# Patient Record
Sex: FEMALE | Race: WHITE | NOT HISPANIC OR LATINO | Employment: OTHER | URBAN - METROPOLITAN AREA
[De-identification: names, ages, dates, MRNs, and addresses within clinical notes are randomized per-mention and may not be internally consistent; named-entity substitution may affect disease eponyms.]

---

## 2021-08-02 ENCOUNTER — CLINICAL SUPPORT (OUTPATIENT)
Dept: CARDIOLOGY | Facility: HOSPITAL | Age: 76
DRG: 190 | End: 2021-08-02
Attending: INTERNAL MEDICINE
Payer: MEDICARE

## 2021-08-02 ENCOUNTER — HOSPITAL ENCOUNTER (INPATIENT)
Facility: HOSPITAL | Age: 76
LOS: 2 days | Discharge: HOME | DRG: 190 | End: 2021-08-04
Attending: EMERGENCY MEDICINE | Admitting: INTERNAL MEDICINE
Payer: MEDICARE

## 2021-08-02 VITALS — HEIGHT: 64 IN | BODY MASS INDEX: 16.93 KG/M2 | WEIGHT: 99.19 LBS

## 2021-08-02 DIAGNOSIS — J44.9 CHRONIC OBSTRUCTIVE PULMONARY DISEASE, UNSPECIFIED COPD TYPE: Primary | ICD-10-CM

## 2021-08-02 DIAGNOSIS — Z20.822 SUSPECTED COVID-19 VIRUS INFECTION: ICD-10-CM

## 2021-08-02 DIAGNOSIS — R06.02 SHORTNESS OF BREATH: ICD-10-CM

## 2021-08-02 DIAGNOSIS — I50.9 CHF (CONGESTIVE HEART FAILURE): ICD-10-CM

## 2021-08-02 PROBLEM — Z85.72 HX OF LYMPHOMA, NON-HODGKINS: Status: ACTIVE | Noted: 2021-08-02

## 2021-08-02 PROBLEM — R00.0 SINUS TACHYCARDIA: Status: ACTIVE | Noted: 2021-08-02

## 2021-08-02 LAB
ALBUMIN SERPL BCP-MCNC: 3.7 G/DL (ref 3.5–5.2)
ALLENS TEST: ABNORMAL
ALP SERPL-CCNC: 47 U/L (ref 55–135)
ALT SERPL W/O P-5'-P-CCNC: 33 U/L (ref 10–44)
ANION GAP SERPL CALC-SCNC: 13 MMOL/L (ref 8–16)
AORTIC ROOT ANNULUS: 2.6 CM
AORTIC VALVE CUSP SEPERATION: 1.43 CM
AST SERPL-CCNC: 32 U/L (ref 10–40)
AV INDEX (PROSTH): 0.52
AV MEAN GRADIENT: 2 MMHG
AV PEAK GRADIENT: 4 MMHG
AV VALVE AREA: 1.48 CM2
AV VELOCITY RATIO: 63.68
BASOPHILS # BLD AUTO: 0.05 K/UL (ref 0–0.2)
BASOPHILS NFR BLD: 0.7 % (ref 0–1.9)
BILIRUB SERPL-MCNC: 0.7 MG/DL (ref 0.1–1)
BNP SERPL-MCNC: 584 PG/ML (ref 0–99)
BSA FOR ECHO PROCEDURE: 1.43 M2
BUN SERPL-MCNC: 21 MG/DL (ref 8–23)
CALCIUM SERPL-MCNC: 9.4 MG/DL (ref 8.7–10.5)
CHLORIDE SERPL-SCNC: 102 MMOL/L (ref 95–110)
CK MB SERPL-MCNC: 2.3 NG/ML (ref 0.1–6.5)
CK SERPL-CCNC: 41 U/L (ref 20–180)
CO2 SERPL-SCNC: 26 MMOL/L (ref 23–29)
CREAT SERPL-MCNC: 1.5 MG/DL (ref 0.5–1.4)
CRP SERPL-MCNC: 0.78 MG/DL
CV ECHO LV RWT: 0.36 CM
DELSYS: ABNORMAL
DIFFERENTIAL METHOD: ABNORMAL
DOP CALC AO PEAK VEL: 0.95 M/S
DOP CALC AO VTI: 18.46 CM
DOP CALC LVOT AREA: 2.8 CM2
DOP CALC LVOT DIAMETER: 1.9 CM
DOP CALC LVOT PEAK VEL: 60.5 M/S
DOP CALC LVOT STROKE VOLUME: 27.4 CM3
DOP CALCLVOT PEAK VEL VTI: 9.67 CM
E WAVE DECELERATION TIME: 154.9 MSEC
E/A RATIO: 4
E/E' RATIO: 17.14 M/S
ECHO LV POSTERIOR WALL: 0.85 CM (ref 0.6–1.1)
EJECTION FRACTION: 55 %
EOSINOPHIL # BLD AUTO: 0.2 K/UL (ref 0–0.5)
EOSINOPHIL NFR BLD: 2.1 % (ref 0–8)
ERYTHROCYTE [DISTWIDTH] IN BLOOD BY AUTOMATED COUNT: 14.2 % (ref 11.5–14.5)
ERYTHROCYTE [SEDIMENTATION RATE] IN BLOOD BY WESTERGREN METHOD: 20 MM/HR (ref 0–20)
EST. GFR  (AFRICAN AMERICAN): 39 ML/MIN/1.73 M^2
EST. GFR  (NON AFRICAN AMERICAN): 33.8 ML/MIN/1.73 M^2
FERRITIN SERPL-MCNC: 26 NG/ML (ref 20–300)
FIO2: 28
FLOW: 2
FRACTIONAL SHORTENING: 14 % (ref 28–44)
GLUCOSE SERPL-MCNC: 148 MG/DL (ref 70–110)
GLUCOSE SERPL-MCNC: 152 MG/DL (ref 70–110)
HCO3 UR-SCNC: 25.5 MMOL/L (ref 24–28)
HCT VFR BLD AUTO: 40.6 % (ref 37–48.5)
HGB BLD-MCNC: 13 G/DL (ref 12–16)
IMM GRANULOCYTES # BLD AUTO: 0.02 K/UL (ref 0–0.04)
IMM GRANULOCYTES NFR BLD AUTO: 0.3 % (ref 0–0.5)
INTERVENTRICULAR SEPTUM: 0.85 CM (ref 0.6–1.1)
IVRT: 52.49 MSEC
LACTATE SERPL-SCNC: 1.4 MMOL/L (ref 0.5–1.9)
LDH SERPL L TO P-CCNC: 159 U/L (ref 110–260)
LEFT ATRIUM SIZE: 3.97 CM
LEFT INTERNAL DIMENSION IN SYSTOLE: 4 CM (ref 2.1–4)
LEFT VENTRICLE MASS INDEX: 90 G/M2
LEFT VENTRICULAR INTERNAL DIMENSION IN DIASTOLE: 4.67 CM (ref 3.5–6)
LEFT VENTRICULAR MASS: 130.91 G
LV LATERAL E/E' RATIO: 15 M/S
LV SEPTAL E/E' RATIO: 20 M/S
LYMPHOCYTES # BLD AUTO: 1.5 K/UL (ref 1–4.8)
LYMPHOCYTES NFR BLD: 20.4 % (ref 18–48)
MCH RBC QN AUTO: 29.1 PG (ref 27–31)
MCHC RBC AUTO-ENTMCNC: 32 G/DL (ref 32–36)
MCV RBC AUTO: 91 FL (ref 82–98)
MODE: ABNORMAL
MONOCYTES # BLD AUTO: 0.3 K/UL (ref 0.3–1)
MONOCYTES NFR BLD: 3.6 % (ref 4–15)
MV PEAK A VEL: 0.3 M/S
MV PEAK E VEL: 1.2 M/S
NEUTROPHILS # BLD AUTO: 5.2 K/UL (ref 1.8–7.7)
NEUTROPHILS NFR BLD: 72.9 % (ref 38–73)
NRBC BLD-RTO: 0 /100 WBC
PCO2 BLDA: 38.2 MMHG (ref 35–45)
PH SMN: 7.43 [PH] (ref 7.35–7.45)
PISA TR MAX VEL: 3.54 M/S
PLATELET # BLD AUTO: 281 K/UL (ref 150–450)
PMV BLD AUTO: 9.8 FL (ref 9.2–12.9)
PO2 BLDA: 102 MMHG (ref 80–100)
POC BE: 1 MMOL/L
POC SATURATED O2: 98 % (ref 95–100)
POC TCO2: 27 MMOL/L (ref 23–27)
POTASSIUM SERPL-SCNC: 3.8 MMOL/L (ref 3.5–5.1)
PROCALCITONIN SERPL IA-MCNC: <0.05 NG/ML (ref 0–0.5)
PROT SERPL-MCNC: 6.8 G/DL (ref 6–8.4)
PV PEAK VELOCITY: 67.63 CM/S
RA PRESSURE: 3 MMHG
RBC # BLD AUTO: 4.47 M/UL (ref 4–5.4)
RIGHT VENTRICULAR END-DIASTOLIC DIMENSION: 169 CM
SAMPLE: ABNORMAL
SARS-COV-2 RDRP RESP QL NAA+PROBE: NEGATIVE
SITE: ABNORMAL
SODIUM SERPL-SCNC: 141 MMOL/L (ref 136–145)
SP02: 98
TDI LATERAL: 0.08 M/S
TDI SEPTAL: 0.06 M/S
TDI: 0.07 M/S
TR MAX PG: 50 MMHG
TROPONIN I SERPL DL<=0.01 NG/ML-MCNC: 0.03 NG/ML
TV REST PULMONARY ARTERY PRESSURE: 53 MMHG
WBC # BLD AUTO: 7.16 K/UL (ref 3.9–12.7)

## 2021-08-02 PROCEDURE — 96374 THER/PROPH/DIAG INJ IV PUSH: CPT

## 2021-08-02 PROCEDURE — 82550 ASSAY OF CK (CPK): CPT | Performed by: EMERGENCY MEDICINE

## 2021-08-02 PROCEDURE — 99285 EMERGENCY DEPT VISIT HI MDM: CPT | Mod: 25

## 2021-08-02 PROCEDURE — 85651 RBC SED RATE NONAUTOMATED: CPT | Performed by: EMERGENCY MEDICINE

## 2021-08-02 PROCEDURE — 94644 CONT INHLJ TX 1ST HOUR: CPT

## 2021-08-02 PROCEDURE — 84484 ASSAY OF TROPONIN QUANT: CPT | Performed by: EMERGENCY MEDICINE

## 2021-08-02 PROCEDURE — 82553 CREATINE MB FRACTION: CPT | Performed by: EMERGENCY MEDICINE

## 2021-08-02 PROCEDURE — 63600175 PHARM REV CODE 636 W HCPCS: Performed by: INTERNAL MEDICINE

## 2021-08-02 PROCEDURE — 80053 COMPREHEN METABOLIC PANEL: CPT | Performed by: EMERGENCY MEDICINE

## 2021-08-02 PROCEDURE — 94761 N-INVAS EAR/PLS OXIMETRY MLT: CPT

## 2021-08-02 PROCEDURE — 83615 LACTATE (LD) (LDH) ENZYME: CPT | Performed by: EMERGENCY MEDICINE

## 2021-08-02 PROCEDURE — 93010 ELECTROCARDIOGRAM REPORT: CPT | Mod: ,,, | Performed by: INTERNAL MEDICINE

## 2021-08-02 PROCEDURE — 12000002 HC ACUTE/MED SURGE SEMI-PRIVATE ROOM

## 2021-08-02 PROCEDURE — 25000242 PHARM REV CODE 250 ALT 637 W/ HCPCS: Performed by: INTERNAL MEDICINE

## 2021-08-02 PROCEDURE — 93005 ELECTROCARDIOGRAM TRACING: CPT | Performed by: INTERNAL MEDICINE

## 2021-08-02 PROCEDURE — 93010 EKG 12-LEAD: ICD-10-PCS | Mod: ,,, | Performed by: INTERNAL MEDICINE

## 2021-08-02 PROCEDURE — 84145 PROCALCITONIN (PCT): CPT | Performed by: EMERGENCY MEDICINE

## 2021-08-02 PROCEDURE — 99900035 HC TECH TIME PER 15 MIN (STAT)

## 2021-08-02 PROCEDURE — 99900031 HC PATIENT EDUCATION (STAT)

## 2021-08-02 PROCEDURE — 25000003 PHARM REV CODE 250: Performed by: INTERNAL MEDICINE

## 2021-08-02 PROCEDURE — 27000221 HC OXYGEN, UP TO 24 HOURS

## 2021-08-02 PROCEDURE — 94640 AIRWAY INHALATION TREATMENT: CPT

## 2021-08-02 PROCEDURE — 36415 COLL VENOUS BLD VENIPUNCTURE: CPT | Performed by: EMERGENCY MEDICINE

## 2021-08-02 PROCEDURE — 86140 C-REACTIVE PROTEIN: CPT | Performed by: EMERGENCY MEDICINE

## 2021-08-02 PROCEDURE — 93306 ECHO (CUPID ONLY): ICD-10-PCS | Mod: 26,,, | Performed by: INTERNAL MEDICINE

## 2021-08-02 PROCEDURE — 93306 TTE W/DOPPLER COMPLETE: CPT

## 2021-08-02 PROCEDURE — 83605 ASSAY OF LACTIC ACID: CPT | Performed by: EMERGENCY MEDICINE

## 2021-08-02 PROCEDURE — 82728 ASSAY OF FERRITIN: CPT | Performed by: EMERGENCY MEDICINE

## 2021-08-02 PROCEDURE — 85025 COMPLETE CBC W/AUTO DIFF WBC: CPT | Performed by: EMERGENCY MEDICINE

## 2021-08-02 PROCEDURE — 63600175 PHARM REV CODE 636 W HCPCS: Performed by: EMERGENCY MEDICINE

## 2021-08-02 PROCEDURE — 25000242 PHARM REV CODE 250 ALT 637 W/ HCPCS: Performed by: EMERGENCY MEDICINE

## 2021-08-02 PROCEDURE — 93306 TTE W/DOPPLER COMPLETE: CPT | Mod: 26,,, | Performed by: INTERNAL MEDICINE

## 2021-08-02 PROCEDURE — 83880 ASSAY OF NATRIURETIC PEPTIDE: CPT | Performed by: EMERGENCY MEDICINE

## 2021-08-02 PROCEDURE — U0002 COVID-19 LAB TEST NON-CDC: HCPCS | Performed by: EMERGENCY MEDICINE

## 2021-08-02 RX ORDER — LORAZEPAM 1 MG/1
1 TABLET ORAL ONCE
Status: COMPLETED | OUTPATIENT
Start: 2021-08-02 | End: 2021-08-02

## 2021-08-02 RX ORDER — LEVOFLOXACIN 250 MG/1
250 TABLET ORAL DAILY
Status: DISCONTINUED | OUTPATIENT
Start: 2021-08-03 | End: 2021-08-04

## 2021-08-02 RX ORDER — PANTOPRAZOLE SODIUM 40 MG/1
40 TABLET, DELAYED RELEASE ORAL
Status: DISCONTINUED | OUTPATIENT
Start: 2021-08-03 | End: 2021-08-04 | Stop reason: HOSPADM

## 2021-08-02 RX ORDER — ENOXAPARIN SODIUM 100 MG/ML
40 INJECTION SUBCUTANEOUS
Status: DISCONTINUED | OUTPATIENT
Start: 2021-08-03 | End: 2021-08-02

## 2021-08-02 RX ORDER — POTASSIUM CHLORIDE 7.45 MG/ML
40 INJECTION INTRAVENOUS
Status: DISCONTINUED | OUTPATIENT
Start: 2021-08-02 | End: 2021-08-04 | Stop reason: HOSPADM

## 2021-08-02 RX ORDER — POTASSIUM CHLORIDE 20 MEQ/1
20 TABLET, EXTENDED RELEASE ORAL
Status: DISCONTINUED | OUTPATIENT
Start: 2021-08-02 | End: 2021-08-04 | Stop reason: HOSPADM

## 2021-08-02 RX ORDER — LEVALBUTEROL 1.25 MG/.5ML
1.25 SOLUTION, CONCENTRATE RESPIRATORY (INHALATION)
Status: DISCONTINUED | OUTPATIENT
Start: 2021-08-02 | End: 2021-08-02

## 2021-08-02 RX ORDER — MAGNESIUM SULFATE HEPTAHYDRATE 40 MG/ML
4 INJECTION, SOLUTION INTRAVENOUS
Status: DISCONTINUED | OUTPATIENT
Start: 2021-08-02 | End: 2021-08-04 | Stop reason: HOSPADM

## 2021-08-02 RX ORDER — LEVALBUTEROL 1.25 MG/.5ML
1.25 SOLUTION, CONCENTRATE RESPIRATORY (INHALATION)
Status: DISCONTINUED | OUTPATIENT
Start: 2021-08-03 | End: 2021-08-04 | Stop reason: HOSPADM

## 2021-08-02 RX ORDER — MAGNESIUM SULFATE HEPTAHYDRATE 40 MG/ML
2 INJECTION, SOLUTION INTRAVENOUS
Status: DISCONTINUED | OUTPATIENT
Start: 2021-08-02 | End: 2021-08-04 | Stop reason: HOSPADM

## 2021-08-02 RX ORDER — FUROSEMIDE 10 MG/ML
20 INJECTION INTRAMUSCULAR; INTRAVENOUS ONCE
Status: COMPLETED | OUTPATIENT
Start: 2021-08-02 | End: 2021-08-02

## 2021-08-02 RX ORDER — LEVALBUTEROL 1.25 MG/.5ML
3.75 SOLUTION, CONCENTRATE RESPIRATORY (INHALATION)
Status: COMPLETED | OUTPATIENT
Start: 2021-08-02 | End: 2021-08-02

## 2021-08-02 RX ORDER — LANOLIN ALCOHOL/MO/W.PET/CERES
800 CREAM (GRAM) TOPICAL
Status: DISCONTINUED | OUTPATIENT
Start: 2021-08-02 | End: 2021-08-04 | Stop reason: HOSPADM

## 2021-08-02 RX ORDER — FAMOTIDINE 20 MG/1
20 TABLET, FILM COATED ORAL DAILY
Status: DISCONTINUED | OUTPATIENT
Start: 2021-08-02 | End: 2021-08-02

## 2021-08-02 RX ORDER — ONDANSETRON 2 MG/ML
4 INJECTION INTRAMUSCULAR; INTRAVENOUS EVERY 6 HOURS PRN
Status: DISCONTINUED | OUTPATIENT
Start: 2021-08-02 | End: 2021-08-04 | Stop reason: HOSPADM

## 2021-08-02 RX ORDER — POTASSIUM CHLORIDE 7.45 MG/ML
20 INJECTION INTRAVENOUS
Status: DISCONTINUED | OUTPATIENT
Start: 2021-08-02 | End: 2021-08-04 | Stop reason: HOSPADM

## 2021-08-02 RX ORDER — IPRATROPIUM BROMIDE 0.5 MG/2.5ML
0.5 SOLUTION RESPIRATORY (INHALATION) EVERY 8 HOURS
Status: DISCONTINUED | OUTPATIENT
Start: 2021-08-02 | End: 2021-08-02

## 2021-08-02 RX ORDER — ENOXAPARIN SODIUM 100 MG/ML
50 INJECTION SUBCUTANEOUS ONCE
Status: COMPLETED | OUTPATIENT
Start: 2021-08-02 | End: 2021-08-02

## 2021-08-02 RX ORDER — POTASSIUM CHLORIDE 20 MEQ/1
40 TABLET, EXTENDED RELEASE ORAL
Status: DISCONTINUED | OUTPATIENT
Start: 2021-08-02 | End: 2021-08-04 | Stop reason: HOSPADM

## 2021-08-02 RX ORDER — ENOXAPARIN SODIUM 100 MG/ML
30 INJECTION SUBCUTANEOUS
Status: DISCONTINUED | OUTPATIENT
Start: 2021-08-03 | End: 2021-08-04 | Stop reason: HOSPADM

## 2021-08-02 RX ORDER — ACETAMINOPHEN 325 MG/1
650 TABLET ORAL EVERY 4 HOURS PRN
Status: DISCONTINUED | OUTPATIENT
Start: 2021-08-02 | End: 2021-08-04 | Stop reason: HOSPADM

## 2021-08-02 RX ORDER — ENOXAPARIN SODIUM 100 MG/ML
30 INJECTION SUBCUTANEOUS EVERY 24 HOURS
Status: DISCONTINUED | OUTPATIENT
Start: 2021-08-02 | End: 2021-08-02

## 2021-08-02 RX ORDER — IPRATROPIUM BROMIDE 0.5 MG/2.5ML
0.5 SOLUTION RESPIRATORY (INHALATION)
Status: DISCONTINUED | OUTPATIENT
Start: 2021-08-03 | End: 2021-08-04 | Stop reason: HOSPADM

## 2021-08-02 RX ORDER — PREDNISONE 20 MG/1
40 TABLET ORAL DAILY
Status: DISCONTINUED | OUTPATIENT
Start: 2021-08-02 | End: 2021-08-04 | Stop reason: HOSPADM

## 2021-08-02 RX ORDER — METOPROLOL TARTRATE 25 MG/1
25 TABLET, FILM COATED ORAL 2 TIMES DAILY
Status: DISCONTINUED | OUTPATIENT
Start: 2021-08-02 | End: 2021-08-02

## 2021-08-02 RX ORDER — LORAZEPAM 1 MG/1
1 TABLET ORAL EVERY 8 HOURS PRN
Status: DISCONTINUED | OUTPATIENT
Start: 2021-08-02 | End: 2021-08-04 | Stop reason: HOSPADM

## 2021-08-02 RX ORDER — METOPROLOL TARTRATE 25 MG/1
25 TABLET, FILM COATED ORAL 2 TIMES DAILY
Status: DISCONTINUED | OUTPATIENT
Start: 2021-08-02 | End: 2021-08-04 | Stop reason: HOSPADM

## 2021-08-02 RX ORDER — DEXAMETHASONE SODIUM PHOSPHATE 4 MG/ML
6 INJECTION, SOLUTION INTRA-ARTICULAR; INTRALESIONAL; INTRAMUSCULAR; INTRAVENOUS; SOFT TISSUE
Status: COMPLETED | OUTPATIENT
Start: 2021-08-02 | End: 2021-08-02

## 2021-08-02 RX ORDER — MAGNESIUM SULFATE 1 G/100ML
1 INJECTION INTRAVENOUS
Status: DISCONTINUED | OUTPATIENT
Start: 2021-08-02 | End: 2021-08-04 | Stop reason: HOSPADM

## 2021-08-02 RX ORDER — ALPRAZOLAM 0.5 MG/1
0.5 TABLET ORAL 3 TIMES DAILY PRN
Status: DISCONTINUED | OUTPATIENT
Start: 2021-08-02 | End: 2021-08-02

## 2021-08-02 RX ORDER — LEVOFLOXACIN 500 MG/1
500 TABLET, FILM COATED ORAL ONCE
Status: COMPLETED | OUTPATIENT
Start: 2021-08-02 | End: 2021-08-02

## 2021-08-02 RX ADMIN — ALPRAZOLAM 0.5 MG: 0.5 TABLET ORAL at 03:08

## 2021-08-02 RX ADMIN — FUROSEMIDE 20 MG: 10 INJECTION, SOLUTION INTRAMUSCULAR; INTRAVENOUS at 03:08

## 2021-08-02 RX ADMIN — LEVOFLOXACIN 500 MG: 500 TABLET, FILM COATED ORAL at 12:08

## 2021-08-02 RX ADMIN — FAMOTIDINE 20 MG: 20 TABLET ORAL at 03:08

## 2021-08-02 RX ADMIN — ENOXAPARIN SODIUM 50 MG: 60 INJECTION SUBCUTANEOUS at 03:08

## 2021-08-02 RX ADMIN — PREDNISONE 40 MG: 20 TABLET ORAL at 03:08

## 2021-08-02 RX ADMIN — DEXAMETHASONE SODIUM PHOSPHATE 6 MG: 4 INJECTION, SOLUTION INTRA-ARTICULAR; INTRALESIONAL; INTRAMUSCULAR; INTRAVENOUS; SOFT TISSUE at 09:08

## 2021-08-02 RX ADMIN — METOPROLOL TARTRATE 25 MG: 25 TABLET, FILM COATED ORAL at 12:08

## 2021-08-02 RX ADMIN — FUROSEMIDE 20 MG: 10 INJECTION, SOLUTION INTRAMUSCULAR; INTRAVENOUS at 06:08

## 2021-08-02 RX ADMIN — LEVALBUTEROL HYDROCHLORIDE 3.75 MG: 1.25 SOLUTION, CONCENTRATE RESPIRATORY (INHALATION) at 08:08

## 2021-08-02 RX ADMIN — LEVALBUTEROL HYDROCHLORIDE 1.25 MG: 1.25 SOLUTION, CONCENTRATE RESPIRATORY (INHALATION) at 05:08

## 2021-08-02 RX ADMIN — LORAZEPAM 1 MG: 1 TABLET ORAL at 06:08

## 2021-08-02 RX ADMIN — METOPROLOL TARTRATE 25 MG: 25 TABLET, FILM COATED ORAL at 09:08

## 2021-08-02 RX ADMIN — IPRATROPIUM BROMIDE 0.5 MG: 0.5 SOLUTION RESPIRATORY (INHALATION) at 05:08

## 2021-08-03 PROBLEM — J44.9 COPD (CHRONIC OBSTRUCTIVE PULMONARY DISEASE): Status: ACTIVE | Noted: 2021-08-03

## 2021-08-03 PROBLEM — I27.20 PULMONARY HYPERTENSION: Status: ACTIVE | Noted: 2021-08-03

## 2021-08-03 PROBLEM — J96.00 ACUTE RESPIRATORY FAILURE: Status: ACTIVE | Noted: 2021-08-03

## 2021-08-03 PROBLEM — J44.1 COPD WITH ACUTE EXACERBATION: Status: ACTIVE | Noted: 2021-08-03

## 2021-08-03 PROBLEM — N17.9 AKI (ACUTE KIDNEY INJURY): Status: ACTIVE | Noted: 2021-08-03

## 2021-08-03 PROBLEM — I51.89 DIASTOLIC DYSFUNCTION: Status: ACTIVE | Noted: 2021-08-03

## 2021-08-03 LAB
ALBUMIN SERPL BCP-MCNC: 3.8 G/DL (ref 3.5–5.2)
ALP SERPL-CCNC: 48 U/L (ref 55–135)
ALT SERPL W/O P-5'-P-CCNC: 44 U/L (ref 10–44)
ANION GAP SERPL CALC-SCNC: 13 MMOL/L (ref 8–16)
AST SERPL-CCNC: 37 U/L (ref 10–40)
BASOPHILS # BLD AUTO: 0 K/UL (ref 0–0.2)
BASOPHILS NFR BLD: 0 % (ref 0–1.9)
BILIRUB SERPL-MCNC: 1 MG/DL (ref 0.1–1)
BUN SERPL-MCNC: 35 MG/DL (ref 8–23)
CALCIUM SERPL-MCNC: 9.8 MG/DL (ref 8.7–10.5)
CHLORIDE SERPL-SCNC: 98 MMOL/L (ref 95–110)
CO2 SERPL-SCNC: 29 MMOL/L (ref 23–29)
CREAT SERPL-MCNC: 1.7 MG/DL (ref 0.5–1.4)
DIFFERENTIAL METHOD: ABNORMAL
EOSINOPHIL # BLD AUTO: 0 K/UL (ref 0–0.5)
EOSINOPHIL NFR BLD: 0 % (ref 0–8)
ERYTHROCYTE [DISTWIDTH] IN BLOOD BY AUTOMATED COUNT: 14.2 % (ref 11.5–14.5)
EST. GFR  (AFRICAN AMERICAN): 33.5 ML/MIN/1.73 M^2
EST. GFR  (NON AFRICAN AMERICAN): 29.1 ML/MIN/1.73 M^2
GLUCOSE SERPL-MCNC: 133 MG/DL (ref 70–110)
HCT VFR BLD AUTO: 41.8 % (ref 37–48.5)
HGB BLD-MCNC: 13.4 G/DL (ref 12–16)
IMM GRANULOCYTES # BLD AUTO: 0.05 K/UL (ref 0–0.04)
IMM GRANULOCYTES NFR BLD AUTO: 0.5 % (ref 0–0.5)
LYMPHOCYTES # BLD AUTO: 0.7 K/UL (ref 1–4.8)
LYMPHOCYTES NFR BLD: 7.2 % (ref 18–48)
MCH RBC QN AUTO: 29.7 PG (ref 27–31)
MCHC RBC AUTO-ENTMCNC: 32.1 G/DL (ref 32–36)
MCV RBC AUTO: 93 FL (ref 82–98)
MONOCYTES # BLD AUTO: 0.3 K/UL (ref 0.3–1)
MONOCYTES NFR BLD: 2.7 % (ref 4–15)
NEUTROPHILS # BLD AUTO: 9.2 K/UL (ref 1.8–7.7)
NEUTROPHILS NFR BLD: 89.6 % (ref 38–73)
NRBC BLD-RTO: 0 /100 WBC
PLATELET # BLD AUTO: 303 K/UL (ref 150–450)
PMV BLD AUTO: 9.7 FL (ref 9.2–12.9)
POTASSIUM SERPL-SCNC: 4.1 MMOL/L (ref 3.5–5.1)
PROT SERPL-MCNC: 7 G/DL (ref 6–8.4)
RBC # BLD AUTO: 4.51 M/UL (ref 4–5.4)
SODIUM SERPL-SCNC: 140 MMOL/L (ref 136–145)
WBC # BLD AUTO: 10.22 K/UL (ref 3.9–12.7)

## 2021-08-03 PROCEDURE — 25000003 PHARM REV CODE 250: Performed by: INTERNAL MEDICINE

## 2021-08-03 PROCEDURE — 85025 COMPLETE CBC W/AUTO DIFF WBC: CPT | Performed by: INTERNAL MEDICINE

## 2021-08-03 PROCEDURE — 94761 N-INVAS EAR/PLS OXIMETRY MLT: CPT

## 2021-08-03 PROCEDURE — 99900035 HC TECH TIME PER 15 MIN (STAT)

## 2021-08-03 PROCEDURE — 99232 SBSQ HOSP IP/OBS MODERATE 35: CPT | Mod: ,,, | Performed by: INTERNAL MEDICINE

## 2021-08-03 PROCEDURE — 80053 COMPREHEN METABOLIC PANEL: CPT | Performed by: INTERNAL MEDICINE

## 2021-08-03 PROCEDURE — 36415 COLL VENOUS BLD VENIPUNCTURE: CPT | Performed by: INTERNAL MEDICINE

## 2021-08-03 PROCEDURE — 99232 PR SUBSEQUENT HOSPITAL CARE,LEVL II: ICD-10-PCS | Mod: ,,, | Performed by: INTERNAL MEDICINE

## 2021-08-03 PROCEDURE — 12000002 HC ACUTE/MED SURGE SEMI-PRIVATE ROOM

## 2021-08-03 PROCEDURE — 94618 PULMONARY STRESS TESTING: CPT

## 2021-08-03 PROCEDURE — 63600175 PHARM REV CODE 636 W HCPCS

## 2021-08-03 PROCEDURE — 63600175 PHARM REV CODE 636 W HCPCS: Performed by: INTERNAL MEDICINE

## 2021-08-03 PROCEDURE — 25000242 PHARM REV CODE 250 ALT 637 W/ HCPCS: Performed by: INTERNAL MEDICINE

## 2021-08-03 PROCEDURE — 94640 AIRWAY INHALATION TREATMENT: CPT

## 2021-08-03 PROCEDURE — 27000221 HC OXYGEN, UP TO 24 HOURS

## 2021-08-03 PROCEDURE — 99900031 HC PATIENT EDUCATION (STAT)

## 2021-08-03 RX ORDER — LEVALBUTEROL 1.25 MG/.5ML
1.25 SOLUTION, CONCENTRATE RESPIRATORY (INHALATION) EVERY 6 HOURS PRN
Status: DISCONTINUED | OUTPATIENT
Start: 2021-08-03 | End: 2021-08-04 | Stop reason: HOSPADM

## 2021-08-03 RX ORDER — LORAZEPAM 1 MG/1
1 TABLET ORAL ONCE
Status: COMPLETED | OUTPATIENT
Start: 2021-08-03 | End: 2021-08-03

## 2021-08-03 RX ADMIN — IPRATROPIUM BROMIDE 0.5 MG: 0.5 SOLUTION RESPIRATORY (INHALATION) at 07:08

## 2021-08-03 RX ADMIN — LEVALBUTEROL HYDROCHLORIDE 1.25 MG: 1.25 SOLUTION, CONCENTRATE RESPIRATORY (INHALATION) at 09:08

## 2021-08-03 RX ADMIN — PANTOPRAZOLE SODIUM 40 MG: 40 TABLET, DELAYED RELEASE ORAL at 06:08

## 2021-08-03 RX ADMIN — PREDNISONE 40 MG: 20 TABLET ORAL at 09:08

## 2021-08-03 RX ADMIN — LEVALBUTEROL HYDROCHLORIDE 1.25 MG: 1.25 SOLUTION, CONCENTRATE RESPIRATORY (INHALATION) at 02:08

## 2021-08-03 RX ADMIN — LEVOFLOXACIN 250 MG: 250 TABLET, FILM COATED ORAL at 09:08

## 2021-08-03 RX ADMIN — ACETAMINOPHEN 650 MG: 325 TABLET, FILM COATED ORAL at 11:08

## 2021-08-03 RX ADMIN — IPRATROPIUM BROMIDE 0.5 MG: 0.5 SOLUTION RESPIRATORY (INHALATION) at 04:08

## 2021-08-03 RX ADMIN — ENOXAPARIN SODIUM 30 MG: 30 INJECTION SUBCUTANEOUS at 06:08

## 2021-08-03 RX ADMIN — LEVALBUTEROL HYDROCHLORIDE 1.25 MG: 1.25 SOLUTION, CONCENTRATE RESPIRATORY (INHALATION) at 04:08

## 2021-08-03 RX ADMIN — METOPROLOL TARTRATE 25 MG: 25 TABLET, FILM COATED ORAL at 08:08

## 2021-08-03 RX ADMIN — METOPROLOL TARTRATE 25 MG: 25 TABLET, FILM COATED ORAL at 09:08

## 2021-08-03 RX ADMIN — IPRATROPIUM BROMIDE 0.5 MG: 0.5 SOLUTION RESPIRATORY (INHALATION) at 09:08

## 2021-08-03 RX ADMIN — LORAZEPAM 1 MG: 1 TABLET ORAL at 02:08

## 2021-08-03 RX ADMIN — LEVALBUTEROL HYDROCHLORIDE 1.25 MG: 1.25 SOLUTION, CONCENTRATE RESPIRATORY (INHALATION) at 07:08

## 2021-08-04 VITALS
RESPIRATION RATE: 18 BRPM | DIASTOLIC BLOOD PRESSURE: 81 MMHG | TEMPERATURE: 98 F | WEIGHT: 99.19 LBS | BODY MASS INDEX: 16.93 KG/M2 | OXYGEN SATURATION: 95 % | HEART RATE: 80 BPM | HEIGHT: 64 IN | SYSTOLIC BLOOD PRESSURE: 105 MMHG

## 2021-08-04 PROBLEM — Z85.72 HX OF LYMPHOMA, NON-HODGKINS: Status: RESOLVED | Noted: 2021-08-02 | Resolved: 2021-08-04

## 2021-08-04 PROBLEM — R00.0 SINUS TACHYCARDIA: Status: RESOLVED | Noted: 2021-08-02 | Resolved: 2021-08-04

## 2021-08-04 PROBLEM — N17.9 AKI (ACUTE KIDNEY INJURY): Status: RESOLVED | Noted: 2021-08-03 | Resolved: 2021-08-04

## 2021-08-04 PROBLEM — J96.00 ACUTE RESPIRATORY FAILURE: Status: RESOLVED | Noted: 2021-08-03 | Resolved: 2021-08-04

## 2021-08-04 PROBLEM — J44.1 COPD WITH ACUTE EXACERBATION: Status: RESOLVED | Noted: 2021-08-03 | Resolved: 2021-08-04

## 2021-08-04 LAB
ALBUMIN SERPL BCP-MCNC: 3.4 G/DL (ref 3.5–5.2)
ALP SERPL-CCNC: 44 U/L (ref 55–135)
ALT SERPL W/O P-5'-P-CCNC: 60 U/L (ref 10–44)
ANION GAP SERPL CALC-SCNC: 10 MMOL/L (ref 8–16)
AST SERPL-CCNC: 56 U/L (ref 10–40)
BASOPHILS # BLD AUTO: 0.01 K/UL (ref 0–0.2)
BASOPHILS NFR BLD: 0.1 % (ref 0–1.9)
BILIRUB SERPL-MCNC: 0.7 MG/DL (ref 0.1–1)
BUN SERPL-MCNC: 45 MG/DL (ref 8–23)
CALCIUM SERPL-MCNC: 9.5 MG/DL (ref 8.7–10.5)
CHLORIDE SERPL-SCNC: 100 MMOL/L (ref 95–110)
CO2 SERPL-SCNC: 29 MMOL/L (ref 23–29)
CREAT SERPL-MCNC: 1.9 MG/DL (ref 0.5–1.4)
DIFFERENTIAL METHOD: ABNORMAL
EOSINOPHIL # BLD AUTO: 0 K/UL (ref 0–0.5)
EOSINOPHIL NFR BLD: 0 % (ref 0–8)
ERYTHROCYTE [DISTWIDTH] IN BLOOD BY AUTOMATED COUNT: 14.4 % (ref 11.5–14.5)
EST. GFR  (AFRICAN AMERICAN): 29.3 ML/MIN/1.73 M^2
EST. GFR  (NON AFRICAN AMERICAN): 25.4 ML/MIN/1.73 M^2
GLUCOSE SERPL-MCNC: 111 MG/DL (ref 70–110)
HCT VFR BLD AUTO: 36.6 % (ref 37–48.5)
HGB BLD-MCNC: 11.7 G/DL (ref 12–16)
IMM GRANULOCYTES # BLD AUTO: 0.05 K/UL (ref 0–0.04)
IMM GRANULOCYTES NFR BLD AUTO: 0.5 % (ref 0–0.5)
LYMPHOCYTES # BLD AUTO: 1.1 K/UL (ref 1–4.8)
LYMPHOCYTES NFR BLD: 10.1 % (ref 18–48)
MCH RBC QN AUTO: 30 PG (ref 27–31)
MCHC RBC AUTO-ENTMCNC: 32 G/DL (ref 32–36)
MCV RBC AUTO: 94 FL (ref 82–98)
MONOCYTES # BLD AUTO: 0.5 K/UL (ref 0.3–1)
MONOCYTES NFR BLD: 4.2 % (ref 4–15)
NEUTROPHILS # BLD AUTO: 9 K/UL (ref 1.8–7.7)
NEUTROPHILS NFR BLD: 85.1 % (ref 38–73)
NRBC BLD-RTO: 0 /100 WBC
PLATELET # BLD AUTO: 264 K/UL (ref 150–450)
PMV BLD AUTO: 10 FL (ref 9.2–12.9)
POTASSIUM SERPL-SCNC: 4.1 MMOL/L (ref 3.5–5.1)
PROT SERPL-MCNC: 6.1 G/DL (ref 6–8.4)
RBC # BLD AUTO: 3.9 M/UL (ref 4–5.4)
SODIUM SERPL-SCNC: 139 MMOL/L (ref 136–145)
WBC # BLD AUTO: 10.59 K/UL (ref 3.9–12.7)

## 2021-08-04 PROCEDURE — 99232 PR SUBSEQUENT HOSPITAL CARE,LEVL II: ICD-10-PCS | Mod: ,,, | Performed by: INTERNAL MEDICINE

## 2021-08-04 PROCEDURE — 94640 AIRWAY INHALATION TREATMENT: CPT

## 2021-08-04 PROCEDURE — 36415 COLL VENOUS BLD VENIPUNCTURE: CPT | Performed by: INTERNAL MEDICINE

## 2021-08-04 PROCEDURE — 63600175 PHARM REV CODE 636 W HCPCS: Performed by: INTERNAL MEDICINE

## 2021-08-04 PROCEDURE — 25000003 PHARM REV CODE 250: Performed by: INTERNAL MEDICINE

## 2021-08-04 PROCEDURE — 99900035 HC TECH TIME PER 15 MIN (STAT)

## 2021-08-04 PROCEDURE — 94761 N-INVAS EAR/PLS OXIMETRY MLT: CPT

## 2021-08-04 PROCEDURE — 85025 COMPLETE CBC W/AUTO DIFF WBC: CPT | Performed by: INTERNAL MEDICINE

## 2021-08-04 PROCEDURE — 99232 SBSQ HOSP IP/OBS MODERATE 35: CPT | Mod: ,,, | Performed by: INTERNAL MEDICINE

## 2021-08-04 PROCEDURE — 27000221 HC OXYGEN, UP TO 24 HOURS

## 2021-08-04 PROCEDURE — 25000242 PHARM REV CODE 250 ALT 637 W/ HCPCS: Performed by: INTERNAL MEDICINE

## 2021-08-04 PROCEDURE — 80053 COMPREHEN METABOLIC PANEL: CPT | Performed by: INTERNAL MEDICINE

## 2021-08-04 RX ORDER — SODIUM CHLORIDE 9 MG/ML
INJECTION, SOLUTION INTRAVENOUS CONTINUOUS
Status: DISCONTINUED | OUTPATIENT
Start: 2021-08-04 | End: 2021-08-04

## 2021-08-04 RX ORDER — FLUTICASONE FUROATE AND VILANTEROL TRIFENATATE 200; 25 UG/1; UG/1
1 POWDER RESPIRATORY (INHALATION) DAILY
Qty: 1 EACH | Refills: 0 | Status: SHIPPED | OUTPATIENT
Start: 2021-08-04 | End: 2021-08-09 | Stop reason: SDUPTHER

## 2021-08-04 RX ORDER — LORAZEPAM 1 MG/1
1 TABLET ORAL EVERY 12 HOURS PRN
Qty: 15 TABLET | Refills: 0 | Status: SHIPPED | OUTPATIENT
Start: 2021-08-04 | End: 2021-08-09 | Stop reason: SDUPTHER

## 2021-08-04 RX ORDER — PREDNISONE 20 MG/1
TABLET ORAL
Qty: 9 TABLET | Refills: 0 | Status: SHIPPED | OUTPATIENT
Start: 2021-08-05 | End: 2021-08-11

## 2021-08-04 RX ORDER — ALBUTEROL SULFATE 90 UG/1
2 AEROSOL, METERED RESPIRATORY (INHALATION) EVERY 6 HOURS PRN
Qty: 6.7 G | Refills: 0 | Status: SHIPPED | OUTPATIENT
Start: 2021-08-04 | End: 2021-09-07 | Stop reason: SDUPTHER

## 2021-08-04 RX ADMIN — PREDNISONE 40 MG: 20 TABLET ORAL at 08:08

## 2021-08-04 RX ADMIN — PANTOPRAZOLE SODIUM 40 MG: 40 TABLET, DELAYED RELEASE ORAL at 05:08

## 2021-08-04 RX ADMIN — LEVALBUTEROL HYDROCHLORIDE 1.25 MG: 1.25 SOLUTION, CONCENTRATE RESPIRATORY (INHALATION) at 11:08

## 2021-08-04 RX ADMIN — LORAZEPAM 1 MG: 1 TABLET ORAL at 10:08

## 2021-08-04 RX ADMIN — IPRATROPIUM BROMIDE 0.5 MG: 0.5 SOLUTION RESPIRATORY (INHALATION) at 11:08

## 2021-08-04 RX ADMIN — SODIUM CHLORIDE: 0.9 INJECTION, SOLUTION INTRAVENOUS at 10:08

## 2021-08-04 RX ADMIN — METOPROLOL TARTRATE 25 MG: 25 TABLET, FILM COATED ORAL at 08:08

## 2021-08-04 RX ADMIN — LEVALBUTEROL HYDROCHLORIDE 1.25 MG: 1.25 SOLUTION, CONCENTRATE RESPIRATORY (INHALATION) at 04:08

## 2021-08-04 RX ADMIN — LEVOFLOXACIN 250 MG: 250 TABLET, FILM COATED ORAL at 08:08

## 2021-08-09 ENCOUNTER — TELEPHONE (OUTPATIENT)
Dept: PULMONOLOGY | Facility: HOSPITAL | Age: 76
End: 2021-08-09

## 2021-08-09 RX ORDER — LORAZEPAM 1 MG/1
1 TABLET ORAL EVERY 12 HOURS PRN
Qty: 15 TABLET | Refills: 1 | Status: SHIPPED | OUTPATIENT
Start: 2021-08-09 | End: 2021-09-08

## 2021-08-09 RX ORDER — FLUTICASONE FUROATE AND VILANTEROL TRIFENATATE 200; 25 UG/1; UG/1
1 POWDER RESPIRATORY (INHALATION) DAILY
Qty: 1 EACH | Refills: 11 | Status: SHIPPED | OUTPATIENT
Start: 2021-08-09 | End: 2021-09-07 | Stop reason: SDUPTHER

## 2021-08-18 ENCOUNTER — OFFICE VISIT (OUTPATIENT)
Dept: FAMILY MEDICINE | Facility: CLINIC | Age: 76
End: 2021-08-18
Payer: MEDICARE

## 2021-08-18 VITALS
SYSTOLIC BLOOD PRESSURE: 110 MMHG | DIASTOLIC BLOOD PRESSURE: 70 MMHG | BODY MASS INDEX: 18.95 KG/M2 | HEIGHT: 64 IN | WEIGHT: 111 LBS | HEART RATE: 70 BPM

## 2021-08-18 DIAGNOSIS — J43.9 PULMONARY EMPHYSEMA, UNSPECIFIED EMPHYSEMA TYPE: ICD-10-CM

## 2021-08-18 DIAGNOSIS — E44.1 MILD PROTEIN-CALORIE MALNUTRITION: ICD-10-CM

## 2021-08-18 DIAGNOSIS — Z13.820 SCREENING FOR OSTEOPOROSIS: ICD-10-CM

## 2021-08-18 DIAGNOSIS — R11.0 NAUSEA: ICD-10-CM

## 2021-08-18 DIAGNOSIS — Z78.0 MENOPAUSE: Primary | ICD-10-CM

## 2021-08-18 DIAGNOSIS — F32.A ANXIETY AND DEPRESSION: ICD-10-CM

## 2021-08-18 DIAGNOSIS — Z87.891 FORMER CIGARETTE SMOKER: ICD-10-CM

## 2021-08-18 DIAGNOSIS — F41.9 ANXIETY AND DEPRESSION: ICD-10-CM

## 2021-08-18 DIAGNOSIS — I51.89 DIASTOLIC DYSFUNCTION: ICD-10-CM

## 2021-08-18 PROCEDURE — 99205 OFFICE O/P NEW HI 60 MIN: CPT | Mod: S$GLB,,, | Performed by: NURSE PRACTITIONER

## 2021-08-18 PROCEDURE — 99205 PR OFFICE/OUTPT VISIT, NEW, LEVL V, 60-74 MIN: ICD-10-PCS | Mod: S$GLB,,, | Performed by: NURSE PRACTITIONER

## 2021-08-18 RX ORDER — SERTRALINE HYDROCHLORIDE 25 MG/1
25 TABLET, FILM COATED ORAL DAILY
Qty: 30 TABLET | Refills: 4 | Status: ON HOLD | OUTPATIENT
Start: 2021-08-18 | End: 2021-10-01 | Stop reason: HOSPADM

## 2021-08-18 RX ORDER — PREDNISONE 5 MG/1
TABLET ORAL
Qty: 18 TABLET | Refills: 0 | Status: SHIPPED | OUTPATIENT
Start: 2021-08-18 | End: 2021-08-25

## 2021-08-18 RX ORDER — ONDANSETRON 4 MG/1
4 TABLET, ORALLY DISINTEGRATING ORAL EVERY 8 HOURS PRN
Qty: 30 TABLET | Refills: 1 | Status: ON HOLD | OUTPATIENT
Start: 2021-08-18 | End: 2021-09-11 | Stop reason: HOSPADM

## 2021-08-18 RX ORDER — ALPRAZOLAM 0.5 MG/1
0.5 TABLET ORAL 2 TIMES DAILY PRN
Qty: 60 TABLET | Refills: 0 | Status: SHIPPED | OUTPATIENT
Start: 2021-08-18 | End: 2021-10-22

## 2021-08-24 ENCOUNTER — HOSPITAL ENCOUNTER (OUTPATIENT)
Dept: RADIOLOGY | Facility: HOSPITAL | Age: 76
Discharge: HOME | End: 2021-08-24
Attending: NURSE PRACTITIONER
Payer: MEDICARE

## 2021-08-24 DIAGNOSIS — Z78.0 MENOPAUSE: ICD-10-CM

## 2021-08-24 DIAGNOSIS — Z13.820 SCREENING FOR OSTEOPOROSIS: ICD-10-CM

## 2021-08-24 PROCEDURE — 77080 DXA BONE DENSITY AXIAL: CPT | Mod: TC,PO

## 2021-08-25 ENCOUNTER — TELEPHONE (OUTPATIENT)
Dept: FAMILY MEDICINE | Facility: CLINIC | Age: 76
End: 2021-08-25

## 2021-08-25 ENCOUNTER — OFFICE VISIT (OUTPATIENT)
Dept: FAMILY MEDICINE | Facility: CLINIC | Age: 76
End: 2021-08-25
Payer: MEDICARE

## 2021-08-25 VITALS
DIASTOLIC BLOOD PRESSURE: 62 MMHG | WEIGHT: 104 LBS | OXYGEN SATURATION: 99 % | HEART RATE: 60 BPM | BODY MASS INDEX: 17.75 KG/M2 | SYSTOLIC BLOOD PRESSURE: 130 MMHG | HEIGHT: 64 IN

## 2021-08-25 DIAGNOSIS — R53.81 PHYSICAL DEBILITY: ICD-10-CM

## 2021-08-25 DIAGNOSIS — M81.0 AGE-RELATED OSTEOPOROSIS WITHOUT CURRENT PATHOLOGICAL FRACTURE: Primary | ICD-10-CM

## 2021-08-25 DIAGNOSIS — R60.0 LOCALIZED EDEMA: ICD-10-CM

## 2021-08-25 DIAGNOSIS — J43.9 PULMONARY EMPHYSEMA, UNSPECIFIED EMPHYSEMA TYPE: ICD-10-CM

## 2021-08-25 PROCEDURE — 99214 OFFICE O/P EST MOD 30 MIN: CPT | Mod: S$GLB,,, | Performed by: NURSE PRACTITIONER

## 2021-08-25 PROCEDURE — 99214 PR OFFICE/OUTPT VISIT, EST, LEVL IV, 30-39 MIN: ICD-10-PCS | Mod: S$GLB,,, | Performed by: NURSE PRACTITIONER

## 2021-08-25 RX ORDER — FUROSEMIDE 20 MG/1
20 TABLET ORAL DAILY
Qty: 30 TABLET | Refills: 4 | Status: ON HOLD | OUTPATIENT
Start: 2021-08-25 | End: 2021-09-11 | Stop reason: HOSPADM

## 2021-08-25 RX ORDER — ALENDRONATE SODIUM 70 MG/1
70 TABLET ORAL
Qty: 4 TABLET | Refills: 4 | Status: ON HOLD | OUTPATIENT
Start: 2021-08-25 | End: 2021-10-24 | Stop reason: SDUPTHER

## 2021-09-07 ENCOUNTER — TELEPHONE (OUTPATIENT)
Dept: PULMONOLOGY | Facility: HOSPITAL | Age: 76
End: 2021-09-07

## 2021-09-07 RX ORDER — ALBUTEROL SULFATE 90 UG/1
2 AEROSOL, METERED RESPIRATORY (INHALATION) EVERY 6 HOURS PRN
Qty: 6.7 G | Refills: 11 | Status: SHIPPED | OUTPATIENT
Start: 2021-09-07

## 2021-09-07 RX ORDER — FLUTICASONE FUROATE AND VILANTEROL TRIFENATATE 200; 25 UG/1; UG/1
1 POWDER RESPIRATORY (INHALATION) DAILY
Qty: 1 EACH | Refills: 11 | Status: SHIPPED | OUTPATIENT
Start: 2021-09-07 | End: 2021-09-27

## 2021-09-08 ENCOUNTER — HOSPITAL ENCOUNTER (INPATIENT)
Facility: HOSPITAL | Age: 76
LOS: 3 days | Discharge: HOME HEALTH | DRG: 308 | End: 2021-09-11
Attending: EMERGENCY MEDICINE | Admitting: INTERNAL MEDICINE
Payer: MEDICARE

## 2021-09-08 ENCOUNTER — OFFICE VISIT (OUTPATIENT)
Dept: PULMONOLOGY | Facility: CLINIC | Age: 76
End: 2021-09-08
Payer: MEDICARE

## 2021-09-08 ENCOUNTER — EXTERNAL HOME HEALTH (OUTPATIENT)
Dept: HOME HEALTH SERVICES | Facility: HOSPITAL | Age: 76
End: 2021-09-08

## 2021-09-08 VITALS
BODY MASS INDEX: 19.74 KG/M2 | SYSTOLIC BLOOD PRESSURE: 118 MMHG | OXYGEN SATURATION: 98 % | WEIGHT: 115 LBS | DIASTOLIC BLOOD PRESSURE: 78 MMHG | HEART RATE: 137 BPM

## 2021-09-08 DIAGNOSIS — R07.9 CHEST PAIN: ICD-10-CM

## 2021-09-08 DIAGNOSIS — I47.19 MULTIFOCAL ATRIAL TACHYCARDIA: ICD-10-CM

## 2021-09-08 DIAGNOSIS — R00.0 TACHYCARDIA: ICD-10-CM

## 2021-09-08 DIAGNOSIS — I48.91 ATRIAL FIBRILLATION: Primary | ICD-10-CM

## 2021-09-08 DIAGNOSIS — I51.89 DIASTOLIC DYSFUNCTION: ICD-10-CM

## 2021-09-08 DIAGNOSIS — I27.20 PULMONARY HYPERTENSION: ICD-10-CM

## 2021-09-08 DIAGNOSIS — R60.9 SWELLING: ICD-10-CM

## 2021-09-08 DIAGNOSIS — J44.9 CHRONIC OBSTRUCTIVE PULMONARY DISEASE, UNSPECIFIED COPD TYPE: ICD-10-CM

## 2021-09-08 DIAGNOSIS — R00.0 TACHYCARDIA, UNSPECIFIED: ICD-10-CM

## 2021-09-08 DIAGNOSIS — E87.6 HYPOKALEMIA: ICD-10-CM

## 2021-09-08 PROBLEM — I50.33 ACUTE ON CHRONIC DIASTOLIC HEART FAILURE: Status: ACTIVE | Noted: 2021-09-08

## 2021-09-08 LAB
ALBUMIN SERPL BCP-MCNC: 3.5 G/DL (ref 3.5–5.2)
ALP SERPL-CCNC: 52 U/L (ref 55–135)
ALT SERPL W/O P-5'-P-CCNC: 48 U/L (ref 10–44)
ANION GAP SERPL CALC-SCNC: 17 MMOL/L (ref 8–16)
APTT PPP: 24.2 SEC (ref 25.6–35.8)
AST SERPL-CCNC: 32 U/L (ref 10–40)
BASOPHILS # BLD AUTO: 0.01 K/UL (ref 0–0.2)
BASOPHILS NFR BLD: 0.1 % (ref 0–1.9)
BILIRUB SERPL-MCNC: 1.8 MG/DL (ref 0.1–1)
BILIRUB UR QL STRIP: NEGATIVE
BNP SERPL-MCNC: 1436 PG/ML (ref 0–99)
BUN SERPL-MCNC: 34 MG/DL (ref 8–23)
CALCIUM SERPL-MCNC: 9.5 MG/DL (ref 8.7–10.5)
CHLORIDE SERPL-SCNC: 97 MMOL/L (ref 95–110)
CK MB SERPL-MCNC: 5.4 NG/ML (ref 0.1–6.5)
CK SERPL-CCNC: 114 U/L (ref 20–180)
CLARITY UR: CLEAR
CO2 SERPL-SCNC: 30 MMOL/L (ref 23–29)
COLOR UR: YELLOW
CREAT SERPL-MCNC: 1.6 MG/DL (ref 0.5–1.4)
DIFFERENTIAL METHOD: ABNORMAL
EOSINOPHIL # BLD AUTO: 0 K/UL (ref 0–0.5)
EOSINOPHIL NFR BLD: 0.5 % (ref 0–8)
ERYTHROCYTE [DISTWIDTH] IN BLOOD BY AUTOMATED COUNT: 18 % (ref 11.5–14.5)
EST. GFR  (AFRICAN AMERICAN): 36.1 ML/MIN/1.73 M^2
EST. GFR  (NON AFRICAN AMERICAN): 31.3 ML/MIN/1.73 M^2
GLUCOSE SERPL-MCNC: 135 MG/DL (ref 70–110)
GLUCOSE UR QL STRIP: NEGATIVE
HCT VFR BLD AUTO: 37.7 % (ref 37–48.5)
HGB BLD-MCNC: 11.6 G/DL (ref 12–16)
HGB UR QL STRIP: NEGATIVE
IMM GRANULOCYTES # BLD AUTO: 0.04 K/UL (ref 0–0.04)
IMM GRANULOCYTES NFR BLD AUTO: 0.5 % (ref 0–0.5)
INR PPP: 1.1
KETONES UR QL STRIP: NEGATIVE
LEUKOCYTE ESTERASE UR QL STRIP: NEGATIVE
LYMPHOCYTES # BLD AUTO: 0.6 K/UL (ref 1–4.8)
LYMPHOCYTES NFR BLD: 7.9 % (ref 18–48)
MAGNESIUM SERPL-MCNC: 2.2 MG/DL (ref 1.6–2.6)
MCH RBC QN AUTO: 28.9 PG (ref 27–31)
MCHC RBC AUTO-ENTMCNC: 30.8 G/DL (ref 32–36)
MCV RBC AUTO: 94 FL (ref 82–98)
MONOCYTES # BLD AUTO: 0.3 K/UL (ref 0.3–1)
MONOCYTES NFR BLD: 4.1 % (ref 4–15)
NEUTROPHILS # BLD AUTO: 6.9 K/UL (ref 1.8–7.7)
NEUTROPHILS NFR BLD: 86.9 % (ref 38–73)
NITRITE UR QL STRIP: NEGATIVE
NRBC BLD-RTO: 0 /100 WBC
PH UR STRIP: 6 [PH] (ref 5–8)
PLATELET # BLD AUTO: 218 K/UL (ref 150–450)
PMV BLD AUTO: 10.2 FL (ref 9.2–12.9)
POTASSIUM SERPL-SCNC: 2.8 MMOL/L (ref 3.5–5.1)
PROT SERPL-MCNC: 6.3 G/DL (ref 6–8.4)
PROT UR QL STRIP: NEGATIVE
PROTHROMBIN TIME: 13.7 SEC (ref 11.8–14.3)
RBC # BLD AUTO: 4.01 M/UL (ref 4–5.4)
SARS-COV-2 RDRP RESP QL NAA+PROBE: NEGATIVE
SODIUM SERPL-SCNC: 144 MMOL/L (ref 136–145)
SP GR UR STRIP: 1.01 (ref 1–1.03)
TROPONIN I SERPL DL<=0.01 NG/ML-MCNC: 0.04 NG/ML
TSH SERPL DL<=0.005 MIU/L-ACNC: 3.3 UIU/ML (ref 0.34–5.6)
URN SPEC COLLECT METH UR: NORMAL
UROBILINOGEN UR STRIP-ACNC: 1 EU/DL
WBC # BLD AUTO: 7.96 K/UL (ref 3.9–12.7)

## 2021-09-08 PROCEDURE — 99291 CRITICAL CARE FIRST HOUR: CPT

## 2021-09-08 PROCEDURE — 85025 COMPLETE CBC W/AUTO DIFF WBC: CPT | Performed by: EMERGENCY MEDICINE

## 2021-09-08 PROCEDURE — 82550 ASSAY OF CK (CPK): CPT | Performed by: EMERGENCY MEDICINE

## 2021-09-08 PROCEDURE — 96375 TX/PRO/DX INJ NEW DRUG ADDON: CPT

## 2021-09-08 PROCEDURE — 93005 ELECTROCARDIOGRAM TRACING: CPT | Performed by: INTERNAL MEDICINE

## 2021-09-08 PROCEDURE — 25000003 PHARM REV CODE 250: Performed by: NURSE PRACTITIONER

## 2021-09-08 PROCEDURE — 63600175 PHARM REV CODE 636 W HCPCS: Performed by: EMERGENCY MEDICINE

## 2021-09-08 PROCEDURE — 36415 COLL VENOUS BLD VENIPUNCTURE: CPT | Performed by: NURSE PRACTITIONER

## 2021-09-08 PROCEDURE — U0002 COVID-19 LAB TEST NON-CDC: HCPCS | Performed by: EMERGENCY MEDICINE

## 2021-09-08 PROCEDURE — 81003 URINALYSIS AUTO W/O SCOPE: CPT | Performed by: EMERGENCY MEDICINE

## 2021-09-08 PROCEDURE — 93010 ELECTROCARDIOGRAM REPORT: CPT | Mod: ,,, | Performed by: INTERNAL MEDICINE

## 2021-09-08 PROCEDURE — 84484 ASSAY OF TROPONIN QUANT: CPT | Mod: 91 | Performed by: NURSE PRACTITIONER

## 2021-09-08 PROCEDURE — 25000003 PHARM REV CODE 250: Performed by: EMERGENCY MEDICINE

## 2021-09-08 PROCEDURE — 63600175 PHARM REV CODE 636 W HCPCS: Performed by: INTERNAL MEDICINE

## 2021-09-08 PROCEDURE — 21000000 HC CCU ICU ROOM CHARGE

## 2021-09-08 PROCEDURE — 85610 PROTHROMBIN TIME: CPT | Performed by: EMERGENCY MEDICINE

## 2021-09-08 PROCEDURE — 80053 COMPREHEN METABOLIC PANEL: CPT | Performed by: EMERGENCY MEDICINE

## 2021-09-08 PROCEDURE — 99214 PR OFFICE/OUTPT VISIT, EST, LEVL IV, 30-39 MIN: ICD-10-PCS | Mod: S$GLB,,, | Performed by: INTERNAL MEDICINE

## 2021-09-08 PROCEDURE — 83735 ASSAY OF MAGNESIUM: CPT | Performed by: EMERGENCY MEDICINE

## 2021-09-08 PROCEDURE — 99214 OFFICE O/P EST MOD 30 MIN: CPT | Mod: S$GLB,,, | Performed by: INTERNAL MEDICINE

## 2021-09-08 PROCEDURE — 83880 ASSAY OF NATRIURETIC PEPTIDE: CPT | Performed by: EMERGENCY MEDICINE

## 2021-09-08 PROCEDURE — 82553 CREATINE MB FRACTION: CPT | Performed by: EMERGENCY MEDICINE

## 2021-09-08 PROCEDURE — 84484 ASSAY OF TROPONIN QUANT: CPT | Performed by: EMERGENCY MEDICINE

## 2021-09-08 PROCEDURE — 85730 THROMBOPLASTIN TIME PARTIAL: CPT | Performed by: EMERGENCY MEDICINE

## 2021-09-08 PROCEDURE — 96374 THER/PROPH/DIAG INJ IV PUSH: CPT

## 2021-09-08 PROCEDURE — 63600175 PHARM REV CODE 636 W HCPCS: Performed by: NURSE PRACTITIONER

## 2021-09-08 PROCEDURE — 84443 ASSAY THYROID STIM HORMONE: CPT | Performed by: EMERGENCY MEDICINE

## 2021-09-08 PROCEDURE — 93010 EKG 12-LEAD: ICD-10-PCS | Mod: ,,, | Performed by: INTERNAL MEDICINE

## 2021-09-08 RX ORDER — POTASSIUM CHLORIDE 7.45 MG/ML
40 INJECTION INTRAVENOUS
Status: DISCONTINUED | OUTPATIENT
Start: 2021-09-08 | End: 2021-09-11 | Stop reason: HOSPADM

## 2021-09-08 RX ORDER — SODIUM CHLORIDE 0.9 % (FLUSH) 0.9 %
10 SYRINGE (ML) INJECTION
Status: DISCONTINUED | OUTPATIENT
Start: 2021-09-08 | End: 2021-09-11 | Stop reason: HOSPADM

## 2021-09-08 RX ORDER — FUROSEMIDE 10 MG/ML
20 INJECTION INTRAMUSCULAR; INTRAVENOUS
Status: DISPENSED | OUTPATIENT
Start: 2021-09-08 | End: 2021-09-10

## 2021-09-08 RX ORDER — LANOLIN ALCOHOL/MO/W.PET/CERES
800 CREAM (GRAM) TOPICAL
Status: DISCONTINUED | OUTPATIENT
Start: 2021-09-08 | End: 2021-09-11 | Stop reason: HOSPADM

## 2021-09-08 RX ORDER — POTASSIUM CHLORIDE 20 MEQ/1
40 TABLET, EXTENDED RELEASE ORAL
Status: DISCONTINUED | OUTPATIENT
Start: 2021-09-08 | End: 2021-09-11 | Stop reason: HOSPADM

## 2021-09-08 RX ORDER — MAGNESIUM SULFATE HEPTAHYDRATE 40 MG/ML
4 INJECTION, SOLUTION INTRAVENOUS
Status: DISCONTINUED | OUTPATIENT
Start: 2021-09-08 | End: 2021-09-11 | Stop reason: HOSPADM

## 2021-09-08 RX ORDER — ONDANSETRON 2 MG/ML
4 INJECTION INTRAMUSCULAR; INTRAVENOUS EVERY 8 HOURS PRN
Status: DISCONTINUED | OUTPATIENT
Start: 2021-09-08 | End: 2021-09-11 | Stop reason: HOSPADM

## 2021-09-08 RX ORDER — DILTIAZEM HYDROCHLORIDE 5 MG/ML
10 INJECTION INTRAVENOUS
Status: COMPLETED | OUTPATIENT
Start: 2021-09-08 | End: 2021-09-08

## 2021-09-08 RX ORDER — POLYETHYLENE GLYCOL 3350 17 G/17G
17 POWDER, FOR SOLUTION ORAL 2 TIMES DAILY PRN
Status: DISCONTINUED | OUTPATIENT
Start: 2021-09-08 | End: 2021-09-11 | Stop reason: HOSPADM

## 2021-09-08 RX ORDER — ACETAMINOPHEN 325 MG/1
650 TABLET ORAL EVERY 4 HOURS PRN
Status: DISCONTINUED | OUTPATIENT
Start: 2021-09-08 | End: 2021-09-11 | Stop reason: HOSPADM

## 2021-09-08 RX ORDER — FUROSEMIDE 10 MG/ML
40 INJECTION INTRAMUSCULAR; INTRAVENOUS
Status: DISCONTINUED | OUTPATIENT
Start: 2021-09-08 | End: 2021-09-08

## 2021-09-08 RX ORDER — POTASSIUM CHLORIDE 20 MEQ/1
20 TABLET, EXTENDED RELEASE ORAL
Status: DISCONTINUED | OUTPATIENT
Start: 2021-09-08 | End: 2021-09-11 | Stop reason: HOSPADM

## 2021-09-08 RX ORDER — LEVALBUTEROL 1.25 MG/.5ML
1.25 SOLUTION, CONCENTRATE RESPIRATORY (INHALATION) EVERY 6 HOURS PRN
Status: DISCONTINUED | OUTPATIENT
Start: 2021-09-08 | End: 2021-09-09

## 2021-09-08 RX ORDER — FLUTICASONE FUROATE AND VILANTEROL 200; 25 UG/1; UG/1
1 POWDER RESPIRATORY (INHALATION) DAILY
Status: DISCONTINUED | OUTPATIENT
Start: 2021-09-09 | End: 2021-09-11 | Stop reason: HOSPADM

## 2021-09-08 RX ORDER — MAGNESIUM SULFATE 1 G/100ML
1 INJECTION INTRAVENOUS
Status: DISCONTINUED | OUTPATIENT
Start: 2021-09-08 | End: 2021-09-11 | Stop reason: HOSPADM

## 2021-09-08 RX ORDER — CHOLECALCIFEROL (VITAMIN D3) 25 MCG
1000 TABLET ORAL DAILY
Status: DISCONTINUED | OUTPATIENT
Start: 2021-09-09 | End: 2021-09-11 | Stop reason: HOSPADM

## 2021-09-08 RX ORDER — SERTRALINE HYDROCHLORIDE 25 MG/1
25 TABLET, FILM COATED ORAL DAILY
Status: DISCONTINUED | OUTPATIENT
Start: 2021-09-09 | End: 2021-09-11 | Stop reason: HOSPADM

## 2021-09-08 RX ORDER — POTASSIUM CHLORIDE 7.45 MG/ML
20 INJECTION INTRAVENOUS
Status: DISCONTINUED | OUTPATIENT
Start: 2021-09-08 | End: 2021-09-11 | Stop reason: HOSPADM

## 2021-09-08 RX ORDER — TALC
6 POWDER (GRAM) TOPICAL NIGHTLY PRN
Status: DISCONTINUED | OUTPATIENT
Start: 2021-09-08 | End: 2021-09-11 | Stop reason: HOSPADM

## 2021-09-08 RX ORDER — ENOXAPARIN SODIUM 100 MG/ML
30 INJECTION SUBCUTANEOUS EVERY 24 HOURS
Status: DISCONTINUED | OUTPATIENT
Start: 2021-09-08 | End: 2021-09-10

## 2021-09-08 RX ORDER — MAGNESIUM SULFATE HEPTAHYDRATE 40 MG/ML
2 INJECTION, SOLUTION INTRAVENOUS
Status: DISCONTINUED | OUTPATIENT
Start: 2021-09-08 | End: 2021-09-11 | Stop reason: HOSPADM

## 2021-09-08 RX ORDER — AMOXICILLIN 250 MG
1 CAPSULE ORAL 2 TIMES DAILY
Status: DISCONTINUED | OUTPATIENT
Start: 2021-09-08 | End: 2021-09-11 | Stop reason: HOSPADM

## 2021-09-08 RX ORDER — POTASSIUM CHLORIDE 7.45 MG/ML
10 INJECTION INTRAVENOUS ONCE
Status: COMPLETED | OUTPATIENT
Start: 2021-09-08 | End: 2021-09-08

## 2021-09-08 RX ORDER — ALPRAZOLAM 0.5 MG/1
0.5 TABLET ORAL 2 TIMES DAILY PRN
Status: DISCONTINUED | OUTPATIENT
Start: 2021-09-08 | End: 2021-09-11 | Stop reason: HOSPADM

## 2021-09-08 RX ORDER — ENOXAPARIN SODIUM 100 MG/ML
40 INJECTION SUBCUTANEOUS EVERY 24 HOURS
Status: DISCONTINUED | OUTPATIENT
Start: 2021-09-08 | End: 2021-09-08

## 2021-09-08 RX ORDER — POTASSIUM CHLORIDE 20 MEQ/1
40 TABLET, EXTENDED RELEASE ORAL ONCE
Status: COMPLETED | OUTPATIENT
Start: 2021-09-08 | End: 2021-09-08

## 2021-09-08 RX ADMIN — DILTIAZEM HYDROCHLORIDE 10 MG: 5 INJECTION INTRAVENOUS at 02:09

## 2021-09-08 RX ADMIN — DILTIAZEM HYDROCHLORIDE 5 MG/HR: 100 INJECTION, POWDER, LYOPHILIZED, FOR SOLUTION INTRAVENOUS at 02:09

## 2021-09-08 RX ADMIN — POTASSIUM CHLORIDE 40 MEQ: 20 TABLET, EXTENDED RELEASE ORAL at 02:09

## 2021-09-08 RX ADMIN — FUROSEMIDE 20 MG: 20 INJECTION, SOLUTION INTRAMUSCULAR; INTRAVENOUS at 09:09

## 2021-09-08 RX ADMIN — ALPRAZOLAM 0.5 MG: 0.5 TABLET ORAL at 11:09

## 2021-09-08 RX ADMIN — ENOXAPARIN SODIUM 30 MG: 100 INJECTION SUBCUTANEOUS at 09:09

## 2021-09-08 RX ADMIN — MELATONIN 6 MG: at 11:09

## 2021-09-08 RX ADMIN — SENNOSIDES AND DOCUSATE SODIUM 1 TABLET: 8.6; 5 TABLET ORAL at 09:09

## 2021-09-08 RX ADMIN — POTASSIUM BICARBONATE 25 MEQ: 977.5 TABLET, EFFERVESCENT ORAL at 09:09

## 2021-09-08 RX ADMIN — POTASSIUM CHLORIDE 10 MEQ: 7.46 INJECTION, SOLUTION INTRAVENOUS at 02:09

## 2021-09-09 ENCOUNTER — CLINICAL SUPPORT (OUTPATIENT)
Dept: CARDIOLOGY | Facility: HOSPITAL | Age: 76
DRG: 308 | End: 2021-09-09
Attending: HOSPITALIST
Payer: MEDICARE

## 2021-09-09 LAB
ANION GAP SERPL CALC-SCNC: 14 MMOL/L (ref 8–16)
BASOPHILS # BLD AUTO: 0.02 K/UL (ref 0–0.2)
BASOPHILS NFR BLD: 0.3 % (ref 0–1.9)
BUN SERPL-MCNC: 27 MG/DL (ref 8–23)
CALCIUM SERPL-MCNC: 8.5 MG/DL (ref 8.7–10.5)
CHLORIDE SERPL-SCNC: 98 MMOL/L (ref 95–110)
CO2 SERPL-SCNC: 32 MMOL/L (ref 23–29)
CREAT SERPL-MCNC: 1.3 MG/DL (ref 0.5–1.4)
DIFFERENTIAL METHOD: ABNORMAL
EOSINOPHIL # BLD AUTO: 0 K/UL (ref 0–0.5)
EOSINOPHIL NFR BLD: 0.2 % (ref 0–8)
ERYTHROCYTE [DISTWIDTH] IN BLOOD BY AUTOMATED COUNT: 17.9 % (ref 11.5–14.5)
EST. GFR  (AFRICAN AMERICAN): 46.4 ML/MIN/1.73 M^2
EST. GFR  (NON AFRICAN AMERICAN): 40.2 ML/MIN/1.73 M^2
GLUCOSE SERPL-MCNC: 116 MG/DL (ref 70–110)
HCT VFR BLD AUTO: 37 % (ref 37–48.5)
HGB BLD-MCNC: 11.3 G/DL (ref 12–16)
IMM GRANULOCYTES # BLD AUTO: 0.05 K/UL (ref 0–0.04)
IMM GRANULOCYTES NFR BLD AUTO: 0.8 % (ref 0–0.5)
LYMPHOCYTES # BLD AUTO: 0.9 K/UL (ref 1–4.8)
LYMPHOCYTES NFR BLD: 14.5 % (ref 18–48)
MAGNESIUM SERPL-MCNC: 2 MG/DL (ref 1.6–2.6)
MCH RBC QN AUTO: 28.8 PG (ref 27–31)
MCHC RBC AUTO-ENTMCNC: 30.5 G/DL (ref 32–36)
MCV RBC AUTO: 94 FL (ref 82–98)
MONOCYTES # BLD AUTO: 0.3 K/UL (ref 0.3–1)
MONOCYTES NFR BLD: 4 % (ref 4–15)
NEUTROPHILS # BLD AUTO: 5.2 K/UL (ref 1.8–7.7)
NEUTROPHILS NFR BLD: 80.2 % (ref 38–73)
NRBC BLD-RTO: 0 /100 WBC
PLATELET # BLD AUTO: 200 K/UL (ref 150–450)
PMV BLD AUTO: 9.9 FL (ref 9.2–12.9)
POTASSIUM SERPL-SCNC: 3.6 MMOL/L (ref 3.5–5.1)
RBC # BLD AUTO: 3.92 M/UL (ref 4–5.4)
SODIUM SERPL-SCNC: 144 MMOL/L (ref 136–145)
TROPONIN I SERPL DL<=0.01 NG/ML-MCNC: 0.04 NG/ML
WBC # BLD AUTO: 6.48 K/UL (ref 3.9–12.7)

## 2021-09-09 PROCEDURE — 25000003 PHARM REV CODE 250: Performed by: HOSPITALIST

## 2021-09-09 PROCEDURE — 21000000 HC CCU ICU ROOM CHARGE

## 2021-09-09 PROCEDURE — 25000003 PHARM REV CODE 250: Performed by: NURSE PRACTITIONER

## 2021-09-09 PROCEDURE — 63600175 PHARM REV CODE 636 W HCPCS: Performed by: NURSE PRACTITIONER

## 2021-09-09 PROCEDURE — 80048 BASIC METABOLIC PNL TOTAL CA: CPT | Performed by: NURSE PRACTITIONER

## 2021-09-09 PROCEDURE — 84484 ASSAY OF TROPONIN QUANT: CPT | Performed by: HOSPITALIST

## 2021-09-09 PROCEDURE — 93306 ECHO (CUPID ONLY): ICD-10-PCS | Mod: 26,,, | Performed by: INTERNAL MEDICINE

## 2021-09-09 PROCEDURE — 99900031 HC PATIENT EDUCATION (STAT)

## 2021-09-09 PROCEDURE — 94640 AIRWAY INHALATION TREATMENT: CPT

## 2021-09-09 PROCEDURE — 99223 PR INITIAL HOSPITAL CARE,LEVL III: ICD-10-PCS | Mod: ,,, | Performed by: INTERNAL MEDICINE

## 2021-09-09 PROCEDURE — 27000221 HC OXYGEN, UP TO 24 HOURS

## 2021-09-09 PROCEDURE — 93306 TTE W/DOPPLER COMPLETE: CPT | Mod: 26,,, | Performed by: INTERNAL MEDICINE

## 2021-09-09 PROCEDURE — 85025 COMPLETE CBC W/AUTO DIFF WBC: CPT | Performed by: NURSE PRACTITIONER

## 2021-09-09 PROCEDURE — 36415 COLL VENOUS BLD VENIPUNCTURE: CPT | Performed by: HOSPITALIST

## 2021-09-09 PROCEDURE — 94799 UNLISTED PULMONARY SVC/PX: CPT

## 2021-09-09 PROCEDURE — 25000003 PHARM REV CODE 250: Performed by: FAMILY MEDICINE

## 2021-09-09 PROCEDURE — 99900035 HC TECH TIME PER 15 MIN (STAT)

## 2021-09-09 PROCEDURE — 36415 COLL VENOUS BLD VENIPUNCTURE: CPT | Performed by: NURSE PRACTITIONER

## 2021-09-09 PROCEDURE — 25000242 PHARM REV CODE 250 ALT 637 W/ HCPCS: Performed by: HOSPITALIST

## 2021-09-09 PROCEDURE — 63600175 PHARM REV CODE 636 W HCPCS: Performed by: INTERNAL MEDICINE

## 2021-09-09 PROCEDURE — 93306 TTE W/DOPPLER COMPLETE: CPT

## 2021-09-09 PROCEDURE — 25000242 PHARM REV CODE 250 ALT 637 W/ HCPCS: Performed by: NURSE PRACTITIONER

## 2021-09-09 PROCEDURE — 83735 ASSAY OF MAGNESIUM: CPT | Performed by: NURSE PRACTITIONER

## 2021-09-09 PROCEDURE — 25000003 PHARM REV CODE 250: Performed by: INTERNAL MEDICINE

## 2021-09-09 PROCEDURE — 94761 N-INVAS EAR/PLS OXIMETRY MLT: CPT

## 2021-09-09 PROCEDURE — 99223 1ST HOSP IP/OBS HIGH 75: CPT | Mod: ,,, | Performed by: INTERNAL MEDICINE

## 2021-09-09 RX ORDER — DIGOXIN 0.25 MG/ML
250 INJECTION INTRAMUSCULAR; INTRAVENOUS ONCE
Status: COMPLETED | OUTPATIENT
Start: 2021-09-09 | End: 2021-09-09

## 2021-09-09 RX ORDER — METOPROLOL TARTRATE 25 MG/1
25 TABLET, FILM COATED ORAL 2 TIMES DAILY
Status: DISCONTINUED | OUTPATIENT
Start: 2021-09-09 | End: 2021-09-11 | Stop reason: HOSPADM

## 2021-09-09 RX ORDER — POTASSIUM CHLORIDE 20 MEQ/1
40 TABLET, EXTENDED RELEASE ORAL ONCE
Status: COMPLETED | OUTPATIENT
Start: 2021-09-09 | End: 2021-09-09

## 2021-09-09 RX ORDER — DIGOXIN 0.25 MG/ML
250 INJECTION INTRAMUSCULAR; INTRAVENOUS ONCE
Status: DISCONTINUED | OUTPATIENT
Start: 2021-09-09 | End: 2021-09-09

## 2021-09-09 RX ORDER — METOPROLOL TARTRATE 1 MG/ML
5 INJECTION, SOLUTION INTRAVENOUS ONCE
Status: COMPLETED | OUTPATIENT
Start: 2021-09-09 | End: 2021-09-09

## 2021-09-09 RX ORDER — LEVALBUTEROL 1.25 MG/.5ML
1.25 SOLUTION, CONCENTRATE RESPIRATORY (INHALATION) EVERY 6 HOURS
Status: DISCONTINUED | OUTPATIENT
Start: 2021-09-09 | End: 2021-09-11 | Stop reason: HOSPADM

## 2021-09-09 RX ADMIN — DILTIAZEM HYDROCHLORIDE 5 MG/HR: 100 INJECTION, POWDER, LYOPHILIZED, FOR SOLUTION INTRAVENOUS at 09:09

## 2021-09-09 RX ADMIN — POTASSIUM CHLORIDE 40 MEQ: 20 TABLET, EXTENDED RELEASE ORAL at 10:09

## 2021-09-09 RX ADMIN — METOPROLOL TARTRATE 25 MG: 25 TABLET, FILM COATED ORAL at 02:09

## 2021-09-09 RX ADMIN — LEVALBUTEROL HYDROCHLORIDE 1.25 MG: 1.25 SOLUTION, CONCENTRATE RESPIRATORY (INHALATION) at 07:09

## 2021-09-09 RX ADMIN — DIGOXIN 250 MCG: 0.25 INJECTION INTRAMUSCULAR; INTRAVENOUS at 02:09

## 2021-09-09 RX ADMIN — ONDANSETRON 4 MG: 2 INJECTION INTRAMUSCULAR; INTRAVENOUS at 04:09

## 2021-09-09 RX ADMIN — ALPRAZOLAM 0.5 MG: 0.5 TABLET ORAL at 07:09

## 2021-09-09 RX ADMIN — ENOXAPARIN SODIUM 30 MG: 100 INJECTION SUBCUTANEOUS at 05:09

## 2021-09-09 RX ADMIN — ACETAMINOPHEN 650 MG: 325 TABLET, FILM COATED ORAL at 07:09

## 2021-09-09 RX ADMIN — SERTRALINE HYDROCHLORIDE 25 MG: 25 TABLET ORAL at 08:09

## 2021-09-09 RX ADMIN — Medication 1000 UNITS: at 08:09

## 2021-09-09 RX ADMIN — METOPROLOL TARTRATE 5 MG: 5 INJECTION, SOLUTION INTRAVENOUS at 12:09

## 2021-09-09 RX ADMIN — LEVALBUTEROL HYDROCHLORIDE 1.25 MG: 1.25 SOLUTION, CONCENTRATE RESPIRATORY (INHALATION) at 02:09

## 2021-09-09 RX ADMIN — FUROSEMIDE 20 MG: 20 INJECTION, SOLUTION INTRAMUSCULAR; INTRAVENOUS at 08:09

## 2021-09-09 RX ADMIN — SODIUM CHLORIDE 500 ML: 0.9 INJECTION, SOLUTION INTRAVENOUS at 11:09

## 2021-09-09 RX ADMIN — FLUTICASONE FUROATE AND VILANTEROL TRIFENATATE 1 PUFF: 200; 25 POWDER RESPIRATORY (INHALATION) at 08:09

## 2021-09-09 RX ADMIN — LEVALBUTEROL HYDROCHLORIDE 1.25 MG: 1.25 SOLUTION, CONCENTRATE RESPIRATORY (INHALATION) at 09:09

## 2021-09-09 RX ADMIN — SENNOSIDES AND DOCUSATE SODIUM 1 TABLET: 8.6; 5 TABLET ORAL at 08:09

## 2021-09-10 LAB
ANION GAP SERPL CALC-SCNC: 14 MMOL/L (ref 8–16)
AORTIC ROOT ANNULUS: 2.47 CM
AORTIC VALVE CUSP SEPERATION: 1.5 CM
AV INDEX (PROSTH): 0.64
AV MEAN GRADIENT: 3 MMHG
AV PEAK GRADIENT: 5 MMHG
AV VALVE AREA: 2.09 CM2
AV VELOCITY RATIO: 68.18
BASOPHILS # BLD AUTO: 0.02 K/UL (ref 0–0.2)
BASOPHILS NFR BLD: 0.3 % (ref 0–1.9)
BSA FOR ECHO PROCEDURE: 1.55 M2
BUN SERPL-MCNC: 41 MG/DL (ref 8–23)
CALCIUM SERPL-MCNC: 8.7 MG/DL (ref 8.7–10.5)
CHLORIDE SERPL-SCNC: 96 MMOL/L (ref 95–110)
CO2 SERPL-SCNC: 28 MMOL/L (ref 23–29)
CREAT SERPL-MCNC: 2 MG/DL (ref 0.5–1.4)
CV ECHO LV RWT: 0.45 CM
DIFFERENTIAL METHOD: ABNORMAL
DOP CALC AO PEAK VEL: 1.14 M/S
DOP CALC AO VTI: 18.33 CM
DOP CALC LVOT AREA: 3.3 CM2
DOP CALC LVOT DIAMETER: 2.04 CM
DOP CALC LVOT PEAK VEL: 77.73 M/S
DOP CALC LVOT STROKE VOLUME: 38.35 CM3
DOP CALCLVOT PEAK VEL VTI: 11.74 CM
E WAVE DECELERATION TIME: 227.91 MSEC
E/E' RATIO: 9.91 M/S
ECHO LV POSTERIOR WALL: 0.9 CM (ref 0.6–1.1)
EJECTION FRACTION: 55 %
EOSINOPHIL # BLD AUTO: 0 K/UL (ref 0–0.5)
EOSINOPHIL NFR BLD: 0.1 % (ref 0–8)
ERYTHROCYTE [DISTWIDTH] IN BLOOD BY AUTOMATED COUNT: 17.9 % (ref 11.5–14.5)
EST. GFR  (AFRICAN AMERICAN): 27.5 ML/MIN/1.73 M^2
EST. GFR  (NON AFRICAN AMERICAN): 23.9 ML/MIN/1.73 M^2
FRACTIONAL SHORTENING: 30 % (ref 28–44)
GLUCOSE SERPL-MCNC: 177 MG/DL (ref 70–110)
HCT VFR BLD AUTO: 41.1 % (ref 37–48.5)
HGB BLD-MCNC: 12.3 G/DL (ref 12–16)
IMM GRANULOCYTES # BLD AUTO: 0.12 K/UL (ref 0–0.04)
IMM GRANULOCYTES NFR BLD AUTO: 1.6 % (ref 0–0.5)
INTERVENTRICULAR SEPTUM: 0.9 CM (ref 0.6–1.1)
LEFT ATRIUM SIZE: 2.83 CM
LEFT INTERNAL DIMENSION IN SYSTOLE: 2.83 CM (ref 2.1–4)
LEFT VENTRICLE MASS INDEX: 71 G/M2
LEFT VENTRICULAR INTERNAL DIMENSION IN DIASTOLE: 4.03 CM (ref 3.5–6)
LEFT VENTRICULAR MASS: 111.01 G
LV LATERAL E/E' RATIO: 9.91 M/S
LV SEPTAL E/E' RATIO: 9.91 M/S
LYMPHOCYTES # BLD AUTO: 1.2 K/UL (ref 1–4.8)
LYMPHOCYTES NFR BLD: 15.3 % (ref 18–48)
MAGNESIUM SERPL-MCNC: 2.1 MG/DL (ref 1.6–2.6)
MCH RBC QN AUTO: 29.9 PG (ref 27–31)
MCHC RBC AUTO-ENTMCNC: 29.9 G/DL (ref 32–36)
MCV RBC AUTO: 97 FL (ref 82–98)
MONOCYTES # BLD AUTO: 0.2 K/UL (ref 0.3–1)
MONOCYTES NFR BLD: 2.7 % (ref 4–15)
MV PEAK E VEL: 1.09 M/S
NEUTROPHILS # BLD AUTO: 6.2 K/UL (ref 1.8–7.7)
NEUTROPHILS NFR BLD: 80 % (ref 38–73)
NRBC BLD-RTO: 0 /100 WBC
PISA TR MAX VEL: 3.1 M/S
PLATELET # BLD AUTO: 227 K/UL (ref 150–450)
PMV BLD AUTO: 10 FL (ref 9.2–12.9)
POTASSIUM SERPL-SCNC: 4.6 MMOL/L (ref 3.5–5.1)
PV PEAK VELOCITY: 78.22 CM/S
RA PRESSURE: 8 MMHG
RBC # BLD AUTO: 4.12 M/UL (ref 4–5.4)
SODIUM SERPL-SCNC: 138 MMOL/L (ref 136–145)
TDI LATERAL: 0.11 M/S
TDI SEPTAL: 0.11 M/S
TDI: 0.11 M/S
TR MAX PG: 38 MMHG
TV REST PULMONARY ARTERY PRESSURE: 46 MMHG
WBC # BLD AUTO: 7.72 K/UL (ref 3.9–12.7)

## 2021-09-10 PROCEDURE — 25000003 PHARM REV CODE 250: Performed by: INTERNAL MEDICINE

## 2021-09-10 PROCEDURE — 80048 BASIC METABOLIC PNL TOTAL CA: CPT | Performed by: NURSE PRACTITIONER

## 2021-09-10 PROCEDURE — 99900035 HC TECH TIME PER 15 MIN (STAT)

## 2021-09-10 PROCEDURE — 25000003 PHARM REV CODE 250: Performed by: NURSE PRACTITIONER

## 2021-09-10 PROCEDURE — 83735 ASSAY OF MAGNESIUM: CPT | Performed by: NURSE PRACTITIONER

## 2021-09-10 PROCEDURE — 27000221 HC OXYGEN, UP TO 24 HOURS

## 2021-09-10 PROCEDURE — 85025 COMPLETE CBC W/AUTO DIFF WBC: CPT | Performed by: NURSE PRACTITIONER

## 2021-09-10 PROCEDURE — 99232 PR SUBSEQUENT HOSPITAL CARE,LEVL II: ICD-10-PCS | Mod: ,,, | Performed by: INTERNAL MEDICINE

## 2021-09-10 PROCEDURE — 25000242 PHARM REV CODE 250 ALT 637 W/ HCPCS: Performed by: HOSPITALIST

## 2021-09-10 PROCEDURE — 36415 COLL VENOUS BLD VENIPUNCTURE: CPT | Performed by: NURSE PRACTITIONER

## 2021-09-10 PROCEDURE — 99900031 HC PATIENT EDUCATION (STAT)

## 2021-09-10 PROCEDURE — 99232 SBSQ HOSP IP/OBS MODERATE 35: CPT | Mod: ,,, | Performed by: INTERNAL MEDICINE

## 2021-09-10 PROCEDURE — 94761 N-INVAS EAR/PLS OXIMETRY MLT: CPT

## 2021-09-10 PROCEDURE — 21000000 HC CCU ICU ROOM CHARGE

## 2021-09-10 PROCEDURE — 25000003 PHARM REV CODE 250: Performed by: HOSPITALIST

## 2021-09-10 PROCEDURE — 94640 AIRWAY INHALATION TREATMENT: CPT

## 2021-09-10 RX ORDER — SODIUM CHLORIDE 9 MG/ML
INJECTION, SOLUTION INTRAVENOUS CONTINUOUS
Status: DISCONTINUED | OUTPATIENT
Start: 2021-09-10 | End: 2021-09-11 | Stop reason: HOSPADM

## 2021-09-10 RX ADMIN — SENNOSIDES AND DOCUSATE SODIUM 1 TABLET: 8.6; 5 TABLET ORAL at 08:09

## 2021-09-10 RX ADMIN — APIXABAN 2.5 MG: 2.5 TABLET, FILM COATED ORAL at 09:09

## 2021-09-10 RX ADMIN — LEVALBUTEROL HYDROCHLORIDE 1.25 MG: 1.25 SOLUTION, CONCENTRATE RESPIRATORY (INHALATION) at 07:09

## 2021-09-10 RX ADMIN — FLUTICASONE FUROATE AND VILANTEROL TRIFENATATE 1 PUFF: 200; 25 POWDER RESPIRATORY (INHALATION) at 07:09

## 2021-09-10 RX ADMIN — ALPRAZOLAM 0.5 MG: 0.5 TABLET ORAL at 08:09

## 2021-09-10 RX ADMIN — SERTRALINE HYDROCHLORIDE 25 MG: 25 TABLET ORAL at 08:09

## 2021-09-10 RX ADMIN — LEVALBUTEROL HYDROCHLORIDE 1.25 MG: 1.25 SOLUTION, CONCENTRATE RESPIRATORY (INHALATION) at 04:09

## 2021-09-10 RX ADMIN — METOPROLOL TARTRATE 25 MG: 25 TABLET, FILM COATED ORAL at 08:09

## 2021-09-10 RX ADMIN — Medication 1000 UNITS: at 08:09

## 2021-09-10 RX ADMIN — MELATONIN 6 MG: at 08:09

## 2021-09-10 RX ADMIN — SODIUM CHLORIDE: 0.9 INJECTION, SOLUTION INTRAVENOUS at 08:09

## 2021-09-10 RX ADMIN — APIXABAN 2.5 MG: 2.5 TABLET, FILM COATED ORAL at 08:09

## 2021-09-10 RX ADMIN — SODIUM CHLORIDE: 0.9 INJECTION, SOLUTION INTRAVENOUS at 09:09

## 2021-09-10 RX ADMIN — LEVALBUTEROL HYDROCHLORIDE 1.25 MG: 1.25 SOLUTION, CONCENTRATE RESPIRATORY (INHALATION) at 08:09

## 2021-09-11 VITALS
DIASTOLIC BLOOD PRESSURE: 59 MMHG | HEART RATE: 64 BPM | TEMPERATURE: 98 F | OXYGEN SATURATION: 94 % | RESPIRATION RATE: 15 BRPM | SYSTOLIC BLOOD PRESSURE: 94 MMHG | HEIGHT: 64 IN | WEIGHT: 116.88 LBS | BODY MASS INDEX: 19.96 KG/M2

## 2021-09-11 LAB
ANION GAP SERPL CALC-SCNC: 12 MMOL/L (ref 8–16)
BASOPHILS # BLD AUTO: 0.01 K/UL (ref 0–0.2)
BASOPHILS NFR BLD: 0.1 % (ref 0–1.9)
BUN SERPL-MCNC: 47 MG/DL (ref 8–23)
CALCIUM SERPL-MCNC: 8.5 MG/DL (ref 8.7–10.5)
CHLORIDE SERPL-SCNC: 92 MMOL/L (ref 95–110)
CO2 SERPL-SCNC: 30 MMOL/L (ref 23–29)
CREAT SERPL-MCNC: 1.9 MG/DL (ref 0.5–1.4)
DIFFERENTIAL METHOD: ABNORMAL
EOSINOPHIL # BLD AUTO: 0.1 K/UL (ref 0–0.5)
EOSINOPHIL NFR BLD: 0.9 % (ref 0–8)
ERYTHROCYTE [DISTWIDTH] IN BLOOD BY AUTOMATED COUNT: 17.3 % (ref 11.5–14.5)
EST. GFR  (AFRICAN AMERICAN): 29.3 ML/MIN/1.73 M^2
EST. GFR  (NON AFRICAN AMERICAN): 25.4 ML/MIN/1.73 M^2
GLUCOSE SERPL-MCNC: 131 MG/DL (ref 70–110)
HCT VFR BLD AUTO: 38.7 % (ref 37–48.5)
HGB BLD-MCNC: 11.7 G/DL (ref 12–16)
IMM GRANULOCYTES # BLD AUTO: 0.06 K/UL (ref 0–0.04)
IMM GRANULOCYTES NFR BLD AUTO: 0.9 % (ref 0–0.5)
LYMPHOCYTES # BLD AUTO: 0.5 K/UL (ref 1–4.8)
LYMPHOCYTES NFR BLD: 7.5 % (ref 18–48)
MCH RBC QN AUTO: 29.5 PG (ref 27–31)
MCHC RBC AUTO-ENTMCNC: 30.2 G/DL (ref 32–36)
MCV RBC AUTO: 98 FL (ref 82–98)
MONOCYTES # BLD AUTO: 0.3 K/UL (ref 0.3–1)
MONOCYTES NFR BLD: 3.7 % (ref 4–15)
NEUTROPHILS # BLD AUTO: 6 K/UL (ref 1.8–7.7)
NEUTROPHILS NFR BLD: 86.9 % (ref 38–73)
NRBC BLD-RTO: 0 /100 WBC
PLATELET # BLD AUTO: 238 K/UL (ref 150–450)
PMV BLD AUTO: 10.1 FL (ref 9.2–12.9)
POTASSIUM SERPL-SCNC: 4 MMOL/L (ref 3.5–5.1)
RBC # BLD AUTO: 3.97 M/UL (ref 4–5.4)
SODIUM SERPL-SCNC: 134 MMOL/L (ref 136–145)
WBC # BLD AUTO: 6.84 K/UL (ref 3.9–12.7)

## 2021-09-11 PROCEDURE — 80048 BASIC METABOLIC PNL TOTAL CA: CPT | Performed by: NURSE PRACTITIONER

## 2021-09-11 PROCEDURE — 27000221 HC OXYGEN, UP TO 24 HOURS

## 2021-09-11 PROCEDURE — 25000003 PHARM REV CODE 250: Performed by: INTERNAL MEDICINE

## 2021-09-11 PROCEDURE — 25000003 PHARM REV CODE 250: Performed by: NURSE PRACTITIONER

## 2021-09-11 PROCEDURE — 99900035 HC TECH TIME PER 15 MIN (STAT)

## 2021-09-11 PROCEDURE — 36415 COLL VENOUS BLD VENIPUNCTURE: CPT | Performed by: NURSE PRACTITIONER

## 2021-09-11 PROCEDURE — 94640 AIRWAY INHALATION TREATMENT: CPT

## 2021-09-11 PROCEDURE — 99900031 HC PATIENT EDUCATION (STAT)

## 2021-09-11 PROCEDURE — 25000242 PHARM REV CODE 250 ALT 637 W/ HCPCS: Performed by: HOSPITALIST

## 2021-09-11 PROCEDURE — 25000003 PHARM REV CODE 250: Performed by: HOSPITALIST

## 2021-09-11 PROCEDURE — 94761 N-INVAS EAR/PLS OXIMETRY MLT: CPT

## 2021-09-11 PROCEDURE — 85025 COMPLETE CBC W/AUTO DIFF WBC: CPT | Performed by: NURSE PRACTITIONER

## 2021-09-11 RX ORDER — METOPROLOL TARTRATE 25 MG/1
25 TABLET, FILM COATED ORAL 2 TIMES DAILY
Qty: 60 TABLET | Refills: 0
Start: 2021-09-11 | End: 2021-09-21 | Stop reason: SDUPTHER

## 2021-09-11 RX ADMIN — LEVALBUTEROL HYDROCHLORIDE 1.25 MG: 1.25 SOLUTION, CONCENTRATE RESPIRATORY (INHALATION) at 02:09

## 2021-09-11 RX ADMIN — APIXABAN 2.5 MG: 2.5 TABLET, FILM COATED ORAL at 08:09

## 2021-09-11 RX ADMIN — ACETAMINOPHEN 650 MG: 325 TABLET, FILM COATED ORAL at 02:09

## 2021-09-11 RX ADMIN — LEVALBUTEROL HYDROCHLORIDE 1.25 MG: 1.25 SOLUTION, CONCENTRATE RESPIRATORY (INHALATION) at 01:09

## 2021-09-11 RX ADMIN — ACETAMINOPHEN 650 MG: 325 TABLET, FILM COATED ORAL at 09:09

## 2021-09-11 RX ADMIN — FLUTICASONE FUROATE AND VILANTEROL TRIFENATATE 1 PUFF: 200; 25 POWDER RESPIRATORY (INHALATION) at 07:09

## 2021-09-11 RX ADMIN — Medication 1000 UNITS: at 08:09

## 2021-09-11 RX ADMIN — LEVALBUTEROL HYDROCHLORIDE 1.25 MG: 1.25 SOLUTION, CONCENTRATE RESPIRATORY (INHALATION) at 07:09

## 2021-09-11 RX ADMIN — METOPROLOL TARTRATE 25 MG: 25 TABLET, FILM COATED ORAL at 08:09

## 2021-09-11 RX ADMIN — SERTRALINE HYDROCHLORIDE 25 MG: 25 TABLET ORAL at 08:09

## 2021-09-13 ENCOUNTER — TELEPHONE (OUTPATIENT)
Dept: CARDIOLOGY | Facility: CLINIC | Age: 76
End: 2021-09-13

## 2021-09-21 ENCOUNTER — OFFICE VISIT (OUTPATIENT)
Dept: CARDIOLOGY | Facility: CLINIC | Age: 76
End: 2021-09-21
Payer: MEDICARE

## 2021-09-21 VITALS
HEART RATE: 84 BPM | SYSTOLIC BLOOD PRESSURE: 122 MMHG | HEIGHT: 64 IN | BODY MASS INDEX: 19.46 KG/M2 | OXYGEN SATURATION: 99 % | DIASTOLIC BLOOD PRESSURE: 70 MMHG | WEIGHT: 114 LBS

## 2021-09-21 DIAGNOSIS — J44.9 CHRONIC OBSTRUCTIVE PULMONARY DISEASE, UNSPECIFIED COPD TYPE: Primary | ICD-10-CM

## 2021-09-21 DIAGNOSIS — I27.20 PULMONARY HYPERTENSION: ICD-10-CM

## 2021-09-21 DIAGNOSIS — I47.19 MULTIFOCAL ATRIAL TACHYCARDIA: ICD-10-CM

## 2021-09-21 DIAGNOSIS — C85.90 LYMPHOMA, UNSPECIFIED BODY REGION, UNSPECIFIED LYMPHOMA TYPE: ICD-10-CM

## 2021-09-21 DIAGNOSIS — I87.2 VENOUS INSUFFICIENCY (CHRONIC) (PERIPHERAL): ICD-10-CM

## 2021-09-21 DIAGNOSIS — I50.33 ACUTE ON CHRONIC DIASTOLIC HEART FAILURE: ICD-10-CM

## 2021-09-21 DIAGNOSIS — E87.6 HYPOKALEMIA: ICD-10-CM

## 2021-09-21 DIAGNOSIS — R00.0 TACHYCARDIA, UNSPECIFIED: ICD-10-CM

## 2021-09-21 DIAGNOSIS — N18.1 CHRONIC KIDNEY DISEASE (CKD) STAGE G1/A1, GLOMERULAR FILTRATION RATE (GFR) EQUAL TO OR GREATER THAN 90 ML/MIN/1.73 SQUARE METER AND ALBUMINURIA CREATININE RATIO LESS THAN 30 MG/G: ICD-10-CM

## 2021-09-21 DIAGNOSIS — I51.89 DIASTOLIC DYSFUNCTION: ICD-10-CM

## 2021-09-21 PROCEDURE — 99214 PR OFFICE/OUTPT VISIT, EST, LEVL IV, 30-39 MIN: ICD-10-PCS | Mod: S$GLB,,, | Performed by: INTERNAL MEDICINE

## 2021-09-21 PROCEDURE — 99214 OFFICE O/P EST MOD 30 MIN: CPT | Mod: S$GLB,,, | Performed by: INTERNAL MEDICINE

## 2021-09-21 RX ORDER — METOPROLOL TARTRATE 25 MG/1
25 TABLET, FILM COATED ORAL 2 TIMES DAILY
Qty: 180 TABLET | Refills: 3
Start: 2021-09-21 | End: 2021-09-22

## 2021-09-21 RX ORDER — METOPROLOL TARTRATE 25 MG/1
25 TABLET, FILM COATED ORAL 2 TIMES DAILY
Qty: 180 TABLET | Refills: 3
Start: 2021-09-21 | End: 2021-09-21 | Stop reason: SDUPTHER

## 2021-09-21 RX ORDER — METOPROLOL TARTRATE 25 MG/1
25 TABLET, FILM COATED ORAL 2 TIMES DAILY
Qty: 180 TABLET | Refills: 1
Start: 2021-09-21 | End: 2021-09-21 | Stop reason: SDUPTHER

## 2021-09-21 NOTE — TELEPHONE ENCOUNTER
----- Message from Anam Pearson sent at 9/21/2021  2:46 PM CDT -----  Type:  RX Refill Request    Who Called: pt  Refill or New Rx: ref  RX Name and Strength: metoprolol tartrate (LOPRESSOR) 25 MG tablet  How is the patient currently taking it? (ex. 1XDay):  Is this a 30 day or 90 day RX:  [facility]  Local or Mail Order: local   Ordering Provider:  Would the patient rather a call back or a response via [facility]?   Best Call Back Number:  Additional Information: med still not available for pt

## 2021-09-22 ENCOUNTER — TELEPHONE (OUTPATIENT)
Dept: CARDIOLOGY | Facility: CLINIC | Age: 76
End: 2021-09-22

## 2021-09-22 ENCOUNTER — OFFICE VISIT (OUTPATIENT)
Dept: FAMILY MEDICINE | Facility: CLINIC | Age: 76
End: 2021-09-22
Payer: MEDICARE

## 2021-09-22 VITALS
BODY MASS INDEX: 21 KG/M2 | SYSTOLIC BLOOD PRESSURE: 110 MMHG | DIASTOLIC BLOOD PRESSURE: 60 MMHG | WEIGHT: 123 LBS | HEART RATE: 88 BPM | HEIGHT: 64 IN

## 2021-09-22 DIAGNOSIS — I50.33 ACUTE ON CHRONIC DIASTOLIC HEART FAILURE: ICD-10-CM

## 2021-09-22 DIAGNOSIS — R60.0 LOCALIZED EDEMA: ICD-10-CM

## 2021-09-22 DIAGNOSIS — J43.9 PULMONARY EMPHYSEMA, UNSPECIFIED EMPHYSEMA TYPE: ICD-10-CM

## 2021-09-22 DIAGNOSIS — I47.19 MULTIFOCAL ATRIAL TACHYCARDIA: Primary | ICD-10-CM

## 2021-09-22 DIAGNOSIS — N18.1 CHRONIC KIDNEY DISEASE (CKD) STAGE G1/A1, GLOMERULAR FILTRATION RATE (GFR) EQUAL TO OR GREATER THAN 90 ML/MIN/1.73 SQUARE METER AND ALBUMINURIA CREATININE RATIO LESS THAN 30 MG/G: ICD-10-CM

## 2021-09-22 DIAGNOSIS — N17.9 AKI (ACUTE KIDNEY INJURY): ICD-10-CM

## 2021-09-22 DIAGNOSIS — R53.81 PHYSICAL DEBILITY: ICD-10-CM

## 2021-09-22 PROCEDURE — 99214 PR OFFICE/OUTPT VISIT, EST, LEVL IV, 30-39 MIN: ICD-10-PCS | Mod: S$GLB,,, | Performed by: NURSE PRACTITIONER

## 2021-09-22 PROCEDURE — 99214 OFFICE O/P EST MOD 30 MIN: CPT | Mod: S$GLB,,, | Performed by: NURSE PRACTITIONER

## 2021-09-22 RX ORDER — METOPROLOL TARTRATE 25 MG/1
25 TABLET, FILM COATED ORAL 2 TIMES DAILY
Qty: 180 TABLET | Refills: 1 | Status: ON HOLD | OUTPATIENT
Start: 2021-09-22 | End: 2022-01-05 | Stop reason: HOSPADM

## 2021-09-23 ENCOUNTER — DOCUMENT SCAN (OUTPATIENT)
Dept: HOME HEALTH SERVICES | Facility: HOSPITAL | Age: 76
End: 2021-09-23

## 2021-09-23 ENCOUNTER — LAB VISIT (OUTPATIENT)
Dept: LAB | Facility: HOSPITAL | Age: 76
End: 2021-09-23
Attending: NURSE PRACTITIONER
Payer: MEDICARE

## 2021-09-23 DIAGNOSIS — N18.1 CHRONIC KIDNEY DISEASE, STAGE I: Primary | ICD-10-CM

## 2021-09-23 LAB
ALBUMIN SERPL BCP-MCNC: 2.8 G/DL (ref 3.5–5.2)
ALP SERPL-CCNC: 59 U/L (ref 55–135)
ALT SERPL W/O P-5'-P-CCNC: 28 U/L (ref 10–44)
ANION GAP SERPL CALC-SCNC: 16 MMOL/L (ref 8–16)
AST SERPL-CCNC: 22 U/L (ref 10–40)
BASOPHILS # BLD AUTO: 0.02 K/UL (ref 0–0.2)
BASOPHILS NFR BLD: 0.2 % (ref 0–1.9)
BILIRUB SERPL-MCNC: 1.3 MG/DL (ref 0.1–1)
BUN SERPL-MCNC: 38 MG/DL (ref 8–23)
CALCIUM SERPL-MCNC: 8.9 MG/DL (ref 8.7–10.5)
CHLORIDE SERPL-SCNC: 101 MMOL/L (ref 95–110)
CO2 SERPL-SCNC: 21 MMOL/L (ref 23–29)
CREAT SERPL-MCNC: 1.4 MG/DL (ref 0.5–1.4)
DIFFERENTIAL METHOD: ABNORMAL
EOSINOPHIL # BLD AUTO: 0 K/UL (ref 0–0.5)
EOSINOPHIL NFR BLD: 0.3 % (ref 0–8)
ERYTHROCYTE [DISTWIDTH] IN BLOOD BY AUTOMATED COUNT: 19.5 % (ref 11.5–14.5)
EST. GFR  (AFRICAN AMERICAN): 42.4 ML/MIN/1.73 M^2
EST. GFR  (NON AFRICAN AMERICAN): 36.8 ML/MIN/1.73 M^2
GLUCOSE SERPL-MCNC: 140 MG/DL (ref 70–110)
HCT VFR BLD AUTO: 30.6 % (ref 37–48.5)
HGB BLD-MCNC: 9.5 G/DL (ref 12–16)
IMM GRANULOCYTES # BLD AUTO: 0.12 K/UL (ref 0–0.04)
IMM GRANULOCYTES NFR BLD AUTO: 1 % (ref 0–0.5)
LYMPHOCYTES # BLD AUTO: 1.1 K/UL (ref 1–4.8)
LYMPHOCYTES NFR BLD: 9.4 % (ref 18–48)
MAGNESIUM SERPL-MCNC: 2 MG/DL (ref 1.6–2.6)
MCH RBC QN AUTO: 29.2 PG (ref 27–31)
MCHC RBC AUTO-ENTMCNC: 31 G/DL (ref 32–36)
MCV RBC AUTO: 94 FL (ref 82–98)
MONOCYTES # BLD AUTO: 0.4 K/UL (ref 0.3–1)
MONOCYTES NFR BLD: 3 % (ref 4–15)
NEUTROPHILS # BLD AUTO: 10.2 K/UL (ref 1.8–7.7)
NEUTROPHILS NFR BLD: 86.1 % (ref 38–73)
NRBC BLD-RTO: 0 /100 WBC
PLATELET # BLD AUTO: 274 K/UL (ref 150–450)
PMV BLD AUTO: 10.4 FL (ref 9.2–12.9)
POTASSIUM SERPL-SCNC: 3.3 MMOL/L (ref 3.5–5.1)
PROT SERPL-MCNC: 5.5 G/DL (ref 6–8.4)
RBC # BLD AUTO: 3.25 M/UL (ref 4–5.4)
SODIUM SERPL-SCNC: 138 MMOL/L (ref 136–145)
WBC # BLD AUTO: 11.82 K/UL (ref 3.9–12.7)

## 2021-09-23 PROCEDURE — 83735 ASSAY OF MAGNESIUM: CPT | Performed by: NURSE PRACTITIONER

## 2021-09-23 PROCEDURE — 80053 COMPREHEN METABOLIC PANEL: CPT | Performed by: NURSE PRACTITIONER

## 2021-09-23 PROCEDURE — 85025 COMPLETE CBC W/AUTO DIFF WBC: CPT | Performed by: NURSE PRACTITIONER

## 2021-09-24 ENCOUNTER — DOCUMENT SCAN (OUTPATIENT)
Dept: HOME HEALTH SERVICES | Facility: HOSPITAL | Age: 76
End: 2021-09-24

## 2021-09-27 ENCOUNTER — OFFICE VISIT (OUTPATIENT)
Dept: PULMONOLOGY | Facility: CLINIC | Age: 76
End: 2021-09-27
Payer: MEDICARE

## 2021-09-27 VITALS
DIASTOLIC BLOOD PRESSURE: 62 MMHG | BODY MASS INDEX: 20.43 KG/M2 | OXYGEN SATURATION: 98 % | HEART RATE: 65 BPM | SYSTOLIC BLOOD PRESSURE: 108 MMHG | WEIGHT: 119 LBS

## 2021-09-27 DIAGNOSIS — J44.9 CHRONIC OBSTRUCTIVE PULMONARY DISEASE, UNSPECIFIED COPD TYPE: ICD-10-CM

## 2021-09-27 DIAGNOSIS — I27.20 PULMONARY HYPERTENSION: ICD-10-CM

## 2021-09-27 PROCEDURE — 99214 OFFICE O/P EST MOD 30 MIN: CPT | Mod: S$GLB,,, | Performed by: INTERNAL MEDICINE

## 2021-09-27 PROCEDURE — 99214 PR OFFICE/OUTPT VISIT, EST, LEVL IV, 30-39 MIN: ICD-10-PCS | Mod: S$GLB,,, | Performed by: INTERNAL MEDICINE

## 2021-09-28 ENCOUNTER — HOSPITAL ENCOUNTER (INPATIENT)
Facility: HOSPITAL | Age: 76
LOS: 2 days | Discharge: HOME HEALTH | DRG: 291 | End: 2021-10-01
Attending: EMERGENCY MEDICINE | Admitting: HOSPITALIST
Payer: MEDICARE

## 2021-09-28 DIAGNOSIS — R07.9 CHEST PAIN: ICD-10-CM

## 2021-09-28 DIAGNOSIS — I49.9 ARRHYTHMIA: ICD-10-CM

## 2021-09-28 DIAGNOSIS — R06.00 DYSPNEA, UNSPECIFIED TYPE: Primary | ICD-10-CM

## 2021-09-28 DIAGNOSIS — I50.9 CONGESTIVE HEART FAILURE, UNSPECIFIED HF CHRONICITY, UNSPECIFIED HEART FAILURE TYPE: ICD-10-CM

## 2021-09-28 DIAGNOSIS — E87.6 HYPOKALEMIA: ICD-10-CM

## 2021-09-28 DIAGNOSIS — R06.02 SHORTNESS OF BREATH: ICD-10-CM

## 2021-09-28 DIAGNOSIS — I50.32 CHRONIC DIASTOLIC HEART FAILURE: Chronic | ICD-10-CM

## 2021-09-28 DIAGNOSIS — N17.9 AKI (ACUTE KIDNEY INJURY): ICD-10-CM

## 2021-09-28 PROBLEM — N18.31 STAGE 3A CHRONIC KIDNEY DISEASE: Status: ACTIVE | Noted: 2021-09-28

## 2021-09-28 PROBLEM — R63.8 SALT CRAVING: Status: ACTIVE | Noted: 2021-09-28

## 2021-09-28 LAB
ALBUMIN SERPL BCP-MCNC: 2.8 G/DL (ref 3.5–5.2)
ALBUMIN SERPL BCP-MCNC: 3 G/DL (ref 3.5–5.2)
ALP SERPL-CCNC: 57 U/L (ref 55–135)
ALT SERPL W/O P-5'-P-CCNC: 30 U/L (ref 10–44)
ANION GAP SERPL CALC-SCNC: 11 MMOL/L (ref 8–16)
ANION GAP SERPL CALC-SCNC: 13 MMOL/L (ref 8–16)
AST SERPL-CCNC: 20 U/L (ref 10–40)
BACTERIA #/AREA URNS HPF: NEGATIVE /HPF
BASOPHILS # BLD AUTO: 0.01 K/UL (ref 0–0.2)
BASOPHILS NFR BLD: 0.1 % (ref 0–1.9)
BILIRUB SERPL-MCNC: 1.8 MG/DL (ref 0.1–1)
BILIRUB UR QL STRIP: NEGATIVE
BNP SERPL-MCNC: 2662 PG/ML (ref 0–99)
BUN SERPL-MCNC: 53 MG/DL (ref 8–23)
BUN SERPL-MCNC: 58 MG/DL (ref 8–23)
CALCIUM SERPL-MCNC: 8.4 MG/DL (ref 8.7–10.5)
CALCIUM SERPL-MCNC: 8.5 MG/DL (ref 8.7–10.5)
CHLORIDE SERPL-SCNC: 102 MMOL/L (ref 95–110)
CHLORIDE SERPL-SCNC: 105 MMOL/L (ref 95–110)
CLARITY UR: CLEAR
CO2 SERPL-SCNC: 27 MMOL/L (ref 23–29)
CO2 SERPL-SCNC: 29 MMOL/L (ref 23–29)
COLOR UR: COLORLESS
CREAT SERPL-MCNC: 1.8 MG/DL (ref 0.5–1.4)
CREAT SERPL-MCNC: 1.8 MG/DL (ref 0.5–1.4)
DIFFERENTIAL METHOD: ABNORMAL
EOSINOPHIL # BLD AUTO: 0 K/UL (ref 0–0.5)
EOSINOPHIL NFR BLD: 0.2 % (ref 0–8)
ERYTHROCYTE [DISTWIDTH] IN BLOOD BY AUTOMATED COUNT: 20.3 % (ref 11.5–14.5)
EST. GFR  (AFRICAN AMERICAN): 31.3 ML/MIN/1.73 M^2
EST. GFR  (AFRICAN AMERICAN): 31.3 ML/MIN/1.73 M^2
EST. GFR  (NON AFRICAN AMERICAN): 27.1 ML/MIN/1.73 M^2
EST. GFR  (NON AFRICAN AMERICAN): 27.1 ML/MIN/1.73 M^2
GLUCOSE SERPL-MCNC: 116 MG/DL (ref 70–110)
GLUCOSE SERPL-MCNC: 98 MG/DL (ref 70–110)
GLUCOSE UR QL STRIP: NEGATIVE
HCT VFR BLD AUTO: 33.6 % (ref 37–48.5)
HGB BLD-MCNC: 10.4 G/DL (ref 12–16)
HGB UR QL STRIP: ABNORMAL
HYALINE CASTS #/AREA URNS LPF: 1 /LPF
IMM GRANULOCYTES # BLD AUTO: 0.07 K/UL (ref 0–0.04)
IMM GRANULOCYTES NFR BLD AUTO: 0.6 % (ref 0–0.5)
KETONES UR QL STRIP: NEGATIVE
LEUKOCYTE ESTERASE UR QL STRIP: ABNORMAL
LYMPHOCYTES # BLD AUTO: 0.8 K/UL (ref 1–4.8)
LYMPHOCYTES NFR BLD: 6.4 % (ref 18–48)
MCH RBC QN AUTO: 30.1 PG (ref 27–31)
MCHC RBC AUTO-ENTMCNC: 31 G/DL (ref 32–36)
MCV RBC AUTO: 97 FL (ref 82–98)
MICROSCOPIC COMMENT: NORMAL
MONOCYTES # BLD AUTO: 0.3 K/UL (ref 0.3–1)
MONOCYTES NFR BLD: 2.1 % (ref 4–15)
NEUTROPHILS # BLD AUTO: 10.8 K/UL (ref 1.8–7.7)
NEUTROPHILS NFR BLD: 90.6 % (ref 38–73)
NITRITE UR QL STRIP: NEGATIVE
NRBC BLD-RTO: 0 /100 WBC
PH UR STRIP: 6 [PH] (ref 5–8)
PHOSPHATE SERPL-MCNC: 3.8 MG/DL (ref 2.7–4.5)
PLATELET # BLD AUTO: 283 K/UL (ref 150–450)
PMV BLD AUTO: 9.6 FL (ref 9.2–12.9)
POTASSIUM SERPL-SCNC: 2.8 MMOL/L (ref 3.5–5.1)
POTASSIUM SERPL-SCNC: 3.4 MMOL/L (ref 3.5–5.1)
PROT SERPL-MCNC: 5.8 G/DL (ref 6–8.4)
PROT UR QL STRIP: NEGATIVE
RBC # BLD AUTO: 3.46 M/UL (ref 4–5.4)
RBC #/AREA URNS HPF: 1 /HPF (ref 0–4)
SARS-COV-2 RDRP RESP QL NAA+PROBE: NORMAL
SARS-COV-2 RDRP RESP QL NAA+PROBE: NORMAL
SODIUM SERPL-SCNC: 143 MMOL/L (ref 136–145)
SODIUM SERPL-SCNC: 144 MMOL/L (ref 136–145)
SP GR UR STRIP: 1 (ref 1–1.03)
SQUAMOUS #/AREA URNS HPF: 1 /HPF
TROPONIN I SERPL DL<=0.01 NG/ML-MCNC: <0.03 NG/ML
URN SPEC COLLECT METH UR: ABNORMAL
UROBILINOGEN UR STRIP-ACNC: NEGATIVE EU/DL
WBC # BLD AUTO: 11.92 K/UL (ref 3.9–12.7)
WBC #/AREA URNS HPF: 3 /HPF (ref 0–5)

## 2021-09-28 PROCEDURE — 99900031 HC PATIENT EDUCATION (STAT)

## 2021-09-28 PROCEDURE — 93005 ELECTROCARDIOGRAM TRACING: CPT | Performed by: SPECIALIST

## 2021-09-28 PROCEDURE — G0378 HOSPITAL OBSERVATION PER HR: HCPCS

## 2021-09-28 PROCEDURE — 80069 RENAL FUNCTION PANEL: CPT | Performed by: HOSPITALIST

## 2021-09-28 PROCEDURE — 84484 ASSAY OF TROPONIN QUANT: CPT | Performed by: EMERGENCY MEDICINE

## 2021-09-28 PROCEDURE — 25000003 PHARM REV CODE 250: Performed by: EMERGENCY MEDICINE

## 2021-09-28 PROCEDURE — U0003 INFECTIOUS AGENT DETECTION BY NUCLEIC ACID (DNA OR RNA); SEVERE ACUTE RESPIRATORY SYNDROME CORONAVIRUS 2 (SARS-COV-2) (CORONAVIRUS DISEASE [COVID-19]), AMPLIFIED PROBE TECHNIQUE, MAKING USE OF HIGH THROUGHPUT TECHNOLOGIES AS DESCRIBED BY CMS-2020-01-R: HCPCS | Performed by: HOSPITALIST

## 2021-09-28 PROCEDURE — 36415 COLL VENOUS BLD VENIPUNCTURE: CPT | Performed by: HOSPITALIST

## 2021-09-28 PROCEDURE — U0002 COVID-19 LAB TEST NON-CDC: HCPCS | Mod: 91 | Performed by: HOSPITALIST

## 2021-09-28 PROCEDURE — 81001 URINALYSIS AUTO W/SCOPE: CPT | Performed by: HOSPITALIST

## 2021-09-28 PROCEDURE — 99285 EMERGENCY DEPT VISIT HI MDM: CPT | Mod: 25

## 2021-09-28 PROCEDURE — 99900035 HC TECH TIME PER 15 MIN (STAT)

## 2021-09-28 PROCEDURE — 93010 EKG 12-LEAD: ICD-10-PCS | Mod: ,,, | Performed by: SPECIALIST

## 2021-09-28 PROCEDURE — 85025 COMPLETE CBC W/AUTO DIFF WBC: CPT | Performed by: EMERGENCY MEDICINE

## 2021-09-28 PROCEDURE — U0005 INFEC AGEN DETEC AMPLI PROBE: HCPCS | Performed by: HOSPITALIST

## 2021-09-28 PROCEDURE — 63600175 PHARM REV CODE 636 W HCPCS: Performed by: HOSPITALIST

## 2021-09-28 PROCEDURE — U0002 COVID-19 LAB TEST NON-CDC: HCPCS | Performed by: EMERGENCY MEDICINE

## 2021-09-28 PROCEDURE — 83880 ASSAY OF NATRIURETIC PEPTIDE: CPT | Performed by: EMERGENCY MEDICINE

## 2021-09-28 PROCEDURE — 96374 THER/PROPH/DIAG INJ IV PUSH: CPT

## 2021-09-28 PROCEDURE — 96376 TX/PRO/DX INJ SAME DRUG ADON: CPT

## 2021-09-28 PROCEDURE — 63600175 PHARM REV CODE 636 W HCPCS: Performed by: EMERGENCY MEDICINE

## 2021-09-28 PROCEDURE — 80053 COMPREHEN METABOLIC PANEL: CPT | Performed by: EMERGENCY MEDICINE

## 2021-09-28 PROCEDURE — 25000003 PHARM REV CODE 250: Performed by: HOSPITALIST

## 2021-09-28 PROCEDURE — 94761 N-INVAS EAR/PLS OXIMETRY MLT: CPT

## 2021-09-28 PROCEDURE — 93010 ELECTROCARDIOGRAM REPORT: CPT | Mod: ,,, | Performed by: SPECIALIST

## 2021-09-28 RX ORDER — POTASSIUM CHLORIDE 20 MEQ/1
60 TABLET, EXTENDED RELEASE ORAL ONCE
Status: COMPLETED | OUTPATIENT
Start: 2021-09-28 | End: 2021-09-28

## 2021-09-28 RX ORDER — POTASSIUM CHLORIDE 7.45 MG/ML
40 INJECTION INTRAVENOUS
Status: DISCONTINUED | OUTPATIENT
Start: 2021-09-28 | End: 2021-10-01 | Stop reason: HOSPADM

## 2021-09-28 RX ORDER — METOPROLOL TARTRATE 25 MG/1
25 TABLET, FILM COATED ORAL 2 TIMES DAILY
Status: DISCONTINUED | OUTPATIENT
Start: 2021-09-28 | End: 2021-10-01 | Stop reason: HOSPADM

## 2021-09-28 RX ORDER — LANOLIN ALCOHOL/MO/W.PET/CERES
1000 CREAM (GRAM) TOPICAL DAILY
Status: DISCONTINUED | OUTPATIENT
Start: 2021-09-28 | End: 2021-10-01 | Stop reason: HOSPADM

## 2021-09-28 RX ORDER — ONDANSETRON 2 MG/ML
4 INJECTION INTRAMUSCULAR; INTRAVENOUS EVERY 8 HOURS PRN
Status: DISCONTINUED | OUTPATIENT
Start: 2021-09-28 | End: 2021-10-01 | Stop reason: HOSPADM

## 2021-09-28 RX ORDER — MAGNESIUM SULFATE HEPTAHYDRATE 40 MG/ML
2 INJECTION, SOLUTION INTRAVENOUS
Status: DISCONTINUED | OUTPATIENT
Start: 2021-09-28 | End: 2021-10-01 | Stop reason: HOSPADM

## 2021-09-28 RX ORDER — POTASSIUM CHLORIDE 20 MEQ/1
40 TABLET, EXTENDED RELEASE ORAL ONCE
Status: COMPLETED | OUTPATIENT
Start: 2021-09-28 | End: 2021-09-28

## 2021-09-28 RX ORDER — POTASSIUM CHLORIDE 20 MEQ/1
40 TABLET, EXTENDED RELEASE ORAL
Status: DISCONTINUED | OUTPATIENT
Start: 2021-09-28 | End: 2021-10-01 | Stop reason: HOSPADM

## 2021-09-28 RX ORDER — SODIUM CHLORIDE 0.9 % (FLUSH) 0.9 %
10 SYRINGE (ML) INJECTION EVERY 12 HOURS PRN
Status: DISCONTINUED | OUTPATIENT
Start: 2021-09-28 | End: 2021-10-01 | Stop reason: HOSPADM

## 2021-09-28 RX ORDER — POTASSIUM CHLORIDE 20 MEQ/1
20 TABLET, EXTENDED RELEASE ORAL
Status: DISCONTINUED | OUTPATIENT
Start: 2021-09-28 | End: 2021-10-01 | Stop reason: HOSPADM

## 2021-09-28 RX ORDER — SIMETHICONE 80 MG
1 TABLET,CHEWABLE ORAL 4 TIMES DAILY PRN
Status: DISCONTINUED | OUTPATIENT
Start: 2021-09-28 | End: 2021-10-01 | Stop reason: HOSPADM

## 2021-09-28 RX ORDER — LANOLIN ALCOHOL/MO/W.PET/CERES
800 CREAM (GRAM) TOPICAL
Status: DISCONTINUED | OUTPATIENT
Start: 2021-09-28 | End: 2021-10-01 | Stop reason: HOSPADM

## 2021-09-28 RX ORDER — ACETAMINOPHEN 325 MG/1
650 TABLET ORAL EVERY 4 HOURS PRN
Status: DISCONTINUED | OUTPATIENT
Start: 2021-09-28 | End: 2021-10-01 | Stop reason: HOSPADM

## 2021-09-28 RX ORDER — FUROSEMIDE 10 MG/ML
40 INJECTION INTRAMUSCULAR; INTRAVENOUS
Status: COMPLETED | OUTPATIENT
Start: 2021-09-28 | End: 2021-09-28

## 2021-09-28 RX ORDER — TALC
6 POWDER (GRAM) TOPICAL NIGHTLY PRN
Status: DISCONTINUED | OUTPATIENT
Start: 2021-09-28 | End: 2021-10-01 | Stop reason: HOSPADM

## 2021-09-28 RX ORDER — MAGNESIUM SULFATE 1 G/100ML
1 INJECTION INTRAVENOUS
Status: DISCONTINUED | OUTPATIENT
Start: 2021-09-28 | End: 2021-10-01 | Stop reason: HOSPADM

## 2021-09-28 RX ORDER — FUROSEMIDE 10 MG/ML
40 INJECTION INTRAMUSCULAR; INTRAVENOUS ONCE
Status: COMPLETED | OUTPATIENT
Start: 2021-09-28 | End: 2021-09-28

## 2021-09-28 RX ORDER — POTASSIUM CHLORIDE 7.45 MG/ML
20 INJECTION INTRAVENOUS
Status: DISCONTINUED | OUTPATIENT
Start: 2021-09-28 | End: 2021-10-01 | Stop reason: HOSPADM

## 2021-09-28 RX ORDER — MAGNESIUM SULFATE HEPTAHYDRATE 40 MG/ML
4 INJECTION, SOLUTION INTRAVENOUS
Status: DISCONTINUED | OUTPATIENT
Start: 2021-09-28 | End: 2021-10-01 | Stop reason: HOSPADM

## 2021-09-28 RX ORDER — IPRATROPIUM BROMIDE AND ALBUTEROL SULFATE 2.5; .5 MG/3ML; MG/3ML
3 SOLUTION RESPIRATORY (INHALATION) EVERY 6 HOURS PRN
Status: DISCONTINUED | OUTPATIENT
Start: 2021-09-28 | End: 2021-10-01 | Stop reason: HOSPADM

## 2021-09-28 RX ORDER — SERTRALINE HYDROCHLORIDE 25 MG/1
25 TABLET, FILM COATED ORAL DAILY
Status: DISCONTINUED | OUTPATIENT
Start: 2021-09-28 | End: 2021-10-01

## 2021-09-28 RX ORDER — NALOXONE HCL 0.4 MG/ML
0.02 VIAL (ML) INJECTION
Status: DISCONTINUED | OUTPATIENT
Start: 2021-09-28 | End: 2021-10-01 | Stop reason: HOSPADM

## 2021-09-28 RX ORDER — ALBUTEROL SULFATE 90 UG/1
2 AEROSOL, METERED RESPIRATORY (INHALATION) EVERY 6 HOURS PRN
Status: DISCONTINUED | OUTPATIENT
Start: 2021-09-28 | End: 2021-10-01 | Stop reason: HOSPADM

## 2021-09-28 RX ORDER — SODIUM,POTASSIUM PHOSPHATES 280-250MG
1 POWDER IN PACKET (EA) ORAL ONCE
Status: DISCONTINUED | OUTPATIENT
Start: 2021-09-28 | End: 2021-09-28

## 2021-09-28 RX ADMIN — FUROSEMIDE 40 MG: 10 INJECTION, SOLUTION INTRAMUSCULAR; INTRAVENOUS at 07:09

## 2021-09-28 RX ADMIN — POTASSIUM CHLORIDE 40 MEQ: 20 TABLET, EXTENDED RELEASE ORAL at 10:09

## 2021-09-28 RX ADMIN — SERTRALINE HYDROCHLORIDE 25 MG: 25 TABLET ORAL at 01:09

## 2021-09-28 RX ADMIN — CYANOCOBALAMIN TAB 1000 MCG 1000 MCG: 1000 TAB at 01:09

## 2021-09-28 RX ADMIN — FUROSEMIDE 40 MG: 10 INJECTION, SOLUTION INTRAVENOUS at 08:09

## 2021-09-28 RX ADMIN — POTASSIUM BICARBONATE 50 MEQ: 977.5 TABLET, EFFERVESCENT ORAL at 01:09

## 2021-09-28 RX ADMIN — MELATONIN 6 MG: at 10:09

## 2021-09-28 RX ADMIN — POTASSIUM CHLORIDE 60 MEQ: 1500 TABLET, EXTENDED RELEASE ORAL at 07:09

## 2021-09-28 RX ADMIN — APIXABAN 2.5 MG: 2.5 TABLET, FILM COATED ORAL at 01:09

## 2021-09-28 RX ADMIN — ACETAMINOPHEN 650 MG: 325 TABLET, FILM COATED ORAL at 07:09

## 2021-09-28 RX ADMIN — ACETAMINOPHEN 650 MG: 325 TABLET, FILM COATED ORAL at 01:09

## 2021-09-28 RX ADMIN — APIXABAN 2.5 MG: 2.5 TABLET, FILM COATED ORAL at 08:09

## 2021-09-29 PROBLEM — J96.11 CHRONIC RESPIRATORY FAILURE WITH HYPOXIA: Chronic | Status: ACTIVE | Noted: 2021-09-29

## 2021-09-29 PROBLEM — I51.89 DIASTOLIC DYSFUNCTION: Chronic | Status: ACTIVE | Noted: 2021-08-03

## 2021-09-29 PROBLEM — J44.9 COPD (CHRONIC OBSTRUCTIVE PULMONARY DISEASE): Chronic | Status: ACTIVE | Noted: 2021-08-03

## 2021-09-29 PROBLEM — I27.20 PULMONARY HYPERTENSION: Chronic | Status: ACTIVE | Noted: 2021-08-03

## 2021-09-29 LAB
ALBUMIN SERPL BCP-MCNC: 2.8 G/DL (ref 3.5–5.2)
ALP SERPL-CCNC: 53 U/L (ref 55–135)
ALT SERPL W/O P-5'-P-CCNC: 28 U/L (ref 10–44)
ANION GAP SERPL CALC-SCNC: 9 MMOL/L (ref 8–16)
AST SERPL-CCNC: 17 U/L (ref 10–40)
BASOPHILS # BLD AUTO: 0.01 K/UL (ref 0–0.2)
BASOPHILS NFR BLD: 0.1 % (ref 0–1.9)
BILIRUB SERPL-MCNC: 1.7 MG/DL (ref 0.1–1)
BUN SERPL-MCNC: 52 MG/DL (ref 8–23)
CALCIUM SERPL-MCNC: 8.7 MG/DL (ref 8.7–10.5)
CHLORIDE SERPL-SCNC: 101 MMOL/L (ref 95–110)
CO2 SERPL-SCNC: 34 MMOL/L (ref 23–29)
CREAT SERPL-MCNC: 1.7 MG/DL (ref 0.5–1.4)
DIFFERENTIAL METHOD: ABNORMAL
EOSINOPHIL # BLD AUTO: 0.1 K/UL (ref 0–0.5)
EOSINOPHIL NFR BLD: 0.7 % (ref 0–8)
ERYTHROCYTE [DISTWIDTH] IN BLOOD BY AUTOMATED COUNT: 20.7 % (ref 11.5–14.5)
EST. GFR  (AFRICAN AMERICAN): 33.5 ML/MIN/1.73 M^2
EST. GFR  (NON AFRICAN AMERICAN): 29.1 ML/MIN/1.73 M^2
GLUCOSE SERPL-MCNC: 122 MG/DL (ref 70–110)
HCT VFR BLD AUTO: 32.2 % (ref 37–48.5)
HGB BLD-MCNC: 9.8 G/DL (ref 12–16)
IMM GRANULOCYTES # BLD AUTO: 0.06 K/UL (ref 0–0.04)
IMM GRANULOCYTES NFR BLD AUTO: 0.6 % (ref 0–0.5)
LYMPHOCYTES # BLD AUTO: 1.1 K/UL (ref 1–4.8)
LYMPHOCYTES NFR BLD: 11.1 % (ref 18–48)
MAGNESIUM SERPL-MCNC: 1.9 MG/DL (ref 1.6–2.6)
MCH RBC QN AUTO: 29.6 PG (ref 27–31)
MCHC RBC AUTO-ENTMCNC: 30.4 G/DL (ref 32–36)
MCV RBC AUTO: 97 FL (ref 82–98)
MONOCYTES # BLD AUTO: 0.3 K/UL (ref 0.3–1)
MONOCYTES NFR BLD: 3.2 % (ref 4–15)
NEUTROPHILS # BLD AUTO: 8.3 K/UL (ref 1.8–7.7)
NEUTROPHILS NFR BLD: 84.3 % (ref 38–73)
NRBC BLD-RTO: 0 /100 WBC
PHOSPHATE SERPL-MCNC: 3.7 MG/DL (ref 2.7–4.5)
PLATELET # BLD AUTO: 278 K/UL (ref 150–450)
PMV BLD AUTO: 10.2 FL (ref 9.2–12.9)
POTASSIUM SERPL-SCNC: 4 MMOL/L (ref 3.5–5.1)
PROT SERPL-MCNC: 5.2 G/DL (ref 6–8.4)
RBC # BLD AUTO: 3.31 M/UL (ref 4–5.4)
SARS-COV-2 RNA RESP QL NAA+PROBE: NOT DETECTED
SARS-COV-2- CYCLE NUMBER: NORMAL
SODIUM SERPL-SCNC: 144 MMOL/L (ref 136–145)
WBC # BLD AUTO: 9.88 K/UL (ref 3.9–12.7)

## 2021-09-29 PROCEDURE — 25000003 PHARM REV CODE 250: Performed by: HOSPITALIST

## 2021-09-29 PROCEDURE — 21400001 HC TELEMETRY ROOM

## 2021-09-29 PROCEDURE — 36415 COLL VENOUS BLD VENIPUNCTURE: CPT | Performed by: HOSPITALIST

## 2021-09-29 PROCEDURE — 99900035 HC TECH TIME PER 15 MIN (STAT)

## 2021-09-29 PROCEDURE — 83735 ASSAY OF MAGNESIUM: CPT | Performed by: HOSPITALIST

## 2021-09-29 PROCEDURE — 96375 TX/PRO/DX INJ NEW DRUG ADDON: CPT

## 2021-09-29 PROCEDURE — 84100 ASSAY OF PHOSPHORUS: CPT | Performed by: HOSPITALIST

## 2021-09-29 PROCEDURE — 85025 COMPLETE CBC W/AUTO DIFF WBC: CPT | Performed by: HOSPITALIST

## 2021-09-29 PROCEDURE — 25000003 PHARM REV CODE 250: Performed by: FAMILY MEDICINE

## 2021-09-29 PROCEDURE — 63600175 PHARM REV CODE 636 W HCPCS: Performed by: HOSPITALIST

## 2021-09-29 PROCEDURE — 94761 N-INVAS EAR/PLS OXIMETRY MLT: CPT

## 2021-09-29 PROCEDURE — 63600175 PHARM REV CODE 636 W HCPCS: Performed by: FAMILY MEDICINE

## 2021-09-29 PROCEDURE — 94640 AIRWAY INHALATION TREATMENT: CPT

## 2021-09-29 PROCEDURE — 80053 COMPREHEN METABOLIC PANEL: CPT | Performed by: HOSPITALIST

## 2021-09-29 PROCEDURE — 27000221 HC OXYGEN, UP TO 24 HOURS

## 2021-09-29 PROCEDURE — 25000242 PHARM REV CODE 250 ALT 637 W/ HCPCS: Performed by: FAMILY MEDICINE

## 2021-09-29 PROCEDURE — 99900031 HC PATIENT EDUCATION (STAT)

## 2021-09-29 RX ORDER — ALPRAZOLAM 0.25 MG/1
0.25 TABLET ORAL NIGHTLY PRN
Status: DISCONTINUED | OUTPATIENT
Start: 2021-09-29 | End: 2021-10-01 | Stop reason: HOSPADM

## 2021-09-29 RX ORDER — FLUTICASONE FUROATE AND VILANTEROL 100; 25 UG/1; UG/1
1 POWDER RESPIRATORY (INHALATION) DAILY
Status: DISCONTINUED | OUTPATIENT
Start: 2021-09-29 | End: 2021-10-01 | Stop reason: HOSPADM

## 2021-09-29 RX ORDER — FUROSEMIDE 10 MG/ML
20 INJECTION INTRAMUSCULAR; INTRAVENOUS ONCE
Status: COMPLETED | OUTPATIENT
Start: 2021-09-29 | End: 2021-09-29

## 2021-09-29 RX ORDER — FUROSEMIDE 10 MG/ML
20 INJECTION INTRAMUSCULAR; INTRAVENOUS
Status: DISCONTINUED | OUTPATIENT
Start: 2021-09-29 | End: 2021-09-30

## 2021-09-29 RX ADMIN — FLUTICASONE FUROATE AND VILANTEROL TRIFENATATE 1 PUFF: 100; 25 POWDER RESPIRATORY (INHALATION) at 10:09

## 2021-09-29 RX ADMIN — APIXABAN 2.5 MG: 2.5 TABLET, FILM COATED ORAL at 08:09

## 2021-09-29 RX ADMIN — CYANOCOBALAMIN TAB 1000 MCG 1000 MCG: 1000 TAB at 08:09

## 2021-09-29 RX ADMIN — APIXABAN 2.5 MG: 2.5 TABLET, FILM COATED ORAL at 09:09

## 2021-09-29 RX ADMIN — ACETAMINOPHEN 650 MG: 325 TABLET, FILM COATED ORAL at 08:09

## 2021-09-29 RX ADMIN — FUROSEMIDE 20 MG: 10 INJECTION, SOLUTION INTRAVENOUS at 11:09

## 2021-09-29 RX ADMIN — METOPROLOL TARTRATE 25 MG: 25 TABLET, FILM COATED ORAL at 09:09

## 2021-09-29 RX ADMIN — ACETAMINOPHEN 650 MG: 325 TABLET, FILM COATED ORAL at 03:09

## 2021-09-29 RX ADMIN — FUROSEMIDE 20 MG: 10 INJECTION, SOLUTION INTRAVENOUS at 09:09

## 2021-09-29 RX ADMIN — ONDANSETRON 4 MG: 2 INJECTION INTRAMUSCULAR; INTRAVENOUS at 08:09

## 2021-09-29 RX ADMIN — SERTRALINE HYDROCHLORIDE 25 MG: 25 TABLET ORAL at 08:09

## 2021-09-29 RX ADMIN — ACETAZOLAMIDE SODIUM 250 MG: 500 INJECTION, POWDER, LYOPHILIZED, FOR SOLUTION INTRAVENOUS at 04:09

## 2021-09-29 RX ADMIN — ALPRAZOLAM 0.25 MG: 0.25 TABLET ORAL at 09:09

## 2021-09-29 RX ADMIN — ACETAMINOPHEN 650 MG: 325 TABLET, FILM COATED ORAL at 09:09

## 2021-09-29 RX ADMIN — TIOTROPIUM BROMIDE INHALATION SPRAY 2 PUFF: 3.12 SPRAY, METERED RESPIRATORY (INHALATION) at 10:09

## 2021-09-30 PROBLEM — L03.90 CELLULITIS: Status: ACTIVE | Noted: 2021-09-30

## 2021-09-30 LAB
ANION GAP SERPL CALC-SCNC: 11 MMOL/L (ref 8–16)
BASOPHILS # BLD AUTO: 0.01 K/UL (ref 0–0.2)
BASOPHILS NFR BLD: 0.1 % (ref 0–1.9)
BUN SERPL-MCNC: 43 MG/DL (ref 8–23)
CALCIUM SERPL-MCNC: 8 MG/DL (ref 8.7–10.5)
CHLORIDE SERPL-SCNC: 99 MMOL/L (ref 95–110)
CO2 SERPL-SCNC: 32 MMOL/L (ref 23–29)
CREAT SERPL-MCNC: 1.7 MG/DL (ref 0.5–1.4)
DIFFERENTIAL METHOD: ABNORMAL
EOSINOPHIL # BLD AUTO: 0.2 K/UL (ref 0–0.5)
EOSINOPHIL NFR BLD: 2.8 % (ref 0–8)
ERYTHROCYTE [DISTWIDTH] IN BLOOD BY AUTOMATED COUNT: 20.4 % (ref 11.5–14.5)
EST. GFR  (AFRICAN AMERICAN): 33.5 ML/MIN/1.73 M^2
EST. GFR  (NON AFRICAN AMERICAN): 29.1 ML/MIN/1.73 M^2
GLUCOSE SERPL-MCNC: 97 MG/DL (ref 70–110)
HCT VFR BLD AUTO: 32.4 % (ref 37–48.5)
HGB BLD-MCNC: 9.5 G/DL (ref 12–16)
IMM GRANULOCYTES # BLD AUTO: 0.05 K/UL (ref 0–0.04)
IMM GRANULOCYTES NFR BLD AUTO: 0.6 % (ref 0–0.5)
LYMPHOCYTES # BLD AUTO: 0.9 K/UL (ref 1–4.8)
LYMPHOCYTES NFR BLD: 11.3 % (ref 18–48)
MAGNESIUM SERPL-MCNC: 1.9 MG/DL (ref 1.6–2.6)
MCH RBC QN AUTO: 29.9 PG (ref 27–31)
MCHC RBC AUTO-ENTMCNC: 29.3 G/DL (ref 32–36)
MCV RBC AUTO: 101 FL (ref 82–98)
MONOCYTES # BLD AUTO: 0.2 K/UL (ref 0.3–1)
MONOCYTES NFR BLD: 2.7 % (ref 4–15)
NEUTROPHILS # BLD AUTO: 6.4 K/UL (ref 1.8–7.7)
NEUTROPHILS NFR BLD: 82.5 % (ref 38–73)
NRBC BLD-RTO: 0 /100 WBC
PHOSPHATE SERPL-MCNC: 3.4 MG/DL (ref 2.7–4.5)
PLATELET # BLD AUTO: 244 K/UL (ref 150–450)
PMV BLD AUTO: 9.7 FL (ref 9.2–12.9)
POTASSIUM SERPL-SCNC: 3.8 MMOL/L (ref 3.5–5.1)
RBC # BLD AUTO: 3.18 M/UL (ref 4–5.4)
SODIUM SERPL-SCNC: 142 MMOL/L (ref 136–145)
WBC # BLD AUTO: 7.8 K/UL (ref 3.9–12.7)

## 2021-09-30 PROCEDURE — 99900035 HC TECH TIME PER 15 MIN (STAT)

## 2021-09-30 PROCEDURE — 97161 PT EVAL LOW COMPLEX 20 MIN: CPT

## 2021-09-30 PROCEDURE — 99900031 HC PATIENT EDUCATION (STAT)

## 2021-09-30 PROCEDURE — 97165 OT EVAL LOW COMPLEX 30 MIN: CPT

## 2021-09-30 PROCEDURE — 25000003 PHARM REV CODE 250: Performed by: HOSPITALIST

## 2021-09-30 PROCEDURE — 80048 BASIC METABOLIC PNL TOTAL CA: CPT | Performed by: FAMILY MEDICINE

## 2021-09-30 PROCEDURE — 97116 GAIT TRAINING THERAPY: CPT

## 2021-09-30 PROCEDURE — 63600175 PHARM REV CODE 636 W HCPCS: Performed by: HOSPITALIST

## 2021-09-30 PROCEDURE — 36415 COLL VENOUS BLD VENIPUNCTURE: CPT | Performed by: HOSPITALIST

## 2021-09-30 PROCEDURE — 85025 COMPLETE CBC W/AUTO DIFF WBC: CPT | Performed by: HOSPITALIST

## 2021-09-30 PROCEDURE — 93010 ELECTROCARDIOGRAM REPORT: CPT | Mod: ,,, | Performed by: INTERNAL MEDICINE

## 2021-09-30 PROCEDURE — 21400001 HC TELEMETRY ROOM

## 2021-09-30 PROCEDURE — 94640 AIRWAY INHALATION TREATMENT: CPT

## 2021-09-30 PROCEDURE — 25000003 PHARM REV CODE 250: Performed by: FAMILY MEDICINE

## 2021-09-30 PROCEDURE — 93005 ELECTROCARDIOGRAM TRACING: CPT | Performed by: INTERNAL MEDICINE

## 2021-09-30 PROCEDURE — 63600175 PHARM REV CODE 636 W HCPCS: Performed by: FAMILY MEDICINE

## 2021-09-30 PROCEDURE — 93010 EKG 12-LEAD: ICD-10-PCS | Mod: ,,, | Performed by: INTERNAL MEDICINE

## 2021-09-30 PROCEDURE — 94761 N-INVAS EAR/PLS OXIMETRY MLT: CPT

## 2021-09-30 PROCEDURE — 84100 ASSAY OF PHOSPHORUS: CPT | Performed by: HOSPITALIST

## 2021-09-30 PROCEDURE — 27000221 HC OXYGEN, UP TO 24 HOURS

## 2021-09-30 PROCEDURE — 97530 THERAPEUTIC ACTIVITIES: CPT

## 2021-09-30 PROCEDURE — 83735 ASSAY OF MAGNESIUM: CPT | Performed by: HOSPITALIST

## 2021-09-30 RX ORDER — DOXYCYCLINE 100 MG/1
100 CAPSULE ORAL EVERY 12 HOURS
Status: DISCONTINUED | OUTPATIENT
Start: 2021-09-30 | End: 2021-10-01 | Stop reason: HOSPADM

## 2021-09-30 RX ORDER — LORAZEPAM 2 MG/ML
0.5 INJECTION INTRAMUSCULAR ONCE
Status: DISCONTINUED | OUTPATIENT
Start: 2021-09-30 | End: 2021-10-01 | Stop reason: HOSPADM

## 2021-09-30 RX ORDER — FUROSEMIDE 20 MG/1
20 TABLET ORAL DAILY
Status: DISCONTINUED | OUTPATIENT
Start: 2021-10-01 | End: 2021-10-01

## 2021-09-30 RX ORDER — POLYETHYLENE GLYCOL 3350 17 G/17G
17 POWDER, FOR SOLUTION ORAL 2 TIMES DAILY
Status: DISCONTINUED | OUTPATIENT
Start: 2021-09-30 | End: 2021-10-01 | Stop reason: HOSPADM

## 2021-09-30 RX ORDER — DOCUSATE SODIUM 100 MG/1
100 CAPSULE, LIQUID FILLED ORAL 2 TIMES DAILY
Status: DISCONTINUED | OUTPATIENT
Start: 2021-09-30 | End: 2021-10-01 | Stop reason: HOSPADM

## 2021-09-30 RX ADMIN — ONDANSETRON 4 MG: 2 INJECTION INTRAMUSCULAR; INTRAVENOUS at 02:09

## 2021-09-30 RX ADMIN — ACETAMINOPHEN 650 MG: 325 TABLET, FILM COATED ORAL at 01:09

## 2021-09-30 RX ADMIN — TIOTROPIUM BROMIDE INHALATION SPRAY 2 PUFF: 3.12 SPRAY, METERED RESPIRATORY (INHALATION) at 07:09

## 2021-09-30 RX ADMIN — METOPROLOL TARTRATE 25 MG: 25 TABLET, FILM COATED ORAL at 11:09

## 2021-09-30 RX ADMIN — DOXYCYCLINE HYCLATE 100 MG: 100 CAPSULE ORAL at 11:09

## 2021-09-30 RX ADMIN — APIXABAN 2.5 MG: 2.5 TABLET, FILM COATED ORAL at 08:09

## 2021-09-30 RX ADMIN — POLYETHYLENE GLYCOL 3350 17 G: 17 POWDER, FOR SOLUTION ORAL at 08:09

## 2021-09-30 RX ADMIN — ALPRAZOLAM 0.25 MG: 0.25 TABLET ORAL at 08:09

## 2021-09-30 RX ADMIN — METOPROLOL TARTRATE 25 MG: 25 TABLET, FILM COATED ORAL at 08:09

## 2021-09-30 RX ADMIN — ACETAMINOPHEN 650 MG: 325 TABLET, FILM COATED ORAL at 08:09

## 2021-09-30 RX ADMIN — POLYETHYLENE GLYCOL 3350 17 G: 17 POWDER, FOR SOLUTION ORAL at 02:09

## 2021-09-30 RX ADMIN — DOXYCYCLINE HYCLATE 100 MG: 100 CAPSULE ORAL at 08:09

## 2021-09-30 RX ADMIN — CYANOCOBALAMIN TAB 1000 MCG 1000 MCG: 1000 TAB at 08:09

## 2021-09-30 RX ADMIN — DOCUSATE SODIUM 100 MG: 100 CAPSULE, LIQUID FILLED ORAL at 08:09

## 2021-09-30 RX ADMIN — SERTRALINE HYDROCHLORIDE 25 MG: 25 TABLET ORAL at 08:09

## 2021-09-30 RX ADMIN — DOCUSATE SODIUM 100 MG: 100 CAPSULE, LIQUID FILLED ORAL at 02:09

## 2021-09-30 RX ADMIN — FLUTICASONE FUROATE AND VILANTEROL TRIFENATATE 1 PUFF: 100; 25 POWDER RESPIRATORY (INHALATION) at 07:09

## 2021-10-01 VITALS
DIASTOLIC BLOOD PRESSURE: 60 MMHG | BODY MASS INDEX: 22.38 KG/M2 | TEMPERATURE: 97 F | HEIGHT: 63 IN | SYSTOLIC BLOOD PRESSURE: 120 MMHG | WEIGHT: 126.31 LBS | RESPIRATION RATE: 18 BRPM | HEART RATE: 53 BPM | OXYGEN SATURATION: 100 %

## 2021-10-01 PROBLEM — R06.00 DYSPNEA: Status: RESOLVED | Noted: 2021-09-28 | Resolved: 2021-10-01

## 2021-10-01 PROBLEM — E87.6 HYPOKALEMIA: Status: RESOLVED | Noted: 2021-09-08 | Resolved: 2021-10-01

## 2021-10-01 PROBLEM — I50.32 CHRONIC DIASTOLIC HEART FAILURE: Chronic | Status: ACTIVE | Noted: 2021-09-08

## 2021-10-01 PROBLEM — N18.31 STAGE 3A CHRONIC KIDNEY DISEASE: Chronic | Status: ACTIVE | Noted: 2021-09-28

## 2021-10-01 LAB
ANION GAP SERPL CALC-SCNC: 11 MMOL/L (ref 8–16)
BASOPHILS # BLD AUTO: 0.01 K/UL (ref 0–0.2)
BASOPHILS NFR BLD: 0.1 % (ref 0–1.9)
BUN SERPL-MCNC: 49 MG/DL (ref 8–23)
CALCIUM SERPL-MCNC: 8.4 MG/DL (ref 8.7–10.5)
CHLORIDE SERPL-SCNC: 99 MMOL/L (ref 95–110)
CO2 SERPL-SCNC: 32 MMOL/L (ref 23–29)
CREAT SERPL-MCNC: 1.7 MG/DL (ref 0.5–1.4)
DIFFERENTIAL METHOD: ABNORMAL
EOSINOPHIL # BLD AUTO: 0.2 K/UL (ref 0–0.5)
EOSINOPHIL NFR BLD: 2.4 % (ref 0–8)
ERYTHROCYTE [DISTWIDTH] IN BLOOD BY AUTOMATED COUNT: 20.1 % (ref 11.5–14.5)
EST. GFR  (AFRICAN AMERICAN): 33.5 ML/MIN/1.73 M^2
EST. GFR  (NON AFRICAN AMERICAN): 29.1 ML/MIN/1.73 M^2
GLUCOSE SERPL-MCNC: 132 MG/DL (ref 70–110)
HCT VFR BLD AUTO: 32.1 % (ref 37–48.5)
HGB BLD-MCNC: 9.4 G/DL (ref 12–16)
IMM GRANULOCYTES # BLD AUTO: 0.06 K/UL (ref 0–0.04)
IMM GRANULOCYTES NFR BLD AUTO: 0.8 % (ref 0–0.5)
LYMPHOCYTES # BLD AUTO: 1 K/UL (ref 1–4.8)
LYMPHOCYTES NFR BLD: 14.2 % (ref 18–48)
MAGNESIUM SERPL-MCNC: 1.9 MG/DL (ref 1.6–2.6)
MCH RBC QN AUTO: 29.1 PG (ref 27–31)
MCHC RBC AUTO-ENTMCNC: 29.3 G/DL (ref 32–36)
MCV RBC AUTO: 99 FL (ref 82–98)
MONOCYTES # BLD AUTO: 0.2 K/UL (ref 0.3–1)
MONOCYTES NFR BLD: 2.8 % (ref 4–15)
NEUTROPHILS # BLD AUTO: 5.6 K/UL (ref 1.8–7.7)
NEUTROPHILS NFR BLD: 79.7 % (ref 38–73)
NRBC BLD-RTO: 0 /100 WBC
PHOSPHATE SERPL-MCNC: 3.2 MG/DL (ref 2.7–4.5)
PLATELET # BLD AUTO: 243 K/UL (ref 150–450)
PMV BLD AUTO: 10 FL (ref 9.2–12.9)
POTASSIUM SERPL-SCNC: 4.5 MMOL/L (ref 3.5–5.1)
RBC # BLD AUTO: 3.23 M/UL (ref 4–5.4)
SODIUM SERPL-SCNC: 142 MMOL/L (ref 136–145)
WBC # BLD AUTO: 7.09 K/UL (ref 3.9–12.7)

## 2021-10-01 PROCEDURE — 97116 GAIT TRAINING THERAPY: CPT

## 2021-10-01 PROCEDURE — 85025 COMPLETE CBC W/AUTO DIFF WBC: CPT | Performed by: HOSPITALIST

## 2021-10-01 PROCEDURE — 94640 AIRWAY INHALATION TREATMENT: CPT

## 2021-10-01 PROCEDURE — 25000003 PHARM REV CODE 250: Performed by: FAMILY MEDICINE

## 2021-10-01 PROCEDURE — 84100 ASSAY OF PHOSPHORUS: CPT | Performed by: HOSPITALIST

## 2021-10-01 PROCEDURE — 36415 COLL VENOUS BLD VENIPUNCTURE: CPT | Performed by: HOSPITALIST

## 2021-10-01 PROCEDURE — 97530 THERAPEUTIC ACTIVITIES: CPT

## 2021-10-01 PROCEDURE — 83735 ASSAY OF MAGNESIUM: CPT | Performed by: HOSPITALIST

## 2021-10-01 PROCEDURE — 94760 N-INVAS EAR/PLS OXIMETRY 1: CPT

## 2021-10-01 PROCEDURE — 99900035 HC TECH TIME PER 15 MIN (STAT)

## 2021-10-01 PROCEDURE — 80048 BASIC METABOLIC PNL TOTAL CA: CPT | Performed by: FAMILY MEDICINE

## 2021-10-01 PROCEDURE — 25000003 PHARM REV CODE 250: Performed by: HOSPITALIST

## 2021-10-01 RX ORDER — MIRTAZAPINE 15 MG/1
15 TABLET, FILM COATED ORAL NIGHTLY
Qty: 30 TABLET | Refills: 0 | Status: SHIPPED | OUTPATIENT
Start: 2021-10-01 | End: 2022-10-01

## 2021-10-01 RX ORDER — MIDODRINE HYDROCHLORIDE 2.5 MG/1
2.5 TABLET ORAL 3 TIMES DAILY
Qty: 90 TABLET | Refills: 0 | Status: SHIPPED | OUTPATIENT
Start: 2021-10-01 | End: 2021-12-01

## 2021-10-01 RX ORDER — DOXYCYCLINE 100 MG/1
100 CAPSULE ORAL EVERY 12 HOURS
Qty: 10 CAPSULE | Refills: 0 | Status: SHIPPED | OUTPATIENT
Start: 2021-10-01 | End: 2021-10-06

## 2021-10-01 RX ORDER — MIDODRINE HYDROCHLORIDE 2.5 MG/1
2.5 TABLET ORAL 3 TIMES DAILY
Status: DISCONTINUED | OUTPATIENT
Start: 2021-10-01 | End: 2021-10-01 | Stop reason: HOSPADM

## 2021-10-01 RX ORDER — MIRTAZAPINE 15 MG/1
15 TABLET, FILM COATED ORAL NIGHTLY
Status: DISCONTINUED | OUTPATIENT
Start: 2021-10-01 | End: 2021-10-01 | Stop reason: HOSPADM

## 2021-10-01 RX ADMIN — MIDODRINE HYDROCHLORIDE 2.5 MG: 2.5 TABLET ORAL at 09:10

## 2021-10-01 RX ADMIN — METOPROLOL TARTRATE 25 MG: 25 TABLET, FILM COATED ORAL at 09:10

## 2021-10-01 RX ADMIN — DOXYCYCLINE HYCLATE 100 MG: 100 CAPSULE ORAL at 09:10

## 2021-10-01 RX ADMIN — TIOTROPIUM BROMIDE INHALATION SPRAY 2 PUFF: 3.12 SPRAY, METERED RESPIRATORY (INHALATION) at 08:10

## 2021-10-01 RX ADMIN — CYANOCOBALAMIN TAB 1000 MCG 1000 MCG: 1000 TAB at 09:10

## 2021-10-01 RX ADMIN — MIDODRINE HYDROCHLORIDE 2.5 MG: 2.5 TABLET ORAL at 11:10

## 2021-10-01 RX ADMIN — FLUTICASONE FUROATE AND VILANTEROL TRIFENATATE 1 PUFF: 100; 25 POWDER RESPIRATORY (INHALATION) at 08:10

## 2021-10-01 RX ADMIN — ACETAMINOPHEN 650 MG: 325 TABLET, FILM COATED ORAL at 04:10

## 2021-10-01 RX ADMIN — APIXABAN 2.5 MG: 2.5 TABLET, FILM COATED ORAL at 09:10

## 2021-10-01 RX ADMIN — ACETAMINOPHEN 650 MG: 325 TABLET, FILM COATED ORAL at 01:10

## 2021-10-01 RX ADMIN — DOCUSATE SODIUM 100 MG: 100 CAPSULE, LIQUID FILLED ORAL at 09:10

## 2021-10-01 RX ADMIN — SERTRALINE HYDROCHLORIDE 25 MG: 25 TABLET ORAL at 09:10

## 2021-10-04 ENCOUNTER — TELEPHONE (OUTPATIENT)
Dept: FAMILY MEDICINE | Facility: CLINIC | Age: 76
End: 2021-10-04

## 2021-10-04 ENCOUNTER — PATIENT OUTREACH (OUTPATIENT)
Dept: FAMILY MEDICINE | Facility: CLINIC | Age: 76
End: 2021-10-04

## 2021-10-05 ENCOUNTER — OFFICE VISIT (OUTPATIENT)
Dept: FAMILY MEDICINE | Facility: CLINIC | Age: 76
End: 2021-10-05
Payer: MEDICARE

## 2021-10-05 VITALS
DIASTOLIC BLOOD PRESSURE: 76 MMHG | BODY MASS INDEX: 20.73 KG/M2 | WEIGHT: 117 LBS | HEIGHT: 63 IN | HEART RATE: 76 BPM | SYSTOLIC BLOOD PRESSURE: 120 MMHG

## 2021-10-05 DIAGNOSIS — K21.9 GASTROESOPHAGEAL REFLUX DISEASE WITHOUT ESOPHAGITIS: ICD-10-CM

## 2021-10-05 DIAGNOSIS — Z74.09 IMPAIRED MOBILITY AND ADLS: ICD-10-CM

## 2021-10-05 DIAGNOSIS — I51.89 DIASTOLIC DYSFUNCTION: ICD-10-CM

## 2021-10-05 DIAGNOSIS — Z78.9 IMPAIRED MOBILITY AND ADLS: ICD-10-CM

## 2021-10-05 DIAGNOSIS — J43.9 PULMONARY EMPHYSEMA, UNSPECIFIED EMPHYSEMA TYPE: ICD-10-CM

## 2021-10-05 DIAGNOSIS — R60.0 LOCALIZED EDEMA: ICD-10-CM

## 2021-10-05 DIAGNOSIS — R53.81 DECLINING FUNCTIONAL STATUS: ICD-10-CM

## 2021-10-05 DIAGNOSIS — J96.11 CHRONIC RESPIRATORY FAILURE WITH HYPOXIA: ICD-10-CM

## 2021-10-05 DIAGNOSIS — R41.89 COGNITIVE IMPAIRMENT: ICD-10-CM

## 2021-10-05 DIAGNOSIS — Z09 HOSPITAL DISCHARGE FOLLOW-UP: Primary | ICD-10-CM

## 2021-10-05 PROCEDURE — 99495 TRANSJ CARE MGMT MOD F2F 14D: CPT | Mod: S$GLB,,, | Performed by: NURSE PRACTITIONER

## 2021-10-05 PROCEDURE — 99495 TCM SERVICES (MODERATE COMPLEXITY): ICD-10-PCS | Mod: S$GLB,,, | Performed by: NURSE PRACTITIONER

## 2021-10-05 RX ORDER — FUROSEMIDE 20 MG/1
20 TABLET ORAL DAILY
Qty: 30 TABLET | Refills: 1 | Status: ON HOLD | OUTPATIENT
Start: 2021-10-05 | End: 2021-10-13 | Stop reason: HOSPADM

## 2021-10-05 RX ORDER — OMEPRAZOLE 20 MG/1
20 CAPSULE, DELAYED RELEASE ORAL DAILY
Qty: 30 CAPSULE | Refills: 11 | Status: ON HOLD | OUTPATIENT
Start: 2021-10-05 | End: 2021-10-24 | Stop reason: HOSPADM

## 2021-10-05 RX ORDER — POTASSIUM CHLORIDE 750 MG/1
10 CAPSULE, EXTENDED RELEASE ORAL ONCE
Qty: 1 CAPSULE | Refills: 0 | Status: SHIPPED | OUTPATIENT
Start: 2021-10-05 | End: 2021-10-05

## 2021-10-06 ENCOUNTER — TELEPHONE (OUTPATIENT)
Dept: CARDIOLOGY | Facility: CLINIC | Age: 76
End: 2021-10-06

## 2021-10-06 DIAGNOSIS — M79.605 PAIN IN BOTH LOWER EXTREMITIES: ICD-10-CM

## 2021-10-06 DIAGNOSIS — M79.604 PAIN IN BOTH LOWER EXTREMITIES: ICD-10-CM

## 2021-10-06 DIAGNOSIS — R60.9 SWELLING: Primary | ICD-10-CM

## 2021-10-08 ENCOUNTER — DOCUMENT SCAN (OUTPATIENT)
Dept: HOME HEALTH SERVICES | Facility: HOSPITAL | Age: 76
End: 2021-10-08

## 2021-10-08 ENCOUNTER — LAB VISIT (OUTPATIENT)
Dept: LAB | Facility: HOSPITAL | Age: 76
End: 2021-10-08
Attending: NURSE PRACTITIONER
Payer: MEDICARE

## 2021-10-08 DIAGNOSIS — I50.33 ACUTE ON CHRONIC DIASTOLIC HEART FAILURE: Primary | ICD-10-CM

## 2021-10-08 PROCEDURE — 87186 SC STD MICRODIL/AGAR DIL: CPT | Performed by: NURSE PRACTITIONER

## 2021-10-08 PROCEDURE — 87088 URINE BACTERIA CULTURE: CPT | Performed by: NURSE PRACTITIONER

## 2021-10-08 PROCEDURE — 87077 CULTURE AEROBIC IDENTIFY: CPT | Performed by: NURSE PRACTITIONER

## 2021-10-08 PROCEDURE — 87086 URINE CULTURE/COLONY COUNT: CPT | Performed by: NURSE PRACTITIONER

## 2021-10-11 ENCOUNTER — TELEPHONE (OUTPATIENT)
Dept: FAMILY MEDICINE | Facility: CLINIC | Age: 76
End: 2021-10-11

## 2021-10-11 DIAGNOSIS — N30.00 ACUTE CYSTITIS WITHOUT HEMATURIA: Primary | ICD-10-CM

## 2021-10-11 LAB — BACTERIA UR CULT: ABNORMAL

## 2021-10-11 RX ORDER — CIPROFLOXACIN 500 MG/1
500 TABLET ORAL EVERY 12 HOURS
Qty: 14 TABLET | Refills: 0 | Status: SHIPPED | OUTPATIENT
Start: 2021-10-11 | End: 2021-10-18

## 2021-10-12 ENCOUNTER — HOSPITAL ENCOUNTER (INPATIENT)
Facility: HOSPITAL | Age: 76
LOS: 3 days | Discharge: NURSING, REHAB OR LONG-TERM CARE | DRG: 640 | End: 2021-10-15
Attending: EMERGENCY MEDICINE | Admitting: INTERNAL MEDICINE
Payer: MEDICARE

## 2021-10-12 DIAGNOSIS — I50.9 CONGESTIVE HEART FAILURE, UNSPECIFIED HF CHRONICITY, UNSPECIFIED HEART FAILURE TYPE: Primary | ICD-10-CM

## 2021-10-12 DIAGNOSIS — E87.6 ACUTE HYPOKALEMIA: ICD-10-CM

## 2021-10-12 DIAGNOSIS — R06.00 DYSPNEA: ICD-10-CM

## 2021-10-12 DIAGNOSIS — N30.00 ACUTE CYSTITIS WITHOUT HEMATURIA: ICD-10-CM

## 2021-10-12 PROBLEM — I50.33 ACUTE ON CHRONIC DIASTOLIC (CONGESTIVE) HEART FAILURE: Status: ACTIVE | Noted: 2021-10-12

## 2021-10-12 PROBLEM — F41.9 ANXIETY: Status: ACTIVE | Noted: 2021-10-12

## 2021-10-12 PROBLEM — I48.91 ATRIAL FIBRILLATION WITH RVR: Status: ACTIVE | Noted: 2021-10-12

## 2021-10-12 PROBLEM — N39.0 UTI (URINARY TRACT INFECTION): Status: ACTIVE | Noted: 2021-10-12

## 2021-10-12 PROBLEM — I48.20 CHRONIC ATRIAL FIBRILLATION: Status: ACTIVE | Noted: 2021-10-12

## 2021-10-12 PROBLEM — Z78.9 FREQUENT HOSPITAL ADMISSIONS: Status: ACTIVE | Noted: 2021-10-12

## 2021-10-12 LAB
ALBUMIN SERPL BCP-MCNC: 2.9 G/DL (ref 3.5–5.2)
ALP SERPL-CCNC: 60 U/L (ref 55–135)
ALT SERPL W/O P-5'-P-CCNC: 24 U/L (ref 10–44)
ANION GAP SERPL CALC-SCNC: 10 MMOL/L (ref 8–16)
ANISOCYTOSIS BLD QL SMEAR: ABNORMAL
AST SERPL-CCNC: 25 U/L (ref 10–40)
BACTERIA #/AREA URNS HPF: NEGATIVE /HPF
BASOPHILS # BLD AUTO: 0.03 K/UL (ref 0–0.2)
BASOPHILS NFR BLD: 0.4 % (ref 0–1.9)
BILIRUB SERPL-MCNC: 0.8 MG/DL (ref 0.1–1)
BILIRUB UR QL STRIP: NEGATIVE
BNP SERPL-MCNC: 2580 PG/ML (ref 0–99)
BUN SERPL-MCNC: 32 MG/DL (ref 8–23)
CALCIUM SERPL-MCNC: 8.3 MG/DL (ref 8.7–10.5)
CHLORIDE SERPL-SCNC: 101 MMOL/L (ref 95–110)
CLARITY UR: CLEAR
CO2 SERPL-SCNC: 34 MMOL/L (ref 23–29)
COLOR UR: YELLOW
CREAT SERPL-MCNC: 1.5 MG/DL (ref 0.5–1.4)
DIFFERENTIAL METHOD: ABNORMAL
EOSINOPHIL # BLD AUTO: 0.1 K/UL (ref 0–0.5)
EOSINOPHIL NFR BLD: 1.9 % (ref 0–8)
ERYTHROCYTE [DISTWIDTH] IN BLOOD BY AUTOMATED COUNT: 22.5 % (ref 11.5–14.5)
EST. GFR  (AFRICAN AMERICAN): 39 ML/MIN/1.73 M^2
EST. GFR  (NON AFRICAN AMERICAN): 33.8 ML/MIN/1.73 M^2
GLUCOSE SERPL-MCNC: 95 MG/DL (ref 70–110)
GLUCOSE UR QL STRIP: NEGATIVE
HCT VFR BLD AUTO: 30.8 % (ref 37–48.5)
HGB BLD-MCNC: 9.4 G/DL (ref 12–16)
HGB UR QL STRIP: ABNORMAL
HYALINE CASTS #/AREA URNS LPF: 12 /LPF
IMM GRANULOCYTES # BLD AUTO: 0.06 K/UL (ref 0–0.04)
IMM GRANULOCYTES NFR BLD AUTO: 0.8 % (ref 0–0.5)
INR PPP: 1.3
KETONES UR QL STRIP: NEGATIVE
LEUKOCYTE ESTERASE UR QL STRIP: ABNORMAL
LYMPHOCYTES # BLD AUTO: 1 K/UL (ref 1–4.8)
LYMPHOCYTES NFR BLD: 14 % (ref 18–48)
MAGNESIUM SERPL-MCNC: 1.9 MG/DL (ref 1.6–2.6)
MCH RBC QN AUTO: 30.4 PG (ref 27–31)
MCHC RBC AUTO-ENTMCNC: 30.5 G/DL (ref 32–36)
MCV RBC AUTO: 100 FL (ref 82–98)
MICROSCOPIC COMMENT: ABNORMAL
MONOCYTES # BLD AUTO: 0.3 K/UL (ref 0.3–1)
MONOCYTES NFR BLD: 4.6 % (ref 4–15)
NEUTROPHILS # BLD AUTO: 5.8 K/UL (ref 1.8–7.7)
NEUTROPHILS NFR BLD: 78.3 % (ref 38–73)
NITRITE UR QL STRIP: NEGATIVE
NRBC BLD-RTO: 0 /100 WBC
OVALOCYTES BLD QL SMEAR: ABNORMAL
PH UR STRIP: 8 [PH] (ref 5–8)
PLATELET # BLD AUTO: 251 K/UL (ref 150–450)
PMV BLD AUTO: 10 FL (ref 9.2–12.9)
POIKILOCYTOSIS BLD QL SMEAR: SLIGHT
POTASSIUM SERPL-SCNC: 2.9 MMOL/L (ref 3.5–5.1)
PROT SERPL-MCNC: 5.7 G/DL (ref 6–8.4)
PROT UR QL STRIP: NEGATIVE
PROTHROMBIN TIME: 15.1 SEC (ref 11.4–13.7)
RBC # BLD AUTO: 3.09 M/UL (ref 4–5.4)
RBC #/AREA URNS HPF: 5 /HPF (ref 0–4)
SARS-COV-2 RDRP RESP QL NAA+PROBE: NEGATIVE
SODIUM SERPL-SCNC: 145 MMOL/L (ref 136–145)
SP GR UR STRIP: 1.01 (ref 1–1.03)
SQUAMOUS #/AREA URNS HPF: 2 /HPF
TROPONIN I SERPL DL<=0.01 NG/ML-MCNC: 0.04 NG/ML
URN SPEC COLLECT METH UR: ABNORMAL
UROBILINOGEN UR STRIP-ACNC: NEGATIVE EU/DL
WBC # BLD AUTO: 7.38 K/UL (ref 3.9–12.7)
WBC #/AREA URNS HPF: 11 /HPF (ref 0–5)

## 2021-10-12 PROCEDURE — 96375 TX/PRO/DX INJ NEW DRUG ADDON: CPT

## 2021-10-12 PROCEDURE — U0002 COVID-19 LAB TEST NON-CDC: HCPCS | Performed by: EMERGENCY MEDICINE

## 2021-10-12 PROCEDURE — 85610 PROTHROMBIN TIME: CPT | Performed by: EMERGENCY MEDICINE

## 2021-10-12 PROCEDURE — 84484 ASSAY OF TROPONIN QUANT: CPT | Performed by: EMERGENCY MEDICINE

## 2021-10-12 PROCEDURE — 63600175 PHARM REV CODE 636 W HCPCS: Performed by: EMERGENCY MEDICINE

## 2021-10-12 PROCEDURE — 25000003 PHARM REV CODE 250: Performed by: INTERNAL MEDICINE

## 2021-10-12 PROCEDURE — 85025 COMPLETE CBC W/AUTO DIFF WBC: CPT | Performed by: EMERGENCY MEDICINE

## 2021-10-12 PROCEDURE — 83880 ASSAY OF NATRIURETIC PEPTIDE: CPT | Performed by: EMERGENCY MEDICINE

## 2021-10-12 PROCEDURE — 83735 ASSAY OF MAGNESIUM: CPT | Performed by: EMERGENCY MEDICINE

## 2021-10-12 PROCEDURE — 99285 EMERGENCY DEPT VISIT HI MDM: CPT | Mod: 25

## 2021-10-12 PROCEDURE — 81001 URINALYSIS AUTO W/SCOPE: CPT | Performed by: EMERGENCY MEDICINE

## 2021-10-12 PROCEDURE — 80053 COMPREHEN METABOLIC PANEL: CPT | Performed by: EMERGENCY MEDICINE

## 2021-10-12 PROCEDURE — 93005 ELECTROCARDIOGRAM TRACING: CPT | Performed by: INTERNAL MEDICINE

## 2021-10-12 PROCEDURE — 25000003 PHARM REV CODE 250: Performed by: EMERGENCY MEDICINE

## 2021-10-12 PROCEDURE — 93010 EKG 12-LEAD: ICD-10-PCS | Mod: ,,, | Performed by: INTERNAL MEDICINE

## 2021-10-12 PROCEDURE — 96374 THER/PROPH/DIAG INJ IV PUSH: CPT

## 2021-10-12 PROCEDURE — 93010 ELECTROCARDIOGRAM REPORT: CPT | Mod: ,,, | Performed by: INTERNAL MEDICINE

## 2021-10-12 PROCEDURE — 87086 URINE CULTURE/COLONY COUNT: CPT | Performed by: EMERGENCY MEDICINE

## 2021-10-12 PROCEDURE — 21000000 HC CCU ICU ROOM CHARGE

## 2021-10-12 RX ORDER — ACETAMINOPHEN 325 MG/1
650 TABLET ORAL EVERY 4 HOURS PRN
Status: DISCONTINUED | OUTPATIENT
Start: 2021-10-12 | End: 2021-10-15 | Stop reason: HOSPADM

## 2021-10-12 RX ORDER — NALOXONE HCL 0.4 MG/ML
0.02 VIAL (ML) INJECTION
Status: DISCONTINUED | OUTPATIENT
Start: 2021-10-12 | End: 2021-10-15 | Stop reason: HOSPADM

## 2021-10-12 RX ORDER — POTASSIUM CHLORIDE 7.45 MG/ML
20 INJECTION INTRAVENOUS
Status: DISCONTINUED | OUTPATIENT
Start: 2021-10-12 | End: 2021-10-15 | Stop reason: HOSPADM

## 2021-10-12 RX ORDER — CIPROFLOXACIN 500 MG/1
500 TABLET ORAL EVERY 12 HOURS
Status: DISCONTINUED | OUTPATIENT
Start: 2021-10-12 | End: 2021-10-12 | Stop reason: CLARIF

## 2021-10-12 RX ORDER — LEVALBUTEROL 1.25 MG/.5ML
1.25 SOLUTION, CONCENTRATE RESPIRATORY (INHALATION)
Status: DISCONTINUED | OUTPATIENT
Start: 2021-10-12 | End: 2021-10-13

## 2021-10-12 RX ORDER — ALPRAZOLAM 0.5 MG/1
0.5 TABLET ORAL ONCE
Status: COMPLETED | OUTPATIENT
Start: 2021-10-12 | End: 2021-10-12

## 2021-10-12 RX ORDER — TRAZODONE HYDROCHLORIDE 50 MG/1
50 TABLET ORAL NIGHTLY
Status: DISCONTINUED | OUTPATIENT
Start: 2021-10-12 | End: 2021-10-15 | Stop reason: HOSPADM

## 2021-10-12 RX ORDER — FUROSEMIDE 10 MG/ML
40 INJECTION INTRAMUSCULAR; INTRAVENOUS
Status: DISCONTINUED | OUTPATIENT
Start: 2021-10-13 | End: 2021-10-13

## 2021-10-12 RX ORDER — FUROSEMIDE 10 MG/ML
40 INJECTION INTRAMUSCULAR; INTRAVENOUS
Status: COMPLETED | OUTPATIENT
Start: 2021-10-12 | End: 2021-10-12

## 2021-10-12 RX ORDER — POTASSIUM CHLORIDE 20 MEQ/1
40 TABLET, EXTENDED RELEASE ORAL
Status: DISCONTINUED | OUTPATIENT
Start: 2021-10-12 | End: 2021-10-15 | Stop reason: HOSPADM

## 2021-10-12 RX ORDER — POTASSIUM CHLORIDE 20 MEQ/1
20 TABLET, EXTENDED RELEASE ORAL
Status: DISCONTINUED | OUTPATIENT
Start: 2021-10-12 | End: 2021-10-15 | Stop reason: HOSPADM

## 2021-10-12 RX ORDER — FLUTICASONE FUROATE AND VILANTEROL 200; 25 UG/1; UG/1
1 POWDER RESPIRATORY (INHALATION) DAILY
Status: DISCONTINUED | OUTPATIENT
Start: 2021-10-13 | End: 2021-10-15 | Stop reason: HOSPADM

## 2021-10-12 RX ORDER — LEVOFLOXACIN 500 MG/1
500 TABLET, FILM COATED ORAL ONCE
Status: COMPLETED | OUTPATIENT
Start: 2021-10-12 | End: 2021-10-12

## 2021-10-12 RX ORDER — MIDODRINE HYDROCHLORIDE 2.5 MG/1
2.5 TABLET ORAL
Status: DISCONTINUED | OUTPATIENT
Start: 2021-10-12 | End: 2021-10-12

## 2021-10-12 RX ORDER — METOPROLOL TARTRATE 25 MG/1
25 TABLET, FILM COATED ORAL 2 TIMES DAILY
Status: DISCONTINUED | OUTPATIENT
Start: 2021-10-12 | End: 2021-10-12

## 2021-10-12 RX ORDER — PANTOPRAZOLE SODIUM 40 MG/1
40 TABLET, DELAYED RELEASE ORAL
Refills: 11 | Status: DISCONTINUED | OUTPATIENT
Start: 2021-10-13 | End: 2021-10-15 | Stop reason: HOSPADM

## 2021-10-12 RX ORDER — MAGNESIUM SULFATE HEPTAHYDRATE 40 MG/ML
4 INJECTION, SOLUTION INTRAVENOUS
Status: DISCONTINUED | OUTPATIENT
Start: 2021-10-12 | End: 2021-10-15 | Stop reason: HOSPADM

## 2021-10-12 RX ORDER — MAGNESIUM SULFATE 1 G/100ML
1 INJECTION INTRAVENOUS
Status: DISCONTINUED | OUTPATIENT
Start: 2021-10-12 | End: 2021-10-15 | Stop reason: HOSPADM

## 2021-10-12 RX ORDER — METOPROLOL TARTRATE 25 MG/1
25 TABLET, FILM COATED ORAL 2 TIMES DAILY
Status: DISCONTINUED | OUTPATIENT
Start: 2021-10-12 | End: 2021-10-15 | Stop reason: HOSPADM

## 2021-10-12 RX ORDER — MAGNESIUM SULFATE HEPTAHYDRATE 40 MG/ML
2 INJECTION, SOLUTION INTRAVENOUS
Status: DISCONTINUED | OUTPATIENT
Start: 2021-10-12 | End: 2021-10-15 | Stop reason: HOSPADM

## 2021-10-12 RX ORDER — DILTIAZEM HYDROCHLORIDE 30 MG/1
30 TABLET, FILM COATED ORAL EVERY 6 HOURS
Status: DISCONTINUED | OUTPATIENT
Start: 2021-10-12 | End: 2021-10-15 | Stop reason: HOSPADM

## 2021-10-12 RX ORDER — LEVOFLOXACIN 250 MG/1
250 TABLET ORAL DAILY
Status: DISCONTINUED | OUTPATIENT
Start: 2021-10-13 | End: 2021-10-15 | Stop reason: HOSPADM

## 2021-10-12 RX ORDER — POTASSIUM CHLORIDE 7.45 MG/ML
40 INJECTION INTRAVENOUS
Status: DISCONTINUED | OUTPATIENT
Start: 2021-10-12 | End: 2021-10-15 | Stop reason: HOSPADM

## 2021-10-12 RX ORDER — LANOLIN ALCOHOL/MO/W.PET/CERES
800 CREAM (GRAM) TOPICAL
Status: DISCONTINUED | OUTPATIENT
Start: 2021-10-12 | End: 2021-10-15 | Stop reason: HOSPADM

## 2021-10-12 RX ORDER — ALPRAZOLAM 0.5 MG/1
0.5 TABLET ORAL 2 TIMES DAILY PRN
Status: DISCONTINUED | OUTPATIENT
Start: 2021-10-12 | End: 2021-10-15 | Stop reason: HOSPADM

## 2021-10-12 RX ADMIN — APIXABAN 2.5 MG: 2.5 TABLET, FILM COATED ORAL at 09:10

## 2021-10-12 RX ADMIN — TRAZODONE HYDROCHLORIDE 50 MG: 50 TABLET ORAL at 09:10

## 2021-10-12 RX ADMIN — FUROSEMIDE 40 MG: 10 INJECTION, SOLUTION INTRAMUSCULAR; INTRAVENOUS at 12:10

## 2021-10-12 RX ADMIN — ACETAMINOPHEN 650 MG: 325 TABLET, FILM COATED ORAL at 10:10

## 2021-10-12 RX ADMIN — POTASSIUM BICARBONATE 50 MEQ: 977.5 TABLET, EFFERVESCENT ORAL at 12:10

## 2021-10-12 RX ADMIN — DILTIAZEM HYDROCHLORIDE 30 MG: 30 TABLET, FILM COATED ORAL at 11:10

## 2021-10-12 RX ADMIN — LEVOFLOXACIN 500 MG: 500 TABLET, FILM COATED ORAL at 05:10

## 2021-10-12 RX ADMIN — METOPROLOL TARTRATE 25 MG: 25 TABLET, FILM COATED ORAL at 05:10

## 2021-10-12 RX ADMIN — CEFTRIAXONE 1 G: 1 INJECTION, SOLUTION INTRAVENOUS at 01:10

## 2021-10-12 RX ADMIN — DILTIAZEM HYDROCHLORIDE 30 MG: 30 TABLET, FILM COATED ORAL at 05:10

## 2021-10-12 RX ADMIN — ALPRAZOLAM 0.5 MG: 0.5 TABLET ORAL at 05:10

## 2021-10-13 ENCOUNTER — DOCUMENT SCAN (OUTPATIENT)
Dept: HOME HEALTH SERVICES | Facility: HOSPITAL | Age: 76
End: 2021-10-13

## 2021-10-13 ENCOUNTER — TELEPHONE (OUTPATIENT)
Dept: FAMILY MEDICINE | Facility: CLINIC | Age: 76
End: 2021-10-13

## 2021-10-13 PROBLEM — E87.6 ACUTE HYPOKALEMIA: Status: RESOLVED | Noted: 2021-10-12 | Resolved: 2021-10-13

## 2021-10-13 PROBLEM — N39.0 UTI (URINARY TRACT INFECTION): Status: RESOLVED | Noted: 2021-10-12 | Resolved: 2021-10-13

## 2021-10-13 PROBLEM — R00.0 TACHYCARDIA, UNSPECIFIED: Status: RESOLVED | Noted: 2021-08-02 | Resolved: 2021-10-13

## 2021-10-13 PROBLEM — I50.33 ACUTE ON CHRONIC DIASTOLIC (CONGESTIVE) HEART FAILURE: Status: RESOLVED | Noted: 2021-10-12 | Resolved: 2021-10-13

## 2021-10-13 PROBLEM — I48.91 ATRIAL FIBRILLATION WITH RVR: Status: RESOLVED | Noted: 2021-10-12 | Resolved: 2021-10-13

## 2021-10-13 LAB
ALBUMIN SERPL BCP-MCNC: 3 G/DL (ref 3.5–5.2)
ALP SERPL-CCNC: 65 U/L (ref 55–135)
ALT SERPL W/O P-5'-P-CCNC: 24 U/L (ref 10–44)
ANION GAP SERPL CALC-SCNC: 12 MMOL/L (ref 8–16)
AST SERPL-CCNC: 21 U/L (ref 10–40)
BILIRUB SERPL-MCNC: 1.4 MG/DL (ref 0.1–1)
BUN SERPL-MCNC: 25 MG/DL (ref 8–23)
CALCIUM SERPL-MCNC: 8.2 MG/DL (ref 8.7–10.5)
CHLORIDE SERPL-SCNC: 95 MMOL/L (ref 95–110)
CO2 SERPL-SCNC: 37 MMOL/L (ref 23–29)
CORTIS SERPL-MCNC: 18.4 UG/DL
CREAT SERPL-MCNC: 1.5 MG/DL (ref 0.5–1.4)
ERYTHROCYTE [DISTWIDTH] IN BLOOD BY AUTOMATED COUNT: 22.2 % (ref 11.5–14.5)
EST. GFR  (AFRICAN AMERICAN): 39 ML/MIN/1.73 M^2
EST. GFR  (NON AFRICAN AMERICAN): 33.8 ML/MIN/1.73 M^2
GLUCOSE SERPL-MCNC: 94 MG/DL (ref 70–110)
HCT VFR BLD AUTO: 34.9 % (ref 37–48.5)
HGB BLD-MCNC: 10.5 G/DL (ref 12–16)
MAGNESIUM SERPL-MCNC: 1.9 MG/DL (ref 1.6–2.6)
MCH RBC QN AUTO: 29.6 PG (ref 27–31)
MCHC RBC AUTO-ENTMCNC: 30.1 G/DL (ref 32–36)
MCV RBC AUTO: 98 FL (ref 82–98)
PLATELET # BLD AUTO: 249 K/UL (ref 150–450)
PMV BLD AUTO: 9.4 FL (ref 9.2–12.9)
POTASSIUM SERPL-SCNC: 3 MMOL/L (ref 3.5–5.1)
PROT SERPL-MCNC: 6 G/DL (ref 6–8.4)
RBC # BLD AUTO: 3.55 M/UL (ref 4–5.4)
SODIUM SERPL-SCNC: 144 MMOL/L (ref 136–145)
WBC # BLD AUTO: 6.49 K/UL (ref 3.9–12.7)

## 2021-10-13 PROCEDURE — 99222 PR INITIAL HOSPITAL CARE,LEVL II: ICD-10-PCS | Mod: ,,, | Performed by: INTERNAL MEDICINE

## 2021-10-13 PROCEDURE — 85027 COMPLETE CBC AUTOMATED: CPT | Performed by: INTERNAL MEDICINE

## 2021-10-13 PROCEDURE — 99900031 HC PATIENT EDUCATION (STAT)

## 2021-10-13 PROCEDURE — 94761 N-INVAS EAR/PLS OXIMETRY MLT: CPT

## 2021-10-13 PROCEDURE — 27000221 HC OXYGEN, UP TO 24 HOURS

## 2021-10-13 PROCEDURE — 83735 ASSAY OF MAGNESIUM: CPT | Performed by: INTERNAL MEDICINE

## 2021-10-13 PROCEDURE — 25000003 PHARM REV CODE 250: Performed by: INTERNAL MEDICINE

## 2021-10-13 PROCEDURE — 25000242 PHARM REV CODE 250 ALT 637 W/ HCPCS: Performed by: INTERNAL MEDICINE

## 2021-10-13 PROCEDURE — 80053 COMPREHEN METABOLIC PANEL: CPT | Performed by: INTERNAL MEDICINE

## 2021-10-13 PROCEDURE — 63600175 PHARM REV CODE 636 W HCPCS: Performed by: INTERNAL MEDICINE

## 2021-10-13 PROCEDURE — 99900035 HC TECH TIME PER 15 MIN (STAT)

## 2021-10-13 PROCEDURE — 94640 AIRWAY INHALATION TREATMENT: CPT

## 2021-10-13 PROCEDURE — 21000000 HC CCU ICU ROOM CHARGE

## 2021-10-13 PROCEDURE — 82533 TOTAL CORTISOL: CPT | Performed by: INTERNAL MEDICINE

## 2021-10-13 PROCEDURE — 99222 1ST HOSP IP/OBS MODERATE 55: CPT | Mod: ,,, | Performed by: INTERNAL MEDICINE

## 2021-10-13 RX ORDER — FUROSEMIDE 40 MG/1
40 TABLET ORAL DAILY
Qty: 30 TABLET | Refills: 0 | Status: ON HOLD | OUTPATIENT
Start: 2021-10-13 | End: 2021-11-03 | Stop reason: SDUPTHER

## 2021-10-13 RX ORDER — FUROSEMIDE 20 MG/1
20 TABLET ORAL DAILY
Status: DISCONTINUED | OUTPATIENT
Start: 2021-10-14 | End: 2021-10-13

## 2021-10-13 RX ORDER — FUROSEMIDE 10 MG/ML
40 INJECTION INTRAMUSCULAR; INTRAVENOUS ONCE
Status: COMPLETED | OUTPATIENT
Start: 2021-10-13 | End: 2021-10-13

## 2021-10-13 RX ORDER — DILTIAZEM HYDROCHLORIDE 180 MG/1
180 CAPSULE, COATED, EXTENDED RELEASE ORAL DAILY
Qty: 30 CAPSULE | Refills: 0 | Status: SHIPPED | OUTPATIENT
Start: 2021-10-13 | End: 2021-11-12

## 2021-10-13 RX ORDER — FUROSEMIDE 10 MG/ML
40 INJECTION INTRAMUSCULAR; INTRAVENOUS
Status: DISCONTINUED | OUTPATIENT
Start: 2021-10-14 | End: 2021-10-13

## 2021-10-13 RX ORDER — POTASSIUM CHLORIDE 750 MG/1
10 TABLET, EXTENDED RELEASE ORAL DAILY
Qty: 14 TABLET | Refills: 0 | Status: ON HOLD | OUTPATIENT
Start: 2021-10-13 | End: 2022-01-05 | Stop reason: HOSPADM

## 2021-10-13 RX ORDER — FUROSEMIDE 10 MG/ML
40 INJECTION INTRAMUSCULAR; INTRAVENOUS
Status: DISCONTINUED | OUTPATIENT
Start: 2021-10-13 | End: 2021-10-13

## 2021-10-13 RX ORDER — FUROSEMIDE 40 MG/1
40 TABLET ORAL 2 TIMES DAILY
Status: DISCONTINUED | OUTPATIENT
Start: 2021-10-13 | End: 2021-10-15 | Stop reason: HOSPADM

## 2021-10-13 RX ADMIN — APIXABAN 2.5 MG: 2.5 TABLET, FILM COATED ORAL at 08:10

## 2021-10-13 RX ADMIN — FLUTICASONE FUROATE AND VILANTEROL TRIFENATATE 1 PUFF: 200; 25 POWDER RESPIRATORY (INHALATION) at 07:10

## 2021-10-13 RX ADMIN — APIXABAN 2.5 MG: 2.5 TABLET, FILM COATED ORAL at 09:10

## 2021-10-13 RX ADMIN — POTASSIUM CHLORIDE 40 MEQ: 1500 TABLET, EXTENDED RELEASE ORAL at 08:10

## 2021-10-13 RX ADMIN — FUROSEMIDE 40 MG: 20 INJECTION, SOLUTION INTRAMUSCULAR; INTRAVENOUS at 12:10

## 2021-10-13 RX ADMIN — FUROSEMIDE 40 MG: 20 INJECTION, SOLUTION INTRAMUSCULAR; INTRAVENOUS at 01:10

## 2021-10-13 RX ADMIN — DILTIAZEM HYDROCHLORIDE 30 MG: 30 TABLET, FILM COATED ORAL at 06:10

## 2021-10-13 RX ADMIN — LEVALBUTEROL HYDROCHLORIDE 1.25 MG: 1.25 SOLUTION, CONCENTRATE RESPIRATORY (INHALATION) at 01:10

## 2021-10-13 RX ADMIN — ACETAMINOPHEN 650 MG: 325 TABLET, FILM COATED ORAL at 03:10

## 2021-10-13 RX ADMIN — LEVALBUTEROL HYDROCHLORIDE 1.25 MG: 1.25 SOLUTION, CONCENTRATE RESPIRATORY (INHALATION) at 07:10

## 2021-10-13 RX ADMIN — DILTIAZEM HYDROCHLORIDE 30 MG: 30 TABLET, FILM COATED ORAL at 12:10

## 2021-10-13 RX ADMIN — METOPROLOL TARTRATE 25 MG: 25 TABLET, FILM COATED ORAL at 08:10

## 2021-10-13 RX ADMIN — ACETAMINOPHEN 650 MG: 325 TABLET, FILM COATED ORAL at 02:10

## 2021-10-13 RX ADMIN — LEVOFLOXACIN 250 MG: 250 TABLET, FILM COATED ORAL at 06:10

## 2021-10-13 RX ADMIN — TRAZODONE HYDROCHLORIDE 50 MG: 50 TABLET ORAL at 09:10

## 2021-10-13 RX ADMIN — METOPROLOL TARTRATE 25 MG: 25 TABLET, FILM COATED ORAL at 09:10

## 2021-10-13 RX ADMIN — DILTIAZEM HYDROCHLORIDE 5 MG/HR: 100 INJECTION, POWDER, LYOPHILIZED, FOR SOLUTION INTRAVENOUS at 01:10

## 2021-10-13 RX ADMIN — DILTIAZEM HYDROCHLORIDE 30 MG: 30 TABLET, FILM COATED ORAL at 11:10

## 2021-10-13 RX ADMIN — PANTOPRAZOLE SODIUM 40 MG: 40 TABLET, DELAYED RELEASE ORAL at 06:10

## 2021-10-13 RX ADMIN — FUROSEMIDE 40 MG: 10 INJECTION, SOLUTION INTRAMUSCULAR; INTRAVENOUS at 01:10

## 2021-10-13 RX ADMIN — FUROSEMIDE 40 MG: 40 TABLET ORAL at 11:10

## 2021-10-14 PROBLEM — I50.9 CONGESTIVE HEART FAILURE: Status: ACTIVE | Noted: 2021-10-14

## 2021-10-14 LAB
ALBUMIN SERPL BCP-MCNC: 2.6 G/DL (ref 3.5–5.2)
ALP SERPL-CCNC: 55 U/L (ref 55–135)
ALT SERPL W/O P-5'-P-CCNC: 21 U/L (ref 10–44)
ANION GAP SERPL CALC-SCNC: 11 MMOL/L (ref 8–16)
AST SERPL-CCNC: 17 U/L (ref 10–40)
BILIRUB SERPL-MCNC: 0.8 MG/DL (ref 0.1–1)
BUN SERPL-MCNC: 28 MG/DL (ref 8–23)
CALCIUM SERPL-MCNC: 7.9 MG/DL (ref 8.7–10.5)
CHLORIDE SERPL-SCNC: 99 MMOL/L (ref 95–110)
CO2 SERPL-SCNC: 33 MMOL/L (ref 23–29)
CREAT SERPL-MCNC: 1.6 MG/DL (ref 0.5–1.4)
ERYTHROCYTE [DISTWIDTH] IN BLOOD BY AUTOMATED COUNT: 22.1 % (ref 11.5–14.5)
EST. GFR  (AFRICAN AMERICAN): 36.1 ML/MIN/1.73 M^2
EST. GFR  (NON AFRICAN AMERICAN): 31.3 ML/MIN/1.73 M^2
GLUCOSE SERPL-MCNC: 96 MG/DL (ref 70–110)
HCT VFR BLD AUTO: 29.3 % (ref 37–48.5)
HGB BLD-MCNC: 9 G/DL (ref 12–16)
MAGNESIUM SERPL-MCNC: 1.9 MG/DL (ref 1.6–2.6)
MCH RBC QN AUTO: 30.4 PG (ref 27–31)
MCHC RBC AUTO-ENTMCNC: 30.7 G/DL (ref 32–36)
MCV RBC AUTO: 99 FL (ref 82–98)
PLATELET # BLD AUTO: 248 K/UL (ref 150–450)
PMV BLD AUTO: 9.9 FL (ref 9.2–12.9)
POTASSIUM SERPL-SCNC: 3.5 MMOL/L (ref 3.5–5.1)
PROT SERPL-MCNC: 5.3 G/DL (ref 6–8.4)
RBC # BLD AUTO: 2.96 M/UL (ref 4–5.4)
SODIUM SERPL-SCNC: 143 MMOL/L (ref 136–145)
WBC # BLD AUTO: 6.26 K/UL (ref 3.9–12.7)

## 2021-10-14 PROCEDURE — 94799 UNLISTED PULMONARY SVC/PX: CPT

## 2021-10-14 PROCEDURE — 94761 N-INVAS EAR/PLS OXIMETRY MLT: CPT

## 2021-10-14 PROCEDURE — 99900035 HC TECH TIME PER 15 MIN (STAT)

## 2021-10-14 PROCEDURE — 85027 COMPLETE CBC AUTOMATED: CPT | Performed by: INTERNAL MEDICINE

## 2021-10-14 PROCEDURE — 94640 AIRWAY INHALATION TREATMENT: CPT

## 2021-10-14 PROCEDURE — 80053 COMPREHEN METABOLIC PANEL: CPT | Performed by: INTERNAL MEDICINE

## 2021-10-14 PROCEDURE — 99900031 HC PATIENT EDUCATION (STAT)

## 2021-10-14 PROCEDURE — 30200315 PPD INTRADERMAL TEST REV CODE 302: Performed by: INTERNAL MEDICINE

## 2021-10-14 PROCEDURE — 25000003 PHARM REV CODE 250: Performed by: INTERNAL MEDICINE

## 2021-10-14 PROCEDURE — 21400001 HC TELEMETRY ROOM

## 2021-10-14 PROCEDURE — 83735 ASSAY OF MAGNESIUM: CPT | Performed by: INTERNAL MEDICINE

## 2021-10-14 PROCEDURE — 27000221 HC OXYGEN, UP TO 24 HOURS

## 2021-10-14 PROCEDURE — 86580 TB INTRADERMAL TEST: CPT | Performed by: INTERNAL MEDICINE

## 2021-10-14 PROCEDURE — 36415 COLL VENOUS BLD VENIPUNCTURE: CPT | Performed by: INTERNAL MEDICINE

## 2021-10-14 RX ORDER — METOPROLOL TARTRATE 1 MG/ML
5 INJECTION, SOLUTION INTRAVENOUS
Status: DISCONTINUED | OUTPATIENT
Start: 2021-10-14 | End: 2021-10-15 | Stop reason: HOSPADM

## 2021-10-14 RX ADMIN — METOPROLOL TARTRATE 25 MG: 25 TABLET, FILM COATED ORAL at 09:10

## 2021-10-14 RX ADMIN — LEVOFLOXACIN 250 MG: 250 TABLET, FILM COATED ORAL at 05:10

## 2021-10-14 RX ADMIN — TRAZODONE HYDROCHLORIDE 50 MG: 50 TABLET ORAL at 09:10

## 2021-10-14 RX ADMIN — APIXABAN 2.5 MG: 2.5 TABLET, FILM COATED ORAL at 09:10

## 2021-10-14 RX ADMIN — TUBERCULIN PURIFIED PROTEIN DERIVATIVE 5 UNITS: 5 INJECTION, SOLUTION INTRADERMAL at 04:10

## 2021-10-14 RX ADMIN — POTASSIUM CHLORIDE 20 MEQ: 20 TABLET, EXTENDED RELEASE ORAL at 01:10

## 2021-10-14 RX ADMIN — PANTOPRAZOLE SODIUM 40 MG: 40 TABLET, DELAYED RELEASE ORAL at 05:10

## 2021-10-14 RX ADMIN — DILTIAZEM HYDROCHLORIDE 30 MG: 30 TABLET, FILM COATED ORAL at 12:10

## 2021-10-14 RX ADMIN — ACETAMINOPHEN 650 MG: 325 TABLET, FILM COATED ORAL at 09:10

## 2021-10-14 RX ADMIN — ACETAMINOPHEN 650 MG: 325 TABLET, FILM COATED ORAL at 07:10

## 2021-10-14 RX ADMIN — FUROSEMIDE 40 MG: 40 TABLET ORAL at 05:10

## 2021-10-14 RX ADMIN — FLUTICASONE FUROATE AND VILANTEROL TRIFENATATE 1 PUFF: 200; 25 POWDER RESPIRATORY (INHALATION) at 07:10

## 2021-10-14 RX ADMIN — ACETAMINOPHEN 650 MG: 325 TABLET, FILM COATED ORAL at 01:10

## 2021-10-14 RX ADMIN — DILTIAZEM HYDROCHLORIDE 30 MG: 30 TABLET, FILM COATED ORAL at 05:10

## 2021-10-14 RX ADMIN — ACETAMINOPHEN 650 MG: 325 TABLET, FILM COATED ORAL at 02:10

## 2021-10-14 RX ADMIN — FUROSEMIDE 40 MG: 40 TABLET ORAL at 09:10

## 2021-10-15 VITALS
HEIGHT: 63 IN | SYSTOLIC BLOOD PRESSURE: 99 MMHG | HEART RATE: 73 BPM | RESPIRATION RATE: 17 BRPM | BODY MASS INDEX: 18.98 KG/M2 | WEIGHT: 107.13 LBS | OXYGEN SATURATION: 96 % | DIASTOLIC BLOOD PRESSURE: 54 MMHG | TEMPERATURE: 98 F

## 2021-10-15 LAB — BACTERIA UR CULT: NO GROWTH

## 2021-10-15 PROCEDURE — 94799 UNLISTED PULMONARY SVC/PX: CPT

## 2021-10-15 PROCEDURE — 99900031 HC PATIENT EDUCATION (STAT)

## 2021-10-15 PROCEDURE — 99900035 HC TECH TIME PER 15 MIN (STAT)

## 2021-10-15 PROCEDURE — 94640 AIRWAY INHALATION TREATMENT: CPT

## 2021-10-15 PROCEDURE — 25000003 PHARM REV CODE 250: Performed by: INTERNAL MEDICINE

## 2021-10-15 PROCEDURE — 94761 N-INVAS EAR/PLS OXIMETRY MLT: CPT

## 2021-10-15 RX ADMIN — METOPROLOL TARTRATE 25 MG: 25 TABLET, FILM COATED ORAL at 09:10

## 2021-10-15 RX ADMIN — ALPRAZOLAM 0.5 MG: 0.5 TABLET ORAL at 01:10

## 2021-10-15 RX ADMIN — APIXABAN 2.5 MG: 2.5 TABLET, FILM COATED ORAL at 09:10

## 2021-10-15 RX ADMIN — DILTIAZEM HYDROCHLORIDE 30 MG: 30 TABLET, FILM COATED ORAL at 12:10

## 2021-10-15 RX ADMIN — FLUTICASONE FUROATE AND VILANTEROL TRIFENATATE 1 PUFF: 200; 25 POWDER RESPIRATORY (INHALATION) at 07:10

## 2021-10-15 RX ADMIN — ACETAMINOPHEN 650 MG: 325 TABLET, FILM COATED ORAL at 01:10

## 2021-10-15 RX ADMIN — PANTOPRAZOLE SODIUM 40 MG: 40 TABLET, DELAYED RELEASE ORAL at 06:10

## 2021-10-15 RX ADMIN — FUROSEMIDE 40 MG: 40 TABLET ORAL at 09:10

## 2021-10-15 RX ADMIN — LEVOFLOXACIN 250 MG: 250 TABLET, FILM COATED ORAL at 06:10

## 2021-10-15 RX ADMIN — ACETAMINOPHEN 650 MG: 325 TABLET, FILM COATED ORAL at 10:10

## 2021-10-22 ENCOUNTER — HOSPITAL ENCOUNTER (INPATIENT)
Facility: HOSPITAL | Age: 76
LOS: 3 days | Discharge: NURSING, REHAB OR LONG-TERM CARE | DRG: 381 | End: 2021-10-25
Attending: EMERGENCY MEDICINE | Admitting: INTERNAL MEDICINE
Payer: MEDICARE

## 2021-10-22 DIAGNOSIS — D64.9 SYMPTOMATIC ANEMIA: Primary | ICD-10-CM

## 2021-10-22 DIAGNOSIS — R53.1 WEAKNESS: ICD-10-CM

## 2021-10-22 DIAGNOSIS — R06.02 SHORTNESS OF BREATH: ICD-10-CM

## 2021-10-22 DIAGNOSIS — M81.0 AGE-RELATED OSTEOPOROSIS WITHOUT CURRENT PATHOLOGICAL FRACTURE: ICD-10-CM

## 2021-10-22 DIAGNOSIS — K92.1 GASTROINTESTINAL HEMORRHAGE WITH MELENA: ICD-10-CM

## 2021-10-22 DIAGNOSIS — R07.9 CHEST PAIN: ICD-10-CM

## 2021-10-22 PROBLEM — I38 VALVULAR HEART DISEASE: Chronic | Status: ACTIVE | Noted: 2021-10-22

## 2021-10-22 PROBLEM — R41.3 MEMORY DEFICITS: Chronic | Status: ACTIVE | Noted: 2021-10-22

## 2021-10-22 PROBLEM — K92.2 UGIB (UPPER GASTROINTESTINAL BLEED): Status: ACTIVE | Noted: 2021-10-22

## 2021-10-22 PROBLEM — I50.42 CHRONIC COMBINED SYSTOLIC AND DIASTOLIC HEART FAILURE: Chronic | Status: ACTIVE | Noted: 2021-10-22

## 2021-10-22 PROBLEM — I95.9 HYPOTENSION: Chronic | Status: ACTIVE | Noted: 2021-10-22

## 2021-10-22 PROBLEM — D62 ACUTE BLOOD LOSS ANEMIA: Status: ACTIVE | Noted: 2021-10-22

## 2021-10-22 LAB
ABO + RH BLD: NORMAL
ALBUMIN SERPL BCP-MCNC: 2.6 G/DL (ref 3.5–5.2)
ALP SERPL-CCNC: 54 U/L (ref 55–135)
ALT SERPL W/O P-5'-P-CCNC: 17 U/L (ref 10–44)
ANION GAP SERPL CALC-SCNC: 7 MMOL/L (ref 8–16)
ANISOCYTOSIS BLD QL SMEAR: ABNORMAL
APTT PPP: 26.2 SEC (ref 23.3–35.1)
AST SERPL-CCNC: 17 U/L (ref 10–40)
BASOPHILS # BLD AUTO: 0.05 K/UL (ref 0–0.2)
BASOPHILS NFR BLD: 0.5 % (ref 0–1.9)
BILIRUB SERPL-MCNC: 0.8 MG/DL (ref 0.1–1)
BLD GP AB SCN CELLS X3 SERPL QL: NORMAL
BLD PROD TYP BPU: NORMAL
BLOOD UNIT EXPIRATION DATE: NORMAL
BLOOD UNIT TYPE CODE: 6200
BLOOD UNIT TYPE: NORMAL
BNP SERPL-MCNC: 2342 PG/ML (ref 0–99)
BUN SERPL-MCNC: 41 MG/DL (ref 8–23)
CALCIUM SERPL-MCNC: 7.2 MG/DL (ref 8.7–10.5)
CHLORIDE SERPL-SCNC: 101 MMOL/L (ref 95–110)
CO2 SERPL-SCNC: 27 MMOL/L (ref 23–29)
CODING SYSTEM: NORMAL
CREAT SERPL-MCNC: 1.7 MG/DL (ref 0.5–1.4)
DIFFERENTIAL METHOD: ABNORMAL
DISPENSE STATUS: NORMAL
EOSINOPHIL # BLD AUTO: 0.4 K/UL (ref 0–0.5)
EOSINOPHIL NFR BLD: 3.3 % (ref 0–8)
ERYTHROCYTE [DISTWIDTH] IN BLOOD BY AUTOMATED COUNT: 22.3 % (ref 11.5–14.5)
EST. GFR  (AFRICAN AMERICAN): 33.5 ML/MIN/1.73 M^2
EST. GFR  (NON AFRICAN AMERICAN): 29.1 ML/MIN/1.73 M^2
GLUCOSE SERPL-MCNC: 123 MG/DL (ref 70–110)
HCT VFR BLD AUTO: 18.4 % (ref 37–48.5)
HGB BLD-MCNC: 5.4 G/DL (ref 12–16)
HYPOCHROMIA BLD QL SMEAR: ABNORMAL
IMM GRANULOCYTES # BLD AUTO: 0.35 K/UL (ref 0–0.04)
IMM GRANULOCYTES NFR BLD AUTO: 3.3 % (ref 0–0.5)
INR PPP: 1.3
LYMPHOCYTES # BLD AUTO: 1.9 K/UL (ref 1–4.8)
LYMPHOCYTES NFR BLD: 17.7 % (ref 18–48)
MCH RBC QN AUTO: 29.8 PG (ref 27–31)
MCHC RBC AUTO-ENTMCNC: 29.3 G/DL (ref 32–36)
MCV RBC AUTO: 102 FL (ref 82–98)
MONOCYTES # BLD AUTO: 0.6 K/UL (ref 0.3–1)
MONOCYTES NFR BLD: 5.8 % (ref 4–15)
NEUTROPHILS # BLD AUTO: 7.3 K/UL (ref 1.8–7.7)
NEUTROPHILS NFR BLD: 69.4 % (ref 38–73)
NRBC BLD-RTO: 0 /100 WBC
NUM UNITS TRANS PACKED RBC: NORMAL
OB PNL STL: POSITIVE
PLATELET # BLD AUTO: 334 K/UL (ref 150–450)
PMV BLD AUTO: 9.4 FL (ref 9.2–12.9)
POIKILOCYTOSIS BLD QL SMEAR: SLIGHT
POTASSIUM SERPL-SCNC: 3.5 MMOL/L (ref 3.5–5.1)
PROT SERPL-MCNC: 5.3 G/DL (ref 6–8.4)
PROTHROMBIN TIME: 15.2 SEC (ref 11.4–13.7)
RBC # BLD AUTO: 1.81 M/UL (ref 4–5.4)
SARS-COV-2 RDRP RESP QL NAA+PROBE: NEGATIVE
SODIUM SERPL-SCNC: 135 MMOL/L (ref 136–145)
TROPONIN I SERPL DL<=0.01 NG/ML-MCNC: <0.03 NG/ML
WBC # BLD AUTO: 10.54 K/UL (ref 3.9–12.7)

## 2021-10-22 PROCEDURE — 86900 BLOOD TYPING SEROLOGIC ABO: CPT | Performed by: EMERGENCY MEDICINE

## 2021-10-22 PROCEDURE — 93010 EKG 12-LEAD: ICD-10-PCS | Mod: 76,,, | Performed by: SPECIALIST

## 2021-10-22 PROCEDURE — 82272 OCCULT BLD FECES 1-3 TESTS: CPT | Performed by: EMERGENCY MEDICINE

## 2021-10-22 PROCEDURE — 99900035 HC TECH TIME PER 15 MIN (STAT)

## 2021-10-22 PROCEDURE — 36430 TRANSFUSION BLD/BLD COMPNT: CPT

## 2021-10-22 PROCEDURE — C9113 INJ PANTOPRAZOLE SODIUM, VIA: HCPCS | Performed by: EMERGENCY MEDICINE

## 2021-10-22 PROCEDURE — 93005 ELECTROCARDIOGRAM TRACING: CPT | Performed by: SPECIALIST

## 2021-10-22 PROCEDURE — 99285 EMERGENCY DEPT VISIT HI MDM: CPT | Mod: 25

## 2021-10-22 PROCEDURE — 85610 PROTHROMBIN TIME: CPT | Performed by: EMERGENCY MEDICINE

## 2021-10-22 PROCEDURE — 93010 ELECTROCARDIOGRAM REPORT: CPT | Mod: ,,, | Performed by: SPECIALIST

## 2021-10-22 PROCEDURE — 36415 COLL VENOUS BLD VENIPUNCTURE: CPT | Performed by: EMERGENCY MEDICINE

## 2021-10-22 PROCEDURE — 86920 COMPATIBILITY TEST SPIN: CPT | Performed by: EMERGENCY MEDICINE

## 2021-10-22 PROCEDURE — 25000003 PHARM REV CODE 250: Performed by: EMERGENCY MEDICINE

## 2021-10-22 PROCEDURE — P9016 RBC LEUKOCYTES REDUCED: HCPCS | Performed by: INTERNAL MEDICINE

## 2021-10-22 PROCEDURE — 86920 COMPATIBILITY TEST SPIN: CPT | Performed by: INTERNAL MEDICINE

## 2021-10-22 PROCEDURE — 96374 THER/PROPH/DIAG INJ IV PUSH: CPT

## 2021-10-22 PROCEDURE — 25000003 PHARM REV CODE 250: Performed by: INTERNAL MEDICINE

## 2021-10-22 PROCEDURE — 20000000 HC ICU ROOM

## 2021-10-22 PROCEDURE — 85730 THROMBOPLASTIN TIME PARTIAL: CPT | Performed by: EMERGENCY MEDICINE

## 2021-10-22 PROCEDURE — 80053 COMPREHEN METABOLIC PANEL: CPT | Performed by: EMERGENCY MEDICINE

## 2021-10-22 PROCEDURE — 83880 ASSAY OF NATRIURETIC PEPTIDE: CPT | Performed by: EMERGENCY MEDICINE

## 2021-10-22 PROCEDURE — 84484 ASSAY OF TROPONIN QUANT: CPT | Performed by: EMERGENCY MEDICINE

## 2021-10-22 PROCEDURE — 93010 ELECTROCARDIOGRAM REPORT: CPT | Mod: 76,,, | Performed by: SPECIALIST

## 2021-10-22 PROCEDURE — 63600175 PHARM REV CODE 636 W HCPCS: Performed by: EMERGENCY MEDICINE

## 2021-10-22 PROCEDURE — U0002 COVID-19 LAB TEST NON-CDC: HCPCS | Performed by: INTERNAL MEDICINE

## 2021-10-22 PROCEDURE — 85025 COMPLETE CBC W/AUTO DIFF WBC: CPT | Performed by: EMERGENCY MEDICINE

## 2021-10-22 PROCEDURE — P9016 RBC LEUKOCYTES REDUCED: HCPCS | Performed by: EMERGENCY MEDICINE

## 2021-10-22 RX ORDER — VIT C/E/ZN/COPPR/LUTEIN/ZEAXAN 250MG-90MG
1000 CAPSULE ORAL DAILY
Status: ON HOLD | COMMUNITY
End: 2021-10-24 | Stop reason: HOSPADM

## 2021-10-22 RX ORDER — NALOXONE HCL 0.4 MG/ML
0.02 VIAL (ML) INJECTION
Status: DISCONTINUED | OUTPATIENT
Start: 2021-10-22 | End: 2021-10-25 | Stop reason: HOSPADM

## 2021-10-22 RX ORDER — GLUCAGON 1 MG
1 KIT INJECTION
Status: DISCONTINUED | OUTPATIENT
Start: 2021-10-22 | End: 2021-10-25 | Stop reason: HOSPADM

## 2021-10-22 RX ORDER — DILTIAZEM HYDROCHLORIDE 180 MG/1
180 CAPSULE, COATED, EXTENDED RELEASE ORAL DAILY
Status: DISCONTINUED | OUTPATIENT
Start: 2021-10-23 | End: 2021-10-25 | Stop reason: HOSPADM

## 2021-10-22 RX ORDER — IPRATROPIUM BROMIDE AND ALBUTEROL SULFATE 2.5; .5 MG/3ML; MG/3ML
3 SOLUTION RESPIRATORY (INHALATION) EVERY 6 HOURS PRN
Status: DISCONTINUED | OUTPATIENT
Start: 2021-10-22 | End: 2021-10-25 | Stop reason: HOSPADM

## 2021-10-22 RX ORDER — FUROSEMIDE 10 MG/ML
40 INJECTION INTRAMUSCULAR; INTRAVENOUS ONCE
Status: DISCONTINUED | OUTPATIENT
Start: 2021-10-22 | End: 2021-10-25 | Stop reason: HOSPADM

## 2021-10-22 RX ORDER — ACETAMINOPHEN 325 MG/1
650 TABLET ORAL EVERY 6 HOURS PRN
Status: DISCONTINUED | OUTPATIENT
Start: 2021-10-22 | End: 2021-10-25 | Stop reason: HOSPADM

## 2021-10-22 RX ORDER — ACETAMINOPHEN 325 MG/1
650 TABLET ORAL EVERY 6 HOURS PRN
Status: ON HOLD | COMMUNITY
End: 2022-01-05 | Stop reason: HOSPADM

## 2021-10-22 RX ORDER — POLYETHYLENE GLYCOL 3350 17 G/17G
17 POWDER, FOR SOLUTION ORAL DAILY
COMMUNITY

## 2021-10-22 RX ORDER — HYDROCODONE BITARTRATE AND ACETAMINOPHEN 500; 5 MG/1; MG/1
TABLET ORAL
Status: DISCONTINUED | OUTPATIENT
Start: 2021-10-22 | End: 2021-10-25 | Stop reason: HOSPADM

## 2021-10-22 RX ORDER — MIRTAZAPINE 15 MG/1
15 TABLET, FILM COATED ORAL NIGHTLY
Status: DISCONTINUED | OUTPATIENT
Start: 2021-10-22 | End: 2021-10-25 | Stop reason: HOSPADM

## 2021-10-22 RX ORDER — SODIUM CHLORIDE 9 MG/ML
INJECTION, SOLUTION INTRAVENOUS
Status: COMPLETED | OUTPATIENT
Start: 2021-10-22 | End: 2021-10-22

## 2021-10-22 RX ORDER — MULTIVITAMIN
1 TABLET ORAL DAILY
COMMUNITY
End: 2021-11-02 | Stop reason: CLARIF

## 2021-10-22 RX ORDER — IBUPROFEN 200 MG
24 TABLET ORAL
Status: DISCONTINUED | OUTPATIENT
Start: 2021-10-22 | End: 2021-10-25 | Stop reason: HOSPADM

## 2021-10-22 RX ORDER — MIDODRINE HYDROCHLORIDE 2.5 MG/1
2.5 TABLET ORAL 3 TIMES DAILY
Status: DISCONTINUED | OUTPATIENT
Start: 2021-10-22 | End: 2021-10-25 | Stop reason: HOSPADM

## 2021-10-22 RX ORDER — METOPROLOL TARTRATE 25 MG/1
12.5 TABLET ORAL 2 TIMES DAILY
Status: DISCONTINUED | OUTPATIENT
Start: 2021-10-22 | End: 2021-10-25 | Stop reason: HOSPADM

## 2021-10-22 RX ORDER — ALPRAZOLAM 0.5 MG/1
0.5 TABLET ORAL 2 TIMES DAILY PRN
Status: DISCONTINUED | OUTPATIENT
Start: 2021-10-22 | End: 2021-10-25 | Stop reason: HOSPADM

## 2021-10-22 RX ORDER — TALC
6 POWDER (GRAM) TOPICAL NIGHTLY PRN
Status: DISCONTINUED | OUTPATIENT
Start: 2021-10-22 | End: 2021-10-25 | Stop reason: HOSPADM

## 2021-10-22 RX ORDER — CIPROFLOXACIN 500 MG/1
500 TABLET ORAL 2 TIMES DAILY
COMMUNITY
Start: 2021-10-15 | End: 2021-10-24 | Stop reason: HOSPADM

## 2021-10-22 RX ORDER — GLUCOSAM/CHONDRO/HERB 149/HYAL 750-100 MG
1 TABLET ORAL DAILY
Status: ON HOLD | COMMUNITY
End: 2022-01-05 | Stop reason: HOSPADM

## 2021-10-22 RX ORDER — IBUPROFEN 200 MG
16 TABLET ORAL
Status: DISCONTINUED | OUTPATIENT
Start: 2021-10-22 | End: 2021-10-25 | Stop reason: HOSPADM

## 2021-10-22 RX ORDER — PANTOPRAZOLE SODIUM 40 MG/10ML
80 INJECTION, POWDER, LYOPHILIZED, FOR SOLUTION INTRAVENOUS
Status: COMPLETED | OUTPATIENT
Start: 2021-10-22 | End: 2021-10-22

## 2021-10-22 RX ORDER — FLUTICASONE FUROATE AND VILANTEROL 100; 25 UG/1; UG/1
1 POWDER RESPIRATORY (INHALATION) DAILY
Status: DISCONTINUED | OUTPATIENT
Start: 2021-10-23 | End: 2021-10-25 | Stop reason: HOSPADM

## 2021-10-22 RX ORDER — SODIUM CHLORIDE 0.9 % (FLUSH) 0.9 %
10 SYRINGE (ML) INJECTION EVERY 6 HOURS PRN
Status: DISCONTINUED | OUTPATIENT
Start: 2021-10-22 | End: 2021-10-25 | Stop reason: HOSPADM

## 2021-10-22 RX ORDER — ONDANSETRON 2 MG/ML
4 INJECTION INTRAMUSCULAR; INTRAVENOUS EVERY 8 HOURS PRN
Status: DISCONTINUED | OUTPATIENT
Start: 2021-10-22 | End: 2021-10-25 | Stop reason: HOSPADM

## 2021-10-22 RX ADMIN — SODIUM CHLORIDE: 0.9 INJECTION, SOLUTION INTRAVENOUS at 03:10

## 2021-10-22 RX ADMIN — PANTOPRAZOLE SODIUM 80 MG: 40 INJECTION, POWDER, LYOPHILIZED, FOR SOLUTION INTRAVENOUS at 03:10

## 2021-10-22 RX ADMIN — MIDODRINE HYDROCHLORIDE 2.5 MG: 2.5 TABLET ORAL at 06:10

## 2021-10-22 RX ADMIN — SODIUM CHLORIDE 40 MG: 9 INJECTION, SOLUTION INTRAVENOUS at 07:10

## 2021-10-22 RX ADMIN — SODIUM CHLORIDE 40 MG: 9 INJECTION, SOLUTION INTRAVENOUS at 11:10

## 2021-10-22 RX ADMIN — MIDODRINE HYDROCHLORIDE 2.5 MG: 2.5 TABLET ORAL at 09:10

## 2021-10-23 LAB
ALBUMIN SERPL BCP-MCNC: 2.4 G/DL (ref 3.5–5.2)
ALP SERPL-CCNC: 50 U/L (ref 55–135)
ALT SERPL W/O P-5'-P-CCNC: 14 U/L (ref 10–44)
ANION GAP SERPL CALC-SCNC: 9 MMOL/L (ref 8–16)
AST SERPL-CCNC: 17 U/L (ref 10–40)
BASOPHILS # BLD AUTO: 0.08 K/UL (ref 0–0.2)
BASOPHILS NFR BLD: 0.7 % (ref 0–1.9)
BILIRUB SERPL-MCNC: 1.3 MG/DL (ref 0.1–1)
BUN SERPL-MCNC: 37 MG/DL (ref 8–23)
CALCIUM SERPL-MCNC: 7.3 MG/DL (ref 8.7–10.5)
CHLORIDE SERPL-SCNC: 106 MMOL/L (ref 95–110)
CO2 SERPL-SCNC: 24 MMOL/L (ref 23–29)
CREAT SERPL-MCNC: 1.4 MG/DL (ref 0.5–1.4)
DIFFERENTIAL METHOD: ABNORMAL
EOSINOPHIL # BLD AUTO: 0.2 K/UL (ref 0–0.5)
EOSINOPHIL NFR BLD: 1.5 % (ref 0–8)
ERYTHROCYTE [DISTWIDTH] IN BLOOD BY AUTOMATED COUNT: 23.5 % (ref 11.5–14.5)
EST. GFR  (AFRICAN AMERICAN): 42.4 ML/MIN/1.73 M^2
EST. GFR  (NON AFRICAN AMERICAN): 36.8 ML/MIN/1.73 M^2
GLUCOSE SERPL-MCNC: 100 MG/DL (ref 70–110)
HCT VFR BLD AUTO: 26.7 % (ref 37–48.5)
HCT VFR BLD AUTO: 29.9 % (ref 37–48.5)
HCT VFR BLD AUTO: 30.9 % (ref 37–48.5)
HGB BLD-MCNC: 8.5 G/DL (ref 12–16)
HGB BLD-MCNC: 9.3 G/DL (ref 12–16)
HGB BLD-MCNC: 9.6 G/DL (ref 12–16)
IMM GRANULOCYTES # BLD AUTO: 0.43 K/UL (ref 0–0.04)
IMM GRANULOCYTES NFR BLD AUTO: 4 % (ref 0–0.5)
LYMPHOCYTES # BLD AUTO: 1.9 K/UL (ref 1–4.8)
LYMPHOCYTES NFR BLD: 17.5 % (ref 18–48)
MAGNESIUM SERPL-MCNC: 2 MG/DL (ref 1.6–2.6)
MCH RBC QN AUTO: 28.2 PG (ref 27–31)
MCHC RBC AUTO-ENTMCNC: 31.1 G/DL (ref 32–36)
MCV RBC AUTO: 91 FL (ref 82–98)
MONOCYTES # BLD AUTO: 0.5 K/UL (ref 0.3–1)
MONOCYTES NFR BLD: 4.7 % (ref 4–15)
NEUTROPHILS # BLD AUTO: 7.7 K/UL (ref 1.8–7.7)
NEUTROPHILS NFR BLD: 71.6 % (ref 38–73)
NRBC BLD-RTO: 1 /100 WBC
PLATELET # BLD AUTO: 291 K/UL (ref 150–450)
PMV BLD AUTO: 9.2 FL (ref 9.2–12.9)
POTASSIUM SERPL-SCNC: 3.8 MMOL/L (ref 3.5–5.1)
PROT SERPL-MCNC: 4.8 G/DL (ref 6–8.4)
RBC # BLD AUTO: 3.41 M/UL (ref 4–5.4)
SODIUM SERPL-SCNC: 139 MMOL/L (ref 136–145)
WBC # BLD AUTO: 10.78 K/UL (ref 3.9–12.7)

## 2021-10-23 PROCEDURE — 94761 N-INVAS EAR/PLS OXIMETRY MLT: CPT

## 2021-10-23 PROCEDURE — 12000002 HC ACUTE/MED SURGE SEMI-PRIVATE ROOM

## 2021-10-23 PROCEDURE — 25000003 PHARM REV CODE 250: Performed by: INTERNAL MEDICINE

## 2021-10-23 PROCEDURE — 83735 ASSAY OF MAGNESIUM: CPT | Performed by: INTERNAL MEDICINE

## 2021-10-23 PROCEDURE — 80053 COMPREHEN METABOLIC PANEL: CPT | Performed by: INTERNAL MEDICINE

## 2021-10-23 PROCEDURE — 85018 HEMOGLOBIN: CPT | Performed by: INTERNAL MEDICINE

## 2021-10-23 PROCEDURE — 88305 TISSUE EXAM BY PATHOLOGIST: CPT | Mod: TC,59

## 2021-10-23 PROCEDURE — 27000221 HC OXYGEN, UP TO 24 HOURS

## 2021-10-23 PROCEDURE — 99900035 HC TECH TIME PER 15 MIN (STAT)

## 2021-10-23 PROCEDURE — 85014 HEMATOCRIT: CPT | Mod: 91 | Performed by: INTERNAL MEDICINE

## 2021-10-23 PROCEDURE — 25000242 PHARM REV CODE 250 ALT 637 W/ HCPCS: Performed by: INTERNAL MEDICINE

## 2021-10-23 PROCEDURE — 36415 COLL VENOUS BLD VENIPUNCTURE: CPT | Performed by: INTERNAL MEDICINE

## 2021-10-23 PROCEDURE — 85025 COMPLETE CBC W/AUTO DIFF WBC: CPT | Performed by: INTERNAL MEDICINE

## 2021-10-23 PROCEDURE — 94640 AIRWAY INHALATION TREATMENT: CPT

## 2021-10-23 PROCEDURE — 25000003 PHARM REV CODE 250: Performed by: EMERGENCY MEDICINE

## 2021-10-23 PROCEDURE — C9113 INJ PANTOPRAZOLE SODIUM, VIA: HCPCS | Performed by: EMERGENCY MEDICINE

## 2021-10-23 PROCEDURE — 43239 EGD BIOPSY SINGLE/MULTIPLE: CPT | Performed by: INTERNAL MEDICINE

## 2021-10-23 PROCEDURE — 25000003 PHARM REV CODE 250

## 2021-10-23 PROCEDURE — 27200043 HC FORCEPS, BIOPSY: Performed by: INTERNAL MEDICINE

## 2021-10-23 PROCEDURE — 63600175 PHARM REV CODE 636 W HCPCS: Performed by: EMERGENCY MEDICINE

## 2021-10-23 PROCEDURE — 63600175 PHARM REV CODE 636 W HCPCS: Performed by: INTERNAL MEDICINE

## 2021-10-23 PROCEDURE — 99900031 HC PATIENT EDUCATION (STAT)

## 2021-10-23 RX ORDER — MIDAZOLAM HYDROCHLORIDE 1 MG/ML
INJECTION INTRAMUSCULAR; INTRAVENOUS
Status: COMPLETED | OUTPATIENT
Start: 2021-10-23 | End: 2021-10-23

## 2021-10-23 RX ORDER — MEPERIDINE HYDROCHLORIDE 100 MG/ML
INJECTION INTRAMUSCULAR; INTRAVENOUS; SUBCUTANEOUS
Status: COMPLETED | OUTPATIENT
Start: 2021-10-23 | End: 2021-10-23

## 2021-10-23 RX ORDER — CHLORHEXIDINE GLUCONATE ORAL RINSE 1.2 MG/ML
15 SOLUTION DENTAL 2 TIMES DAILY
Status: DISCONTINUED | OUTPATIENT
Start: 2021-10-23 | End: 2021-10-25 | Stop reason: HOSPADM

## 2021-10-23 RX ORDER — PANTOPRAZOLE SODIUM 40 MG/1
40 TABLET, DELAYED RELEASE ORAL DAILY
Status: DISCONTINUED | OUTPATIENT
Start: 2021-10-23 | End: 2021-10-23

## 2021-10-23 RX ORDER — PANTOPRAZOLE SODIUM 40 MG/1
40 TABLET, DELAYED RELEASE ORAL DAILY
Status: DISCONTINUED | OUTPATIENT
Start: 2021-10-23 | End: 2021-10-25 | Stop reason: HOSPADM

## 2021-10-23 RX ORDER — MUPIROCIN 20 MG/G
OINTMENT TOPICAL 2 TIMES DAILY
Status: DISCONTINUED | OUTPATIENT
Start: 2021-10-23 | End: 2021-10-25 | Stop reason: HOSPADM

## 2021-10-23 RX ADMIN — MELATONIN 6 MG: at 09:10

## 2021-10-23 RX ADMIN — Medication 12.5 MG: at 01:10

## 2021-10-23 RX ADMIN — MIDODRINE HYDROCHLORIDE 2.5 MG: 2.5 TABLET ORAL at 03:10

## 2021-10-23 RX ADMIN — METOPROLOL TARTRATE 12.5 MG: 25 TABLET, FILM COATED ORAL at 09:10

## 2021-10-23 RX ADMIN — MUPIROCIN: 20 OINTMENT TOPICAL at 08:10

## 2021-10-23 RX ADMIN — METOPROLOL TARTRATE 12.5 MG: 25 TABLET, FILM COATED ORAL at 08:10

## 2021-10-23 RX ADMIN — SODIUM CHLORIDE 40 MG: 9 INJECTION, SOLUTION INTRAVENOUS at 03:10

## 2021-10-23 RX ADMIN — IPRATROPIUM BROMIDE AND ALBUTEROL SULFATE 3 ML: .5; 3 SOLUTION RESPIRATORY (INHALATION) at 09:10

## 2021-10-23 RX ADMIN — CHLORHEXIDINE GLUCONATE 15 ML: 1.2 RINSE ORAL at 08:10

## 2021-10-23 RX ADMIN — MIDODRINE HYDROCHLORIDE 2.5 MG: 2.5 TABLET ORAL at 09:10

## 2021-10-23 RX ADMIN — MIDAZOLAM HYDROCHLORIDE 1 MG: 1 INJECTION, SOLUTION INTRAMUSCULAR; INTRAVENOUS at 01:10

## 2021-10-23 RX ADMIN — PANTOPRAZOLE SODIUM 40 MG: 40 TABLET, DELAYED RELEASE ORAL at 03:10

## 2021-10-23 RX ADMIN — MIRTAZAPINE 15 MG: 15 TABLET, FILM COATED ORAL at 08:10

## 2021-10-23 RX ADMIN — THERA TABS 1 TABLET: TAB at 09:10

## 2021-10-23 RX ADMIN — SODIUM CHLORIDE 40 MG: 9 INJECTION, SOLUTION INTRAVENOUS at 09:10

## 2021-10-23 RX ADMIN — Medication 27.5 MG: at 01:10

## 2021-10-23 RX ADMIN — DILTIAZEM HYDROCHLORIDE 180 MG: 180 CAPSULE, COATED, EXTENDED RELEASE ORAL at 09:10

## 2021-10-23 RX ADMIN — FLUTICASONE FUROATE AND VILANTEROL TRIFENATATE 1 PUFF: 100; 25 POWDER RESPIRATORY (INHALATION) at 08:10

## 2021-10-23 RX ADMIN — MIDODRINE HYDROCHLORIDE 2.5 MG: 2.5 TABLET ORAL at 08:10

## 2021-10-24 LAB
ERYTHROCYTE [DISTWIDTH] IN BLOOD BY AUTOMATED COUNT: 24.1 % (ref 11.5–14.5)
HCT VFR BLD AUTO: 24.9 % (ref 37–48.5)
HCT VFR BLD AUTO: 25.2 % (ref 37–48.5)
HCT VFR BLD AUTO: 26.8 % (ref 37–48.5)
HCT VFR BLD AUTO: 27.1 % (ref 37–48.5)
HGB BLD-MCNC: 7.8 G/DL (ref 12–16)
HGB BLD-MCNC: 8 G/DL (ref 12–16)
HGB BLD-MCNC: 8.4 G/DL (ref 12–16)
HGB BLD-MCNC: 8.5 G/DL (ref 12–16)
MCH RBC QN AUTO: 29 PG (ref 27–31)
MCHC RBC AUTO-ENTMCNC: 31.3 G/DL (ref 32–36)
MCV RBC AUTO: 93 FL (ref 82–98)
PLATELET # BLD AUTO: 217 K/UL (ref 150–450)
PMV BLD AUTO: 9 FL (ref 9.2–12.9)
RBC # BLD AUTO: 2.69 M/UL (ref 4–5.4)
WBC # BLD AUTO: 10.1 K/UL (ref 3.9–12.7)

## 2021-10-24 PROCEDURE — 12000002 HC ACUTE/MED SURGE SEMI-PRIVATE ROOM

## 2021-10-24 PROCEDURE — 36415 COLL VENOUS BLD VENIPUNCTURE: CPT | Performed by: INTERNAL MEDICINE

## 2021-10-24 PROCEDURE — 94640 AIRWAY INHALATION TREATMENT: CPT

## 2021-10-24 PROCEDURE — 99900031 HC PATIENT EDUCATION (STAT)

## 2021-10-24 PROCEDURE — 25000242 PHARM REV CODE 250 ALT 637 W/ HCPCS: Performed by: INTERNAL MEDICINE

## 2021-10-24 PROCEDURE — 85014 HEMATOCRIT: CPT | Performed by: INTERNAL MEDICINE

## 2021-10-24 PROCEDURE — 99900035 HC TECH TIME PER 15 MIN (STAT)

## 2021-10-24 PROCEDURE — 94761 N-INVAS EAR/PLS OXIMETRY MLT: CPT

## 2021-10-24 PROCEDURE — 25000003 PHARM REV CODE 250: Performed by: INTERNAL MEDICINE

## 2021-10-24 PROCEDURE — 27000221 HC OXYGEN, UP TO 24 HOURS

## 2021-10-24 PROCEDURE — 25000003 PHARM REV CODE 250

## 2021-10-24 PROCEDURE — 85018 HEMOGLOBIN: CPT | Performed by: INTERNAL MEDICINE

## 2021-10-24 PROCEDURE — 85027 COMPLETE CBC AUTOMATED: CPT | Performed by: INTERNAL MEDICINE

## 2021-10-24 RX ORDER — PANTOPRAZOLE SODIUM 40 MG/1
40 TABLET, DELAYED RELEASE ORAL DAILY
Qty: 90 TABLET | Refills: 0 | Status: SHIPPED | OUTPATIENT
Start: 2021-10-24 | End: 2022-01-22

## 2021-10-24 RX ORDER — ALENDRONATE SODIUM 70 MG/1
35 TABLET ORAL
Qty: 4 TABLET | Refills: 4 | Status: ON HOLD
Start: 2021-10-24 | End: 2022-01-05 | Stop reason: HOSPADM

## 2021-10-24 RX ORDER — ALPRAZOLAM 0.5 MG/1
0.5 TABLET ORAL ONCE
Status: DISCONTINUED | OUTPATIENT
Start: 2021-10-24 | End: 2021-10-25 | Stop reason: HOSPADM

## 2021-10-24 RX ORDER — FERROUS SULFATE 325(65) MG
325 TABLET ORAL 2 TIMES DAILY
Qty: 60 TABLET | Refills: 0 | Status: ON HOLD | OUTPATIENT
Start: 2021-10-24 | End: 2022-01-05 | Stop reason: HOSPADM

## 2021-10-24 RX ADMIN — ACETAMINOPHEN 650 MG: 325 TABLET, FILM COATED ORAL at 02:10

## 2021-10-24 RX ADMIN — MUPIROCIN: 20 OINTMENT TOPICAL at 09:10

## 2021-10-24 RX ADMIN — MIDODRINE HYDROCHLORIDE 2.5 MG: 2.5 TABLET ORAL at 09:10

## 2021-10-24 RX ADMIN — FLUTICASONE FUROATE AND VILANTEROL TRIFENATATE 1 PUFF: 100; 25 POWDER RESPIRATORY (INHALATION) at 07:10

## 2021-10-24 RX ADMIN — CHLORHEXIDINE GLUCONATE 15 ML: 1.2 RINSE ORAL at 09:10

## 2021-10-24 RX ADMIN — IPRATROPIUM BROMIDE AND ALBUTEROL SULFATE 3 ML: .5; 3 SOLUTION RESPIRATORY (INHALATION) at 07:10

## 2021-10-24 RX ADMIN — DILTIAZEM HYDROCHLORIDE 180 MG: 180 CAPSULE, COATED, EXTENDED RELEASE ORAL at 09:10

## 2021-10-24 RX ADMIN — ACETAMINOPHEN 650 MG: 325 TABLET, FILM COATED ORAL at 11:10

## 2021-10-24 RX ADMIN — METOPROLOL TARTRATE 12.5 MG: 25 TABLET, FILM COATED ORAL at 09:10

## 2021-10-24 RX ADMIN — PANTOPRAZOLE SODIUM 40 MG: 40 TABLET, DELAYED RELEASE ORAL at 05:10

## 2021-10-24 RX ADMIN — ACETAMINOPHEN 650 MG: 325 TABLET, FILM COATED ORAL at 09:10

## 2021-10-24 RX ADMIN — MIRTAZAPINE 15 MG: 15 TABLET, FILM COATED ORAL at 09:10

## 2021-10-24 RX ADMIN — THERA TABS 1 TABLET: TAB at 09:10

## 2021-10-25 VITALS
TEMPERATURE: 97 F | DIASTOLIC BLOOD PRESSURE: 65 MMHG | BODY MASS INDEX: 19.96 KG/M2 | HEIGHT: 63 IN | HEART RATE: 86 BPM | WEIGHT: 112.63 LBS | RESPIRATION RATE: 17 BRPM | OXYGEN SATURATION: 98 % | SYSTOLIC BLOOD PRESSURE: 144 MMHG

## 2021-10-25 LAB
ERYTHROCYTE [DISTWIDTH] IN BLOOD BY AUTOMATED COUNT: 25 % (ref 11.5–14.5)
HCT VFR BLD AUTO: 25 % (ref 37–48.5)
HCT VFR BLD AUTO: 25 % (ref 37–48.5)
HCT VFR BLD AUTO: 26.4 % (ref 37–48.5)
HCT VFR BLD AUTO: 27.7 % (ref 37–48.5)
HGB BLD-MCNC: 7.7 G/DL (ref 12–16)
HGB BLD-MCNC: 7.8 G/DL (ref 12–16)
HGB BLD-MCNC: 8 G/DL (ref 12–16)
HGB BLD-MCNC: 8.4 G/DL (ref 12–16)
MCH RBC QN AUTO: 29.1 PG (ref 27–31)
MCHC RBC AUTO-ENTMCNC: 30.3 G/DL (ref 32–36)
MCV RBC AUTO: 96 FL (ref 82–98)
PLATELET # BLD AUTO: 279 K/UL (ref 150–450)
PMV BLD AUTO: 9.7 FL (ref 9.2–12.9)
RBC # BLD AUTO: 2.75 M/UL (ref 4–5.4)
WBC # BLD AUTO: 9.7 K/UL (ref 3.9–12.7)

## 2021-10-25 PROCEDURE — 85014 HEMATOCRIT: CPT | Mod: 91 | Performed by: INTERNAL MEDICINE

## 2021-10-25 PROCEDURE — 25000003 PHARM REV CODE 250: Performed by: INTERNAL MEDICINE

## 2021-10-25 PROCEDURE — 36415 COLL VENOUS BLD VENIPUNCTURE: CPT | Performed by: INTERNAL MEDICINE

## 2021-10-25 PROCEDURE — 85014 HEMATOCRIT: CPT | Performed by: INTERNAL MEDICINE

## 2021-10-25 PROCEDURE — 27000221 HC OXYGEN, UP TO 24 HOURS

## 2021-10-25 PROCEDURE — 99900031 HC PATIENT EDUCATION (STAT)

## 2021-10-25 PROCEDURE — 99900035 HC TECH TIME PER 15 MIN (STAT)

## 2021-10-25 PROCEDURE — 85018 HEMOGLOBIN: CPT | Performed by: INTERNAL MEDICINE

## 2021-10-25 PROCEDURE — 94761 N-INVAS EAR/PLS OXIMETRY MLT: CPT

## 2021-10-25 PROCEDURE — 25000003 PHARM REV CODE 250

## 2021-10-25 PROCEDURE — 25000242 PHARM REV CODE 250 ALT 637 W/ HCPCS: Performed by: INTERNAL MEDICINE

## 2021-10-25 PROCEDURE — 85018 HEMOGLOBIN: CPT | Mod: 91 | Performed by: INTERNAL MEDICINE

## 2021-10-25 PROCEDURE — 94640 AIRWAY INHALATION TREATMENT: CPT

## 2021-10-25 PROCEDURE — 85027 COMPLETE CBC AUTOMATED: CPT | Performed by: INTERNAL MEDICINE

## 2021-10-25 RX ADMIN — MUPIROCIN: 20 OINTMENT TOPICAL at 09:10

## 2021-10-25 RX ADMIN — IPRATROPIUM BROMIDE AND ALBUTEROL SULFATE 3 ML: .5; 3 SOLUTION RESPIRATORY (INHALATION) at 09:10

## 2021-10-25 RX ADMIN — PANTOPRAZOLE SODIUM 40 MG: 40 TABLET, DELAYED RELEASE ORAL at 05:10

## 2021-10-25 RX ADMIN — METOPROLOL TARTRATE 12.5 MG: 25 TABLET, FILM COATED ORAL at 09:10

## 2021-10-25 RX ADMIN — MIDODRINE HYDROCHLORIDE 2.5 MG: 2.5 TABLET ORAL at 09:10

## 2021-10-25 RX ADMIN — THERA TABS 1 TABLET: TAB at 09:10

## 2021-10-25 RX ADMIN — FLUTICASONE FUROATE AND VILANTEROL TRIFENATATE 1 PUFF: 100; 25 POWDER RESPIRATORY (INHALATION) at 07:10

## 2021-10-25 RX ADMIN — CHLORHEXIDINE GLUCONATE 15 ML: 1.2 RINSE ORAL at 09:10

## 2021-10-25 RX ADMIN — DILTIAZEM HYDROCHLORIDE 180 MG: 180 CAPSULE, COATED, EXTENDED RELEASE ORAL at 09:10

## 2021-11-02 ENCOUNTER — HOSPITAL ENCOUNTER (INPATIENT)
Facility: HOSPITAL | Age: 76
LOS: 1 days | Discharge: NURSING, REHAB OR LONG-TERM CARE | DRG: 641 | End: 2021-11-03
Attending: EMERGENCY MEDICINE | Admitting: INTERNAL MEDICINE
Payer: MEDICARE

## 2021-11-02 DIAGNOSIS — E87.6 HYPOKALEMIA: Primary | ICD-10-CM

## 2021-11-02 DIAGNOSIS — R07.9 CHEST PAIN: ICD-10-CM

## 2021-11-02 DIAGNOSIS — K92.2 GASTROINTESTINAL BLEED: ICD-10-CM

## 2021-11-02 DIAGNOSIS — K92.2 GASTROINTESTINAL HEMORRHAGE, UNSPECIFIED GASTROINTESTINAL HEMORRHAGE TYPE: ICD-10-CM

## 2021-11-02 PROBLEM — I48.0 PAROXYSMAL ATRIAL FIBRILLATION: Status: ACTIVE | Noted: 2021-10-12

## 2021-11-02 LAB
ABO + RH BLD: NORMAL
ALBUMIN SERPL BCP-MCNC: 3 G/DL (ref 3.5–5.2)
ALP SERPL-CCNC: 81 U/L (ref 55–135)
ALT SERPL W/O P-5'-P-CCNC: 18 U/L (ref 10–44)
ANION GAP SERPL CALC-SCNC: 13 MMOL/L (ref 8–16)
APTT PPP: 24 SEC (ref 23.3–35.1)
AST SERPL-CCNC: 18 U/L (ref 10–40)
BACTERIA #/AREA URNS HPF: ABNORMAL /HPF
BASOPHILS # BLD AUTO: 0.05 K/UL (ref 0–0.2)
BASOPHILS NFR BLD: 0.6 % (ref 0–1.9)
BILIRUB SERPL-MCNC: 0.8 MG/DL (ref 0.1–1)
BILIRUB UR QL STRIP: NEGATIVE
BLD GP AB SCN CELLS X3 SERPL QL: NORMAL
BUN SERPL-MCNC: 21 MG/DL (ref 8–23)
C DIFF GDH STL QL: NEGATIVE
C DIFF TOX A+B STL QL IA: NEGATIVE
CALCIUM SERPL-MCNC: 7.9 MG/DL (ref 8.7–10.5)
CHLORIDE SERPL-SCNC: 102 MMOL/L (ref 95–110)
CK MB SERPL-MCNC: 1.8 NG/ML (ref 0.1–6.5)
CK SERPL-CCNC: 23 U/L (ref 20–180)
CLARITY UR: CLEAR
CO2 SERPL-SCNC: 26 MMOL/L (ref 23–29)
COLOR UR: COLORLESS
CREAT SERPL-MCNC: 1.1 MG/DL (ref 0.5–1.4)
DIFFERENTIAL METHOD: ABNORMAL
EOSINOPHIL # BLD AUTO: 0.2 K/UL (ref 0–0.5)
EOSINOPHIL NFR BLD: 2.7 % (ref 0–8)
ERYTHROCYTE [DISTWIDTH] IN BLOOD BY AUTOMATED COUNT: 23.7 % (ref 11.5–14.5)
EST. GFR  (AFRICAN AMERICAN): 56.8 ML/MIN/1.73 M^2
EST. GFR  (NON AFRICAN AMERICAN): 49.2 ML/MIN/1.73 M^2
GLUCOSE SERPL-MCNC: 89 MG/DL (ref 70–110)
GLUCOSE SERPL-MCNC: 94 MG/DL (ref 70–110)
GLUCOSE UR QL STRIP: NEGATIVE
HCT VFR BLD AUTO: 32.9 % (ref 37–48.5)
HGB BLD-MCNC: 9.5 G/DL (ref 12–16)
HGB UR QL STRIP: ABNORMAL
HYALINE CASTS #/AREA URNS LPF: 1 /LPF
IMM GRANULOCYTES # BLD AUTO: 0.05 K/UL (ref 0–0.04)
IMM GRANULOCYTES NFR BLD AUTO: 0.6 % (ref 0–0.5)
INR PPP: 1.2
KETONES UR QL STRIP: NEGATIVE
LEUKOCYTE ESTERASE UR QL STRIP: NEGATIVE
LYMPHOCYTES # BLD AUTO: 1.9 K/UL (ref 1–4.8)
LYMPHOCYTES NFR BLD: 21.8 % (ref 18–48)
MAGNESIUM SERPL-MCNC: 2 MG/DL (ref 1.6–2.6)
MCH RBC QN AUTO: 29 PG (ref 27–31)
MCHC RBC AUTO-ENTMCNC: 28.9 G/DL (ref 32–36)
MCV RBC AUTO: 100 FL (ref 82–98)
MICROSCOPIC COMMENT: ABNORMAL
MONOCYTES # BLD AUTO: 0.4 K/UL (ref 0.3–1)
MONOCYTES NFR BLD: 4.2 % (ref 4–15)
NEUTROPHILS # BLD AUTO: 6.3 K/UL (ref 1.8–7.7)
NEUTROPHILS NFR BLD: 70.1 % (ref 38–73)
NITRITE UR QL STRIP: NEGATIVE
NRBC BLD-RTO: 0 /100 WBC
OB PNL STL: POSITIVE
PH UR STRIP: 8 [PH] (ref 5–8)
PLATELET # BLD AUTO: 423 K/UL (ref 150–450)
PMV BLD AUTO: 9.1 FL (ref 9.2–12.9)
POTASSIUM SERPL-SCNC: 2.6 MMOL/L (ref 3.5–5.1)
POTASSIUM SERPL-SCNC: 3.8 MMOL/L (ref 3.5–5.1)
PROT SERPL-MCNC: 6.4 G/DL (ref 6–8.4)
PROT UR QL STRIP: NEGATIVE
PROTHROMBIN TIME: 14 SEC (ref 11.4–13.7)
RBC # BLD AUTO: 3.28 M/UL (ref 4–5.4)
RBC #/AREA URNS HPF: 7 /HPF (ref 0–4)
RETICS/RBC NFR AUTO: 7.2 % (ref 0.5–2.5)
SARS-COV-2 RDRP RESP QL NAA+PROBE: NEGATIVE
SODIUM SERPL-SCNC: 141 MMOL/L (ref 136–145)
SP GR UR STRIP: 1.01 (ref 1–1.03)
SQUAMOUS #/AREA URNS HPF: 1 /HPF
TROPONIN I SERPL DL<=0.01 NG/ML-MCNC: 0.03 NG/ML
URN SPEC COLLECT METH UR: ABNORMAL
UROBILINOGEN UR STRIP-ACNC: NEGATIVE EU/DL
WBC # BLD AUTO: 9.07 K/UL (ref 3.9–12.7)
WBC #/AREA URNS HPF: 1 /HPF (ref 0–5)

## 2021-11-02 PROCEDURE — 82550 ASSAY OF CK (CPK): CPT | Performed by: EMERGENCY MEDICINE

## 2021-11-02 PROCEDURE — 99285 EMERGENCY DEPT VISIT HI MDM: CPT | Mod: 25

## 2021-11-02 PROCEDURE — 93005 ELECTROCARDIOGRAM TRACING: CPT | Performed by: INTERNAL MEDICINE

## 2021-11-02 PROCEDURE — 93010 ELECTROCARDIOGRAM REPORT: CPT | Mod: ,,, | Performed by: INTERNAL MEDICINE

## 2021-11-02 PROCEDURE — C9113 INJ PANTOPRAZOLE SODIUM, VIA: HCPCS | Performed by: EMERGENCY MEDICINE

## 2021-11-02 PROCEDURE — 83735 ASSAY OF MAGNESIUM: CPT | Performed by: EMERGENCY MEDICINE

## 2021-11-02 PROCEDURE — 87324 CLOSTRIDIUM AG IA: CPT | Performed by: INTERNAL MEDICINE

## 2021-11-02 PROCEDURE — 85730 THROMBOPLASTIN TIME PARTIAL: CPT | Performed by: EMERGENCY MEDICINE

## 2021-11-02 PROCEDURE — 93010 EKG 12-LEAD: ICD-10-PCS | Mod: ,,, | Performed by: INTERNAL MEDICINE

## 2021-11-02 PROCEDURE — 85610 PROTHROMBIN TIME: CPT | Performed by: EMERGENCY MEDICINE

## 2021-11-02 PROCEDURE — 36415 COLL VENOUS BLD VENIPUNCTURE: CPT | Performed by: EMERGENCY MEDICINE

## 2021-11-02 PROCEDURE — 80053 COMPREHEN METABOLIC PANEL: CPT | Performed by: EMERGENCY MEDICINE

## 2021-11-02 PROCEDURE — 25000003 PHARM REV CODE 250: Performed by: INTERNAL MEDICINE

## 2021-11-02 PROCEDURE — 63600175 PHARM REV CODE 636 W HCPCS: Performed by: EMERGENCY MEDICINE

## 2021-11-02 PROCEDURE — U0002 COVID-19 LAB TEST NON-CDC: HCPCS | Performed by: EMERGENCY MEDICINE

## 2021-11-02 PROCEDURE — 85045 AUTOMATED RETICULOCYTE COUNT: CPT | Performed by: EMERGENCY MEDICINE

## 2021-11-02 PROCEDURE — 81001 URINALYSIS AUTO W/SCOPE: CPT | Performed by: EMERGENCY MEDICINE

## 2021-11-02 PROCEDURE — 12000002 HC ACUTE/MED SURGE SEMI-PRIVATE ROOM

## 2021-11-02 PROCEDURE — 86900 BLOOD TYPING SEROLOGIC ABO: CPT | Performed by: EMERGENCY MEDICINE

## 2021-11-02 PROCEDURE — 82553 CREATINE MB FRACTION: CPT | Performed by: EMERGENCY MEDICINE

## 2021-11-02 PROCEDURE — 84132 ASSAY OF SERUM POTASSIUM: CPT | Mod: 59 | Performed by: INTERNAL MEDICINE

## 2021-11-02 PROCEDURE — 85025 COMPLETE CBC W/AUTO DIFF WBC: CPT | Performed by: EMERGENCY MEDICINE

## 2021-11-02 PROCEDURE — 36415 COLL VENOUS BLD VENIPUNCTURE: CPT | Performed by: INTERNAL MEDICINE

## 2021-11-02 PROCEDURE — 87449 NOS EACH ORGANISM AG IA: CPT | Performed by: INTERNAL MEDICINE

## 2021-11-02 PROCEDURE — 82272 OCCULT BLD FECES 1-3 TESTS: CPT | Performed by: EMERGENCY MEDICINE

## 2021-11-02 PROCEDURE — 84484 ASSAY OF TROPONIN QUANT: CPT | Performed by: EMERGENCY MEDICINE

## 2021-11-02 RX ORDER — PANTOPRAZOLE SODIUM 40 MG/1
40 TABLET, DELAYED RELEASE ORAL DAILY
Status: DISCONTINUED | OUTPATIENT
Start: 2021-11-02 | End: 2021-11-03 | Stop reason: HOSPADM

## 2021-11-02 RX ORDER — ACETAMINOPHEN 325 MG/1
650 TABLET ORAL EVERY 6 HOURS PRN
Status: DISCONTINUED | OUTPATIENT
Start: 2021-11-02 | End: 2021-11-03 | Stop reason: HOSPADM

## 2021-11-02 RX ORDER — LANOLIN ALCOHOL/MO/W.PET/CERES
800 CREAM (GRAM) TOPICAL
Status: DISCONTINUED | OUTPATIENT
Start: 2021-11-02 | End: 2021-11-03 | Stop reason: HOSPADM

## 2021-11-02 RX ORDER — LANOLIN ALCOHOL/MO/W.PET/CERES
1 CREAM (GRAM) TOPICAL DAILY
Status: DISCONTINUED | OUTPATIENT
Start: 2021-11-02 | End: 2021-11-03 | Stop reason: HOSPADM

## 2021-11-02 RX ORDER — NALOXONE HCL 0.4 MG/ML
0.02 VIAL (ML) INJECTION
Status: DISCONTINUED | OUTPATIENT
Start: 2021-11-02 | End: 2021-11-03 | Stop reason: HOSPADM

## 2021-11-02 RX ORDER — CHOLECALCIFEROL (VITAMIN D3) 25 MCG
1000 TABLET ORAL DAILY
COMMUNITY

## 2021-11-02 RX ORDER — TALC
6 POWDER (GRAM) TOPICAL NIGHTLY PRN
Status: DISCONTINUED | OUTPATIENT
Start: 2021-11-02 | End: 2021-11-03 | Stop reason: HOSPADM

## 2021-11-02 RX ORDER — POTASSIUM CHLORIDE 7.45 MG/ML
10 INJECTION INTRAVENOUS ONCE
Status: COMPLETED | OUTPATIENT
Start: 2021-11-02 | End: 2021-11-02

## 2021-11-02 RX ORDER — ALBUTEROL SULFATE 90 UG/1
2 AEROSOL, METERED RESPIRATORY (INHALATION) EVERY 6 HOURS PRN
Status: DISCONTINUED | OUTPATIENT
Start: 2021-11-02 | End: 2021-11-03 | Stop reason: HOSPADM

## 2021-11-02 RX ORDER — POLYETHYLENE GLYCOL 3350 17 G/17G
17 POWDER, FOR SOLUTION ORAL DAILY PRN
Status: DISCONTINUED | OUTPATIENT
Start: 2021-11-02 | End: 2021-11-03 | Stop reason: HOSPADM

## 2021-11-02 RX ORDER — SODIUM,POTASSIUM PHOSPHATES 280-250MG
2 POWDER IN PACKET (EA) ORAL
Status: DISCONTINUED | OUTPATIENT
Start: 2021-11-02 | End: 2021-11-03 | Stop reason: HOSPADM

## 2021-11-02 RX ORDER — METOPROLOL TARTRATE 25 MG/1
12.5 TABLET ORAL 2 TIMES DAILY
Status: DISCONTINUED | OUTPATIENT
Start: 2021-11-02 | End: 2021-11-03 | Stop reason: HOSPADM

## 2021-11-02 RX ORDER — MIRTAZAPINE 15 MG/1
15 TABLET, FILM COATED ORAL NIGHTLY
Status: DISCONTINUED | OUTPATIENT
Start: 2021-11-02 | End: 2021-11-03 | Stop reason: HOSPADM

## 2021-11-02 RX ORDER — ONDANSETRON 4 MG/1
4 TABLET, FILM COATED ORAL EVERY 6 HOURS PRN
COMMUNITY

## 2021-11-02 RX ORDER — MIDODRINE HYDROCHLORIDE 2.5 MG/1
2.5 TABLET ORAL 3 TIMES DAILY
Status: DISCONTINUED | OUTPATIENT
Start: 2021-11-02 | End: 2021-11-03 | Stop reason: HOSPADM

## 2021-11-02 RX ORDER — PANTOPRAZOLE SODIUM 40 MG/10ML
80 INJECTION, POWDER, LYOPHILIZED, FOR SOLUTION INTRAVENOUS
Status: COMPLETED | OUTPATIENT
Start: 2021-11-02 | End: 2021-11-02

## 2021-11-02 RX ORDER — LANOLIN ALCOHOL/MO/W.PET/CERES
1000 CREAM (GRAM) TOPICAL DAILY
Status: DISCONTINUED | OUTPATIENT
Start: 2021-11-02 | End: 2021-11-03 | Stop reason: HOSPADM

## 2021-11-02 RX ORDER — DILTIAZEM HYDROCHLORIDE 180 MG/1
180 CAPSULE, COATED, EXTENDED RELEASE ORAL DAILY
Status: DISCONTINUED | OUTPATIENT
Start: 2021-11-02 | End: 2021-11-03 | Stop reason: HOSPADM

## 2021-11-02 RX ADMIN — PANTOPRAZOLE SODIUM 80 MG: 40 INJECTION, POWDER, LYOPHILIZED, FOR SOLUTION INTRAVENOUS at 09:11

## 2021-11-02 RX ADMIN — FERROUS SULFATE TAB 325 MG (65 MG ELEMENTAL FE) 1 EACH: 325 (65 FE) TAB at 01:11

## 2021-11-02 RX ADMIN — MIRTAZAPINE 15 MG: 15 TABLET, FILM COATED ORAL at 08:11

## 2021-11-02 RX ADMIN — POTASSIUM CHLORIDE 10 MEQ: 7.46 INJECTION, SOLUTION INTRAVENOUS at 09:11

## 2021-11-02 RX ADMIN — PANTOPRAZOLE SODIUM 40 MG: 40 TABLET, DELAYED RELEASE ORAL at 01:11

## 2021-11-02 RX ADMIN — CYANOCOBALAMIN TAB 1000 MCG 1000 MCG: 1000 TAB at 01:11

## 2021-11-02 RX ADMIN — DILTIAZEM HYDROCHLORIDE 180 MG: 180 CAPSULE, COATED, EXTENDED RELEASE ORAL at 01:11

## 2021-11-02 RX ADMIN — MIDODRINE HYDROCHLORIDE 2.5 MG: 2.5 TABLET ORAL at 03:11

## 2021-11-02 RX ADMIN — APIXABAN 2.5 MG: 2.5 TABLET, FILM COATED ORAL at 08:11

## 2021-11-02 RX ADMIN — ACETAMINOPHEN 650 MG: 325 TABLET, FILM COATED ORAL at 11:11

## 2021-11-02 RX ADMIN — ACETAMINOPHEN 650 MG: 325 TABLET, FILM COATED ORAL at 08:11

## 2021-11-02 RX ADMIN — MIDODRINE HYDROCHLORIDE 2.5 MG: 2.5 TABLET ORAL at 08:11

## 2021-11-02 RX ADMIN — METOPROLOL TARTRATE 12.5 MG: 25 TABLET, FILM COATED ORAL at 01:11

## 2021-11-02 RX ADMIN — POTASSIUM BICARBONATE 50 MEQ: 977.5 TABLET, EFFERVESCENT ORAL at 09:11

## 2021-11-03 VITALS
HEART RATE: 88 BPM | RESPIRATION RATE: 20 BRPM | DIASTOLIC BLOOD PRESSURE: 70 MMHG | TEMPERATURE: 97 F | BODY MASS INDEX: 19.84 KG/M2 | OXYGEN SATURATION: 95 % | HEIGHT: 63 IN | WEIGHT: 112 LBS | SYSTOLIC BLOOD PRESSURE: 110 MMHG

## 2021-11-03 PROBLEM — E87.6 HYPOKALEMIA: Status: RESOLVED | Noted: 2021-09-08 | Resolved: 2021-11-03

## 2021-11-03 LAB
ANION GAP SERPL CALC-SCNC: 11 MMOL/L (ref 8–16)
BUN SERPL-MCNC: 16 MG/DL (ref 8–23)
CALCIUM SERPL-MCNC: 7.7 MG/DL (ref 8.7–10.5)
CHLORIDE SERPL-SCNC: 100 MMOL/L (ref 95–110)
CO2 SERPL-SCNC: 28 MMOL/L (ref 23–29)
CREAT SERPL-MCNC: 1 MG/DL (ref 0.5–1.4)
ERYTHROCYTE [DISTWIDTH] IN BLOOD BY AUTOMATED COUNT: 22.5 % (ref 11.5–14.5)
EST. GFR  (AFRICAN AMERICAN): >60 ML/MIN/1.73 M^2
EST. GFR  (NON AFRICAN AMERICAN): 55.2 ML/MIN/1.73 M^2
GLUCOSE SERPL-MCNC: 95 MG/DL (ref 70–110)
HCT VFR BLD AUTO: 29.9 % (ref 37–48.5)
HGB BLD-MCNC: 8.9 G/DL (ref 12–16)
MCH RBC QN AUTO: 29.1 PG (ref 27–31)
MCHC RBC AUTO-ENTMCNC: 29.8 G/DL (ref 32–36)
MCV RBC AUTO: 98 FL (ref 82–98)
PLATELET # BLD AUTO: 372 K/UL (ref 150–450)
PMV BLD AUTO: 9.1 FL (ref 9.2–12.9)
POTASSIUM SERPL-SCNC: 3.1 MMOL/L (ref 3.5–5.1)
RBC # BLD AUTO: 3.06 M/UL (ref 4–5.4)
SODIUM SERPL-SCNC: 139 MMOL/L (ref 136–145)
WBC # BLD AUTO: 8.77 K/UL (ref 3.9–12.7)

## 2021-11-03 PROCEDURE — 99900035 HC TECH TIME PER 15 MIN (STAT)

## 2021-11-03 PROCEDURE — 85027 COMPLETE CBC AUTOMATED: CPT | Performed by: INTERNAL MEDICINE

## 2021-11-03 PROCEDURE — 94799 UNLISTED PULMONARY SVC/PX: CPT

## 2021-11-03 PROCEDURE — 80048 BASIC METABOLIC PNL TOTAL CA: CPT | Performed by: INTERNAL MEDICINE

## 2021-11-03 PROCEDURE — 36415 COLL VENOUS BLD VENIPUNCTURE: CPT | Performed by: INTERNAL MEDICINE

## 2021-11-03 PROCEDURE — 25000003 PHARM REV CODE 250: Performed by: INTERNAL MEDICINE

## 2021-11-03 PROCEDURE — 27000221 HC OXYGEN, UP TO 24 HOURS

## 2021-11-03 PROCEDURE — 99900031 HC PATIENT EDUCATION (STAT)

## 2021-11-03 RX ORDER — FUROSEMIDE 20 MG/1
20 TABLET ORAL DAILY
Qty: 60 TABLET | Refills: 0 | Status: ON HOLD
Start: 2021-11-03 | End: 2022-01-05

## 2021-11-03 RX ADMIN — DILTIAZEM HYDROCHLORIDE 180 MG: 180 CAPSULE, COATED, EXTENDED RELEASE ORAL at 09:11

## 2021-11-03 RX ADMIN — FERROUS SULFATE TAB 325 MG (65 MG ELEMENTAL FE) 1 EACH: 325 (65 FE) TAB at 09:11

## 2021-11-03 RX ADMIN — APIXABAN 2.5 MG: 2.5 TABLET, FILM COATED ORAL at 09:11

## 2021-11-03 RX ADMIN — METOPROLOL TARTRATE 12.5 MG: 25 TABLET, FILM COATED ORAL at 09:11

## 2021-11-03 RX ADMIN — MIDODRINE HYDROCHLORIDE 2.5 MG: 2.5 TABLET ORAL at 02:11

## 2021-11-03 RX ADMIN — MIDODRINE HYDROCHLORIDE 2.5 MG: 2.5 TABLET ORAL at 09:11

## 2021-11-03 RX ADMIN — POTASSIUM BICARBONATE 50 MEQ: 977.5 TABLET, EFFERVESCENT ORAL at 02:11

## 2021-11-03 RX ADMIN — PANTOPRAZOLE SODIUM 40 MG: 40 TABLET, DELAYED RELEASE ORAL at 05:11

## 2021-11-03 RX ADMIN — CYANOCOBALAMIN TAB 1000 MCG 1000 MCG: 1000 TAB at 09:11

## 2021-11-17 ENCOUNTER — TELEPHONE (OUTPATIENT)
Dept: FAMILY MEDICINE | Facility: CLINIC | Age: 76
End: 2021-11-17
Payer: MEDICARE

## 2021-11-30 ENCOUNTER — OFFICE VISIT (OUTPATIENT)
Dept: PULMONOLOGY | Facility: CLINIC | Age: 76
End: 2021-11-30
Payer: MEDICARE

## 2021-11-30 VITALS — SYSTOLIC BLOOD PRESSURE: 112 MMHG | BODY MASS INDEX: 19.66 KG/M2 | DIASTOLIC BLOOD PRESSURE: 62 MMHG | WEIGHT: 111 LBS

## 2021-11-30 DIAGNOSIS — F41.9 ANXIETY: ICD-10-CM

## 2021-11-30 DIAGNOSIS — J44.9 CHRONIC OBSTRUCTIVE PULMONARY DISEASE, UNSPECIFIED COPD TYPE: Chronic | ICD-10-CM

## 2021-11-30 DIAGNOSIS — I27.20 PULMONARY HYPERTENSION: Chronic | ICD-10-CM

## 2021-11-30 DIAGNOSIS — J96.11 CHRONIC RESPIRATORY FAILURE WITH HYPOXIA: Chronic | ICD-10-CM

## 2021-11-30 PROCEDURE — 99214 OFFICE O/P EST MOD 30 MIN: CPT | Mod: S$GLB,,, | Performed by: INTERNAL MEDICINE

## 2021-11-30 PROCEDURE — 99214 PR OFFICE/OUTPT VISIT, EST, LEVL IV, 30-39 MIN: ICD-10-PCS | Mod: S$GLB,,, | Performed by: INTERNAL MEDICINE

## 2021-12-01 ENCOUNTER — OFFICE VISIT (OUTPATIENT)
Dept: CARDIOLOGY | Facility: CLINIC | Age: 76
End: 2021-12-01
Payer: MEDICARE

## 2021-12-01 VITALS
RESPIRATION RATE: 16 BRPM | HEART RATE: 71 BPM | HEIGHT: 63 IN | DIASTOLIC BLOOD PRESSURE: 70 MMHG | SYSTOLIC BLOOD PRESSURE: 123 MMHG | BODY MASS INDEX: 19.67 KG/M2 | WEIGHT: 111 LBS | OXYGEN SATURATION: 90 %

## 2021-12-01 DIAGNOSIS — C85.90 LYMPHOMA, UNSPECIFIED BODY REGION, UNSPECIFIED LYMPHOMA TYPE: ICD-10-CM

## 2021-12-01 DIAGNOSIS — I27.20 PULMONARY HYPERTENSION: Chronic | ICD-10-CM

## 2021-12-01 DIAGNOSIS — I51.89 DIASTOLIC DYSFUNCTION: Chronic | ICD-10-CM

## 2021-12-01 DIAGNOSIS — I50.32 CHRONIC DIASTOLIC HEART FAILURE: Chronic | ICD-10-CM

## 2021-12-01 DIAGNOSIS — J44.9 CHRONIC OBSTRUCTIVE PULMONARY DISEASE, UNSPECIFIED COPD TYPE: Primary | Chronic | ICD-10-CM

## 2021-12-01 DIAGNOSIS — I38 VALVULAR HEART DISEASE: Chronic | ICD-10-CM

## 2021-12-01 DIAGNOSIS — I48.0 PAROXYSMAL ATRIAL FIBRILLATION: ICD-10-CM

## 2021-12-01 DIAGNOSIS — J96.11 CHRONIC RESPIRATORY FAILURE WITH HYPOXIA: Chronic | ICD-10-CM

## 2021-12-01 DIAGNOSIS — I47.19 MULTIFOCAL ATRIAL TACHYCARDIA: ICD-10-CM

## 2021-12-01 PROCEDURE — 99214 OFFICE O/P EST MOD 30 MIN: CPT | Mod: S$GLB,,, | Performed by: INTERNAL MEDICINE

## 2021-12-01 PROCEDURE — 99214 PR OFFICE/OUTPT VISIT, EST, LEVL IV, 30-39 MIN: ICD-10-PCS | Mod: S$GLB,,, | Performed by: INTERNAL MEDICINE

## 2021-12-01 RX ORDER — MIDODRINE HYDROCHLORIDE 5 MG/1
5 TABLET ORAL 3 TIMES DAILY
COMMUNITY
Start: 2021-11-29

## 2021-12-08 ENCOUNTER — TELEPHONE (OUTPATIENT)
Dept: CARDIOLOGY | Facility: CLINIC | Age: 76
End: 2021-12-08
Payer: MEDICARE

## 2021-12-10 ENCOUNTER — HOSPITAL ENCOUNTER (OUTPATIENT)
Dept: CARDIOLOGY | Facility: CLINIC | Age: 76
Discharge: HOME | End: 2021-12-10
Attending: INTERNAL MEDICINE
Payer: MEDICARE

## 2021-12-10 DIAGNOSIS — I47.19 MULTIFOCAL ATRIAL TACHYCARDIA: ICD-10-CM

## 2021-12-10 DIAGNOSIS — J44.9 CHRONIC OBSTRUCTIVE PULMONARY DISEASE, UNSPECIFIED COPD TYPE: ICD-10-CM

## 2021-12-10 DIAGNOSIS — I27.20 PULMONARY HYPERTENSION: Chronic | ICD-10-CM

## 2021-12-10 DIAGNOSIS — I38 VALVULAR HEART DISEASE: Chronic | ICD-10-CM

## 2021-12-10 DIAGNOSIS — I50.32 CHRONIC DIASTOLIC HEART FAILURE: ICD-10-CM

## 2021-12-10 DIAGNOSIS — I38 VALVULAR HEART DISEASE: ICD-10-CM

## 2021-12-10 DIAGNOSIS — I27.20 PULMONARY HYPERTENSION: ICD-10-CM

## 2021-12-10 PROCEDURE — 93224 HOLTER MONITOR - 24 HOUR (CUPID ONLY): ICD-10-PCS | Mod: S$GLB,,, | Performed by: INTERNAL MEDICINE

## 2021-12-10 PROCEDURE — 93224 XTRNL ECG REC UP TO 48 HRS: CPT | Mod: S$GLB,,, | Performed by: INTERNAL MEDICINE

## 2021-12-16 LAB
OHS CV EVENT MONITOR DAY: 0
OHS CV HOLTER LENGTH DECIMAL HOURS: 24
OHS CV HOLTER LENGTH HOURS: 24
OHS CV HOLTER LENGTH MINUTES: 0
OHS CV HOLTER SINUS AVERAGE HR: 93
OHS CV HOLTER SINUS MAX HR: 189
OHS CV HOLTER SINUS MIN HR: 57

## 2021-12-26 ENCOUNTER — HOSPITAL ENCOUNTER (INPATIENT)
Facility: HOSPITAL | Age: 76
LOS: 10 days | Discharge: HOSPICE | DRG: 291 | End: 2022-01-05
Attending: EMERGENCY MEDICINE | Admitting: HOSPITALIST
Payer: MEDICARE

## 2021-12-26 DIAGNOSIS — J18.9 BILATERAL PNEUMONIA: ICD-10-CM

## 2021-12-26 DIAGNOSIS — K62.5 RECTAL BLEEDING: ICD-10-CM

## 2021-12-26 DIAGNOSIS — K63.89 COLONIC MASS: ICD-10-CM

## 2021-12-26 DIAGNOSIS — C18.9 COLON CANCER: ICD-10-CM

## 2021-12-26 DIAGNOSIS — J96.00 ACUTE RESPIRATORY FAILURE, UNSPECIFIED WHETHER WITH HYPOXIA OR HYPERCAPNIA: Primary | ICD-10-CM

## 2021-12-26 DIAGNOSIS — I48.0 PAROXYSMAL ATRIAL FIBRILLATION: ICD-10-CM

## 2021-12-26 DIAGNOSIS — J96.22 ACUTE ON CHRONIC RESPIRATORY FAILURE WITH HYPOXIA AND HYPERCAPNIA: ICD-10-CM

## 2021-12-26 DIAGNOSIS — J44.9 CHRONIC OBSTRUCTIVE PULMONARY DISEASE, UNSPECIFIED COPD TYPE: Chronic | ICD-10-CM

## 2021-12-26 DIAGNOSIS — R00.0 TACHYCARDIA: ICD-10-CM

## 2021-12-26 DIAGNOSIS — J96.21 ACUTE ON CHRONIC RESPIRATORY FAILURE WITH HYPOXIA AND HYPERCAPNIA: ICD-10-CM

## 2021-12-26 DIAGNOSIS — R91.8 PULMONARY NODULES: ICD-10-CM

## 2021-12-26 DIAGNOSIS — I50.33 ACUTE ON CHRONIC DIASTOLIC CONGESTIVE HEART FAILURE: ICD-10-CM

## 2021-12-26 LAB
ALBUMIN SERPL BCP-MCNC: 2.8 G/DL (ref 3.5–5.2)
ALLENS TEST: ABNORMAL
ALP SERPL-CCNC: 81 U/L (ref 55–135)
ALT SERPL W/O P-5'-P-CCNC: 12 U/L (ref 10–44)
AMORPH CRY URNS QL MICRO: ABNORMAL
ANION GAP SERPL CALC-SCNC: 13 MMOL/L (ref 8–16)
AST SERPL-CCNC: 13 U/L (ref 10–40)
BACTERIA #/AREA URNS HPF: ABNORMAL /HPF
BASOPHILS # BLD AUTO: 0.07 K/UL (ref 0–0.2)
BASOPHILS NFR BLD: 0.5 % (ref 0–1.9)
BILIRUB SERPL-MCNC: 0.4 MG/DL (ref 0.1–1)
BILIRUB UR QL STRIP: NEGATIVE
BNP SERPL-MCNC: 1540 PG/ML (ref 0–99)
BUN SERPL-MCNC: 21 MG/DL (ref 8–23)
CALCIUM SERPL-MCNC: 9.5 MG/DL (ref 8.7–10.5)
CHLORIDE SERPL-SCNC: 99 MMOL/L (ref 95–110)
CLARITY UR: CLEAR
CO2 SERPL-SCNC: 28 MMOL/L (ref 23–29)
COLOR UR: YELLOW
CREAT SERPL-MCNC: 1.4 MG/DL (ref 0.5–1.4)
DELSYS: ABNORMAL
DIFFERENTIAL METHOD: ABNORMAL
EOSINOPHIL # BLD AUTO: 0.2 K/UL (ref 0–0.5)
EOSINOPHIL NFR BLD: 1.6 % (ref 0–8)
EP: 6
ERYTHROCYTE [DISTWIDTH] IN BLOOD BY AUTOMATED COUNT: 15.7 % (ref 11.5–14.5)
ERYTHROCYTE [SEDIMENTATION RATE] IN BLOOD BY WESTERGREN METHOD: 10 MM/H
EST. GFR  (AFRICAN AMERICAN): 42 ML/MIN/1.73 M^2
EST. GFR  (NON AFRICAN AMERICAN): 37 ML/MIN/1.73 M^2
FIO2: 50
GLUCOSE SERPL-MCNC: 240 MG/DL (ref 70–110)
GLUCOSE UR QL STRIP: NEGATIVE
HCO3 UR-SCNC: 34.5 MMOL/L (ref 24–28)
HCT VFR BLD AUTO: 32.6 % (ref 37–48.5)
HGB BLD-MCNC: 9.1 G/DL (ref 12–16)
HGB UR QL STRIP: NEGATIVE
HYALINE CASTS #/AREA URNS LPF: 0 /LPF
IMM GRANULOCYTES # BLD AUTO: 0.04 K/UL (ref 0–0.04)
IMM GRANULOCYTES NFR BLD AUTO: 0.3 % (ref 0–0.5)
INR PPP: 1 (ref 0.8–1.2)
IP: 14
KETONES UR QL STRIP: NEGATIVE
LACTATE SERPL-SCNC: 1.5 MMOL/L (ref 0.5–2.2)
LACTATE SERPL-SCNC: 2.4 MMOL/L (ref 0.5–2.2)
LEUKOCYTE ESTERASE UR QL STRIP: NEGATIVE
LIPASE SERPL-CCNC: 24 U/L (ref 4–60)
LYMPHOCYTES # BLD AUTO: 4.9 K/UL (ref 1–4.8)
LYMPHOCYTES NFR BLD: 38.2 % (ref 18–48)
MAGNESIUM SERPL-MCNC: 2 MG/DL (ref 1.6–2.6)
MCH RBC QN AUTO: 28.1 PG (ref 27–31)
MCHC RBC AUTO-ENTMCNC: 27.9 G/DL (ref 32–36)
MCV RBC AUTO: 101 FL (ref 82–98)
MICROSCOPIC COMMENT: ABNORMAL
MIN VOL: 9
MODE: ABNORMAL
MONOCYTES # BLD AUTO: 0.4 K/UL (ref 0.3–1)
MONOCYTES NFR BLD: 3.3 % (ref 4–15)
NEUTROPHILS # BLD AUTO: 7.2 K/UL (ref 1.8–7.7)
NEUTROPHILS NFR BLD: 56.1 % (ref 38–73)
NITRITE UR QL STRIP: NEGATIVE
NRBC BLD-RTO: 0 /100 WBC
PCO2 BLDA: 81.8 MMHG (ref 35–45)
PH SMN: 7.23 [PH] (ref 7.35–7.45)
PH UR STRIP: 6 [PH] (ref 5–8)
PLATELET # BLD AUTO: 499 K/UL (ref 150–450)
PMV BLD AUTO: 9.5 FL (ref 9.2–12.9)
PO2 BLDA: 72 MMHG (ref 80–100)
POC BE: 7 MMOL/L
POC SATURATED O2: 90 % (ref 95–100)
POC TCO2: 37 MMOL/L (ref 23–27)
POCT GLUCOSE: 288 MG/DL (ref 70–110)
POTASSIUM SERPL-SCNC: 4.5 MMOL/L (ref 3.5–5.1)
PROT SERPL-MCNC: 6.6 G/DL (ref 6–8.4)
PROT UR QL STRIP: ABNORMAL
PROTHROMBIN TIME: 10.9 SEC (ref 9–12.5)
RBC # BLD AUTO: 3.24 M/UL (ref 4–5.4)
RBC #/AREA URNS HPF: 2 /HPF (ref 0–4)
SAMPLE: ABNORMAL
SARS-COV-2 RDRP RESP QL NAA+PROBE: NEGATIVE
SITE: ABNORMAL
SODIUM SERPL-SCNC: 140 MMOL/L (ref 136–145)
SP GR UR STRIP: >=1.03 (ref 1–1.03)
SP02: 92
SPONT RATE: 35
SQUAMOUS #/AREA URNS HPF: 3 /HPF
TROPONIN I SERPL DL<=0.01 NG/ML-MCNC: 0.02 NG/ML (ref 0–0.03)
URN SPEC COLLECT METH UR: ABNORMAL
UROBILINOGEN UR STRIP-ACNC: 1 EU/DL
WBC # BLD AUTO: 12.87 K/UL (ref 3.9–12.7)
WBC #/AREA URNS HPF: 6 /HPF (ref 0–5)

## 2021-12-26 PROCEDURE — 85025 COMPLETE CBC W/AUTO DIFF WBC: CPT | Performed by: EMERGENCY MEDICINE

## 2021-12-26 PROCEDURE — 25000242 PHARM REV CODE 250 ALT 637 W/ HCPCS: Performed by: EMERGENCY MEDICINE

## 2021-12-26 PROCEDURE — 27000221 HC OXYGEN, UP TO 24 HOURS

## 2021-12-26 PROCEDURE — U0002 COVID-19 LAB TEST NON-CDC: HCPCS | Performed by: EMERGENCY MEDICINE

## 2021-12-26 PROCEDURE — 25000003 PHARM REV CODE 250: Performed by: EMERGENCY MEDICINE

## 2021-12-26 PROCEDURE — 36415 COLL VENOUS BLD VENIPUNCTURE: CPT | Performed by: NURSE PRACTITIONER

## 2021-12-26 PROCEDURE — 20000000 HC ICU ROOM

## 2021-12-26 PROCEDURE — 82803 BLOOD GASES ANY COMBINATION: CPT

## 2021-12-26 PROCEDURE — 87077 CULTURE AEROBIC IDENTIFY: CPT | Performed by: EMERGENCY MEDICINE

## 2021-12-26 PROCEDURE — 83735 ASSAY OF MAGNESIUM: CPT | Performed by: EMERGENCY MEDICINE

## 2021-12-26 PROCEDURE — 84484 ASSAY OF TROPONIN QUANT: CPT | Performed by: EMERGENCY MEDICINE

## 2021-12-26 PROCEDURE — 99900035 HC TECH TIME PER 15 MIN (STAT)

## 2021-12-26 PROCEDURE — 83605 ASSAY OF LACTIC ACID: CPT | Mod: 91 | Performed by: EMERGENCY MEDICINE

## 2021-12-26 PROCEDURE — 36600 WITHDRAWAL OF ARTERIAL BLOOD: CPT

## 2021-12-26 PROCEDURE — 93005 ELECTROCARDIOGRAM TRACING: CPT

## 2021-12-26 PROCEDURE — 87040 BLOOD CULTURE FOR BACTERIA: CPT | Mod: 59 | Performed by: EMERGENCY MEDICINE

## 2021-12-26 PROCEDURE — 27000190 HC CPAP FULL FACE MASK W/VALVE

## 2021-12-26 PROCEDURE — 83690 ASSAY OF LIPASE: CPT | Performed by: EMERGENCY MEDICINE

## 2021-12-26 PROCEDURE — 82962 GLUCOSE BLOOD TEST: CPT | Mod: 59

## 2021-12-26 PROCEDURE — 87186 SC STD MICRODIL/AGAR DIL: CPT | Performed by: EMERGENCY MEDICINE

## 2021-12-26 PROCEDURE — 94644 CONT INHLJ TX 1ST HOUR: CPT

## 2021-12-26 PROCEDURE — 99291 CRITICAL CARE FIRST HOUR: CPT | Mod: 25

## 2021-12-26 PROCEDURE — 94660 CPAP INITIATION&MGMT: CPT

## 2021-12-26 PROCEDURE — 83880 ASSAY OF NATRIURETIC PEPTIDE: CPT | Performed by: NURSE PRACTITIONER

## 2021-12-26 PROCEDURE — 63600175 PHARM REV CODE 636 W HCPCS: Performed by: EMERGENCY MEDICINE

## 2021-12-26 PROCEDURE — 80053 COMPREHEN METABOLIC PANEL: CPT | Performed by: EMERGENCY MEDICINE

## 2021-12-26 PROCEDURE — 96374 THER/PROPH/DIAG INJ IV PUSH: CPT

## 2021-12-26 PROCEDURE — 81000 URINALYSIS NONAUTO W/SCOPE: CPT | Performed by: EMERGENCY MEDICINE

## 2021-12-26 PROCEDURE — 36415 COLL VENOUS BLD VENIPUNCTURE: CPT | Performed by: EMERGENCY MEDICINE

## 2021-12-26 PROCEDURE — 85610 PROTHROMBIN TIME: CPT | Performed by: EMERGENCY MEDICINE

## 2021-12-26 RX ORDER — IPRATROPIUM BROMIDE AND ALBUTEROL SULFATE 2.5; .5 MG/3ML; MG/3ML
3 SOLUTION RESPIRATORY (INHALATION) EVERY 6 HOURS
Status: DISCONTINUED | OUTPATIENT
Start: 2021-12-27 | End: 2022-01-05 | Stop reason: HOSPADM

## 2021-12-26 RX ORDER — SODIUM CHLORIDE 0.9 % (FLUSH) 0.9 %
10 SYRINGE (ML) INJECTION
Status: DISCONTINUED | OUTPATIENT
Start: 2021-12-26 | End: 2022-01-05 | Stop reason: HOSPADM

## 2021-12-26 RX ORDER — MIDODRINE HYDROCHLORIDE 2.5 MG/1
5 TABLET ORAL 3 TIMES DAILY
Status: DISCONTINUED | OUTPATIENT
Start: 2021-12-26 | End: 2022-01-05 | Stop reason: HOSPADM

## 2021-12-26 RX ORDER — VANCOMYCIN HCL IN 5 % DEXTROSE 1G/250ML
1000 PLASTIC BAG, INJECTION (ML) INTRAVENOUS
Status: CANCELLED | OUTPATIENT
Start: 2021-12-26 | End: 2021-12-26

## 2021-12-26 RX ORDER — PANTOPRAZOLE SODIUM 40 MG/10ML
40 INJECTION, POWDER, LYOPHILIZED, FOR SOLUTION INTRAVENOUS DAILY
Status: DISCONTINUED | OUTPATIENT
Start: 2021-12-27 | End: 2022-01-05 | Stop reason: HOSPADM

## 2021-12-26 RX ORDER — DILTIAZEM HYDROCHLORIDE 180 MG/1
180 CAPSULE, COATED, EXTENDED RELEASE ORAL DAILY
Status: DISCONTINUED | OUTPATIENT
Start: 2021-12-27 | End: 2021-12-27

## 2021-12-26 RX ORDER — FUROSEMIDE 20 MG/1
20 TABLET ORAL DAILY
Status: DISCONTINUED | OUTPATIENT
Start: 2021-12-27 | End: 2021-12-27

## 2021-12-26 RX ORDER — LANOLIN ALCOHOL/MO/W.PET/CERES
1 CREAM (GRAM) TOPICAL DAILY
Status: DISCONTINUED | OUTPATIENT
Start: 2021-12-27 | End: 2021-12-27

## 2021-12-26 RX ORDER — SODIUM CHLORIDE 9 MG/ML
INJECTION, SOLUTION INTRAVENOUS CONTINUOUS
Status: DISCONTINUED | OUTPATIENT
Start: 2021-12-26 | End: 2021-12-27

## 2021-12-26 RX ORDER — CHOLECALCIFEROL (VITAMIN D3) 25 MCG
1000 TABLET ORAL DAILY
Status: DISCONTINUED | OUTPATIENT
Start: 2021-12-27 | End: 2022-01-05 | Stop reason: HOSPADM

## 2021-12-26 RX ORDER — ALBUTEROL SULFATE 2.5 MG/.5ML
10 SOLUTION RESPIRATORY (INHALATION)
Status: COMPLETED | OUTPATIENT
Start: 2021-12-26 | End: 2021-12-26

## 2021-12-26 RX ORDER — VANCOMYCIN HCL IN 5 % DEXTROSE 1G/250ML
20 PLASTIC BAG, INJECTION (ML) INTRAVENOUS ONCE
Status: COMPLETED | OUTPATIENT
Start: 2021-12-26 | End: 2021-12-26

## 2021-12-26 RX ORDER — POTASSIUM CHLORIDE 750 MG/1
10 TABLET, EXTENDED RELEASE ORAL DAILY
Status: DISCONTINUED | OUTPATIENT
Start: 2021-12-27 | End: 2021-12-31

## 2021-12-26 RX ORDER — ONDANSETRON 2 MG/ML
8 INJECTION INTRAMUSCULAR; INTRAVENOUS EVERY 6 HOURS PRN
Status: DISCONTINUED | OUTPATIENT
Start: 2021-12-26 | End: 2022-01-05 | Stop reason: HOSPADM

## 2021-12-26 RX ORDER — MUPIROCIN 20 MG/G
OINTMENT TOPICAL 2 TIMES DAILY
Status: COMPLETED | OUTPATIENT
Start: 2021-12-27 | End: 2021-12-31

## 2021-12-26 RX ORDER — MIRTAZAPINE 15 MG/1
15 TABLET, FILM COATED ORAL NIGHTLY
Status: DISCONTINUED | OUTPATIENT
Start: 2021-12-26 | End: 2022-01-05 | Stop reason: HOSPADM

## 2021-12-26 RX ORDER — FUROSEMIDE 10 MG/ML
40 INJECTION INTRAMUSCULAR; INTRAVENOUS ONCE
Status: DISCONTINUED | OUTPATIENT
Start: 2021-12-27 | End: 2021-12-27

## 2021-12-26 RX ORDER — MAGNESIUM SULFATE HEPTAHYDRATE 40 MG/ML
2 INJECTION, SOLUTION INTRAVENOUS
Status: COMPLETED | OUTPATIENT
Start: 2021-12-26 | End: 2021-12-26

## 2021-12-26 RX ORDER — METOPROLOL TARTRATE 25 MG/1
25 TABLET, FILM COATED ORAL 2 TIMES DAILY
Status: DISCONTINUED | OUTPATIENT
Start: 2021-12-26 | End: 2021-12-27

## 2021-12-26 RX ORDER — LANOLIN ALCOHOL/MO/W.PET/CERES
1000 CREAM (GRAM) TOPICAL DAILY
Status: DISCONTINUED | OUTPATIENT
Start: 2021-12-27 | End: 2022-01-05 | Stop reason: HOSPADM

## 2021-12-26 RX ORDER — ACETAMINOPHEN 325 MG/1
650 TABLET ORAL EVERY 8 HOURS PRN
Status: DISCONTINUED | OUTPATIENT
Start: 2021-12-26 | End: 2022-01-01

## 2021-12-26 RX ADMIN — VANCOMYCIN HYDROCHLORIDE 1000 MG: 1 INJECTION, POWDER, LYOPHILIZED, FOR SOLUTION INTRAVENOUS at 09:12

## 2021-12-26 RX ADMIN — ALBUTEROL SULFATE 10 MG: 2.5 SOLUTION RESPIRATORY (INHALATION) at 06:12

## 2021-12-26 RX ADMIN — SODIUM CHLORIDE, SODIUM LACTATE, POTASSIUM CHLORIDE, AND CALCIUM CHLORIDE 1000 ML: .6; .31; .03; .02 INJECTION, SOLUTION INTRAVENOUS at 07:12

## 2021-12-26 RX ADMIN — MAGNESIUM SULFATE 2 G: 2 INJECTION INTRAVENOUS at 07:12

## 2021-12-27 LAB
ALBUMIN SERPL BCP-MCNC: 2.5 G/DL (ref 3.5–5.2)
ALP SERPL-CCNC: 71 U/L (ref 55–135)
ALT SERPL W/O P-5'-P-CCNC: 10 U/L (ref 10–44)
ANION GAP SERPL CALC-SCNC: 14 MMOL/L (ref 8–16)
AST SERPL-CCNC: 12 U/L (ref 10–40)
BASOPHILS # BLD AUTO: 0 K/UL (ref 0–0.2)
BASOPHILS # BLD AUTO: 0 K/UL (ref 0–0.2)
BASOPHILS NFR BLD: 0 % (ref 0–1.9)
BASOPHILS NFR BLD: 0 % (ref 0–1.9)
BILIRUB SERPL-MCNC: 0.3 MG/DL (ref 0.1–1)
BUN SERPL-MCNC: 22 MG/DL (ref 8–23)
CALCIUM SERPL-MCNC: 9 MG/DL (ref 8.7–10.5)
CHLORIDE SERPL-SCNC: 102 MMOL/L (ref 95–110)
CO2 SERPL-SCNC: 25 MMOL/L (ref 23–29)
CREAT SERPL-MCNC: 1.2 MG/DL (ref 0.5–1.4)
DIFFERENTIAL METHOD: ABNORMAL
DIFFERENTIAL METHOD: ABNORMAL
EOSINOPHIL # BLD AUTO: 0 K/UL (ref 0–0.5)
EOSINOPHIL # BLD AUTO: 0 K/UL (ref 0–0.5)
EOSINOPHIL NFR BLD: 0 % (ref 0–8)
EOSINOPHIL NFR BLD: 0 % (ref 0–8)
ERYTHROCYTE [DISTWIDTH] IN BLOOD BY AUTOMATED COUNT: 15.5 % (ref 11.5–14.5)
ERYTHROCYTE [DISTWIDTH] IN BLOOD BY AUTOMATED COUNT: 15.5 % (ref 11.5–14.5)
EST. GFR  (AFRICAN AMERICAN): 51 ML/MIN/1.73 M^2
EST. GFR  (NON AFRICAN AMERICAN): 44 ML/MIN/1.73 M^2
GLUCOSE SERPL-MCNC: 205 MG/DL (ref 70–110)
HCT VFR BLD AUTO: 27.2 % (ref 37–48.5)
HCT VFR BLD AUTO: 28.5 % (ref 37–48.5)
HGB BLD-MCNC: 7.6 G/DL (ref 12–16)
HGB BLD-MCNC: 8 G/DL (ref 12–16)
IMM GRANULOCYTES # BLD AUTO: 0.06 K/UL (ref 0–0.04)
IMM GRANULOCYTES # BLD AUTO: 0.06 K/UL (ref 0–0.04)
IMM GRANULOCYTES NFR BLD AUTO: 0.6 % (ref 0–0.5)
IMM GRANULOCYTES NFR BLD AUTO: 0.8 % (ref 0–0.5)
LYMPHOCYTES # BLD AUTO: 0.4 K/UL (ref 1–4.8)
LYMPHOCYTES # BLD AUTO: 0.5 K/UL (ref 1–4.8)
LYMPHOCYTES NFR BLD: 4.8 % (ref 18–48)
LYMPHOCYTES NFR BLD: 4.9 % (ref 18–48)
MAGNESIUM SERPL-MCNC: 2.6 MG/DL (ref 1.6–2.6)
MCH RBC QN AUTO: 27.6 PG (ref 27–31)
MCH RBC QN AUTO: 27.8 PG (ref 27–31)
MCHC RBC AUTO-ENTMCNC: 27.9 G/DL (ref 32–36)
MCHC RBC AUTO-ENTMCNC: 28.1 G/DL (ref 32–36)
MCV RBC AUTO: 99 FL (ref 82–98)
MCV RBC AUTO: 99 FL (ref 82–98)
MONOCYTES # BLD AUTO: 0.1 K/UL (ref 0.3–1)
MONOCYTES # BLD AUTO: 0.2 K/UL (ref 0.3–1)
MONOCYTES NFR BLD: 1.4 % (ref 4–15)
MONOCYTES NFR BLD: 1.9 % (ref 4–15)
NEUTROPHILS # BLD AUTO: 7.4 K/UL (ref 1.8–7.7)
NEUTROPHILS # BLD AUTO: 9.2 K/UL (ref 1.8–7.7)
NEUTROPHILS NFR BLD: 92.6 % (ref 38–73)
NEUTROPHILS NFR BLD: 93 % (ref 38–73)
NRBC BLD-RTO: 0 /100 WBC
NRBC BLD-RTO: 0 /100 WBC
PHOSPHATE SERPL-MCNC: 4 MG/DL (ref 2.7–4.5)
PLATELET # BLD AUTO: 368 K/UL (ref 150–450)
PLATELET # BLD AUTO: 372 K/UL (ref 150–450)
PMV BLD AUTO: 9.5 FL (ref 9.2–12.9)
PMV BLD AUTO: 9.5 FL (ref 9.2–12.9)
POTASSIUM SERPL-SCNC: 4.3 MMOL/L (ref 3.5–5.1)
PROCALCITONIN SERPL IA-MCNC: 1.47 NG/ML
PROT SERPL-MCNC: 5.8 G/DL (ref 6–8.4)
RBC # BLD AUTO: 2.75 M/UL (ref 4–5.4)
RBC # BLD AUTO: 2.88 M/UL (ref 4–5.4)
SODIUM SERPL-SCNC: 141 MMOL/L (ref 136–145)
VANCOMYCIN SERPL-MCNC: 12.1 UG/ML
WBC # BLD AUTO: 7.99 K/UL (ref 3.9–12.7)
WBC # BLD AUTO: 9.88 K/UL (ref 3.9–12.7)

## 2021-12-27 PROCEDURE — 25000242 PHARM REV CODE 250 ALT 637 W/ HCPCS: Performed by: STUDENT IN AN ORGANIZED HEALTH CARE EDUCATION/TRAINING PROGRAM

## 2021-12-27 PROCEDURE — 99222 1ST HOSP IP/OBS MODERATE 55: CPT | Mod: ,,, | Performed by: INTERNAL MEDICINE

## 2021-12-27 PROCEDURE — 84100 ASSAY OF PHOSPHORUS: CPT | Performed by: NURSE PRACTITIONER

## 2021-12-27 PROCEDURE — 25000003 PHARM REV CODE 250: Performed by: SPECIALIST

## 2021-12-27 PROCEDURE — 93005 ELECTROCARDIOGRAM TRACING: CPT

## 2021-12-27 PROCEDURE — 94640 AIRWAY INHALATION TREATMENT: CPT

## 2021-12-27 PROCEDURE — 25000003 PHARM REV CODE 250: Performed by: HOSPITALIST

## 2021-12-27 PROCEDURE — 63600175 PHARM REV CODE 636 W HCPCS: Performed by: EMERGENCY MEDICINE

## 2021-12-27 PROCEDURE — 99900035 HC TECH TIME PER 15 MIN (STAT)

## 2021-12-27 PROCEDURE — 99291 CRITICAL CARE FIRST HOUR: CPT | Mod: ,,, | Performed by: INTERNAL MEDICINE

## 2021-12-27 PROCEDURE — 85025 COMPLETE CBC W/AUTO DIFF WBC: CPT | Mod: 91 | Performed by: STUDENT IN AN ORGANIZED HEALTH CARE EDUCATION/TRAINING PROGRAM

## 2021-12-27 PROCEDURE — 36415 COLL VENOUS BLD VENIPUNCTURE: CPT | Performed by: STUDENT IN AN ORGANIZED HEALTH CARE EDUCATION/TRAINING PROGRAM

## 2021-12-27 PROCEDURE — 83735 ASSAY OF MAGNESIUM: CPT | Performed by: NURSE PRACTITIONER

## 2021-12-27 PROCEDURE — 63600175 PHARM REV CODE 636 W HCPCS: Performed by: NURSE PRACTITIONER

## 2021-12-27 PROCEDURE — 63600175 PHARM REV CODE 636 W HCPCS: Performed by: SPECIALIST

## 2021-12-27 PROCEDURE — C9113 INJ PANTOPRAZOLE SODIUM, VIA: HCPCS | Performed by: NURSE PRACTITIONER

## 2021-12-27 PROCEDURE — 25000003 PHARM REV CODE 250: Performed by: NURSE PRACTITIONER

## 2021-12-27 PROCEDURE — 25000003 PHARM REV CODE 250: Performed by: STUDENT IN AN ORGANIZED HEALTH CARE EDUCATION/TRAINING PROGRAM

## 2021-12-27 PROCEDURE — 99222 PR INITIAL HOSPITAL CARE,LEVL II: ICD-10-PCS | Mod: ,,, | Performed by: INTERNAL MEDICINE

## 2021-12-27 PROCEDURE — 99291 PR CRITICAL CARE, E/M 30-74 MINUTES: ICD-10-PCS | Mod: ,,, | Performed by: INTERNAL MEDICINE

## 2021-12-27 PROCEDURE — 36415 COLL VENOUS BLD VENIPUNCTURE: CPT | Performed by: NURSE PRACTITIONER

## 2021-12-27 PROCEDURE — 94760 N-INVAS EAR/PLS OXIMETRY 1: CPT

## 2021-12-27 PROCEDURE — 20000000 HC ICU ROOM

## 2021-12-27 PROCEDURE — 99223 1ST HOSP IP/OBS HIGH 75: CPT | Mod: ,,, | Performed by: SPECIALIST

## 2021-12-27 PROCEDURE — 94660 CPAP INITIATION&MGMT: CPT

## 2021-12-27 PROCEDURE — 25000003 PHARM REV CODE 250: Performed by: EMERGENCY MEDICINE

## 2021-12-27 PROCEDURE — 36415 COLL VENOUS BLD VENIPUNCTURE: CPT | Performed by: EMERGENCY MEDICINE

## 2021-12-27 PROCEDURE — 84145 PROCALCITONIN (PCT): CPT | Performed by: STUDENT IN AN ORGANIZED HEALTH CARE EDUCATION/TRAINING PROGRAM

## 2021-12-27 PROCEDURE — 25000242 PHARM REV CODE 250 ALT 637 W/ HCPCS: Performed by: EMERGENCY MEDICINE

## 2021-12-27 PROCEDURE — 80202 ASSAY OF VANCOMYCIN: CPT | Performed by: EMERGENCY MEDICINE

## 2021-12-27 PROCEDURE — 27000221 HC OXYGEN, UP TO 24 HOURS

## 2021-12-27 PROCEDURE — 99223 PR INITIAL HOSPITAL CARE,LEVL III: ICD-10-PCS | Mod: ,,, | Performed by: SPECIALIST

## 2021-12-27 PROCEDURE — 94761 N-INVAS EAR/PLS OXIMETRY MLT: CPT

## 2021-12-27 PROCEDURE — 80053 COMPREHEN METABOLIC PANEL: CPT | Performed by: NURSE PRACTITIONER

## 2021-12-27 PROCEDURE — 85025 COMPLETE CBC W/AUTO DIFF WBC: CPT | Performed by: NURSE PRACTITIONER

## 2021-12-27 PROCEDURE — 63600175 PHARM REV CODE 636 W HCPCS: Performed by: STUDENT IN AN ORGANIZED HEALTH CARE EDUCATION/TRAINING PROGRAM

## 2021-12-27 RX ORDER — POLYETHYLENE GLYCOL 3350 17 G/17G
17 POWDER, FOR SOLUTION ORAL DAILY
Status: DISCONTINUED | OUTPATIENT
Start: 2021-12-27 | End: 2022-01-05 | Stop reason: HOSPADM

## 2021-12-27 RX ORDER — FUROSEMIDE 10 MG/ML
40 INJECTION INTRAMUSCULAR; INTRAVENOUS
Status: DISCONTINUED | OUTPATIENT
Start: 2021-12-27 | End: 2021-12-29

## 2021-12-27 RX ORDER — SODIUM CHLORIDE 9 MG/ML
INJECTION, SOLUTION INTRAVENOUS CONTINUOUS
Status: DISCONTINUED | OUTPATIENT
Start: 2021-12-27 | End: 2022-01-05 | Stop reason: HOSPADM

## 2021-12-27 RX ORDER — LIDOCAINE 50 MG/G
1 PATCH TOPICAL
Status: DISCONTINUED | OUTPATIENT
Start: 2021-12-27 | End: 2022-01-05 | Stop reason: HOSPADM

## 2021-12-27 RX ORDER — LANOLIN ALCOHOL/MO/W.PET/CERES
1 CREAM (GRAM) TOPICAL
Status: DISCONTINUED | OUTPATIENT
Start: 2021-12-29 | End: 2022-01-05 | Stop reason: HOSPADM

## 2021-12-27 RX ORDER — LORAZEPAM 2 MG/ML
0.5 INJECTION INTRAMUSCULAR
Status: DISCONTINUED | OUTPATIENT
Start: 2021-12-27 | End: 2022-01-05 | Stop reason: HOSPADM

## 2021-12-27 RX ORDER — FLUTICASONE FUROATE AND VILANTEROL 100; 25 UG/1; UG/1
1 POWDER RESPIRATORY (INHALATION) DAILY
Status: DISCONTINUED | OUTPATIENT
Start: 2021-12-27 | End: 2022-01-05 | Stop reason: HOSPADM

## 2021-12-27 RX ORDER — DIPHENHYDRAMINE HYDROCHLORIDE 50 MG/ML
12.5 INJECTION INTRAMUSCULAR; INTRAVENOUS EVERY 4 HOURS PRN
Status: DISCONTINUED | OUTPATIENT
Start: 2021-12-27 | End: 2022-01-01

## 2021-12-27 RX ORDER — DIGOXIN 0.25 MG/ML
375 INJECTION INTRAMUSCULAR; INTRAVENOUS ONCE
Status: COMPLETED | OUTPATIENT
Start: 2021-12-27 | End: 2021-12-27

## 2021-12-27 RX ORDER — DIGOXIN 125 MCG
0.12 TABLET ORAL DAILY
Status: DISCONTINUED | OUTPATIENT
Start: 2021-12-27 | End: 2021-12-30

## 2021-12-27 RX ORDER — DILTIAZEM HCL/D5W 125 MG/125
15 PLASTIC BAG, INJECTION (ML) INTRAVENOUS CONTINUOUS
Status: DISCONTINUED | OUTPATIENT
Start: 2021-12-27 | End: 2021-12-28

## 2021-12-27 RX ORDER — DILTIAZEM HYDROCHLORIDE 5 MG/ML
10 INJECTION INTRAVENOUS ONCE
Status: COMPLETED | OUTPATIENT
Start: 2021-12-27 | End: 2021-12-27

## 2021-12-27 RX ADMIN — MIRTAZAPINE 15 MG: 15 TABLET, FILM COATED ORAL at 09:12

## 2021-12-27 RX ADMIN — FUROSEMIDE 20 MG: 20 TABLET ORAL at 08:12

## 2021-12-27 RX ADMIN — SODIUM CHLORIDE: 0.9 INJECTION, SOLUTION INTRAVENOUS at 12:12

## 2021-12-27 RX ADMIN — FUROSEMIDE 40 MG: 10 INJECTION, SOLUTION INTRAMUSCULAR; INTRAVENOUS at 09:12

## 2021-12-27 RX ADMIN — DIGOXIN 0.12 MG: 125 TABLET ORAL at 09:12

## 2021-12-27 RX ADMIN — IPRATROPIUM BROMIDE AND ALBUTEROL SULFATE 3 ML: 2.5; .5 SOLUTION RESPIRATORY (INHALATION) at 06:12

## 2021-12-27 RX ADMIN — THERA TABS 1 TABLET: TAB at 08:12

## 2021-12-27 RX ADMIN — IPRATROPIUM BROMIDE AND ALBUTEROL SULFATE 3 ML: 2.5; .5 SOLUTION RESPIRATORY (INHALATION) at 01:12

## 2021-12-27 RX ADMIN — PANTOPRAZOLE SODIUM 40 MG: 40 INJECTION, POWDER, LYOPHILIZED, FOR SOLUTION INTRAVENOUS at 08:12

## 2021-12-27 RX ADMIN — DIGOXIN 375 MCG: 0.25 INJECTION INTRAMUSCULAR; INTRAVENOUS at 12:12

## 2021-12-27 RX ADMIN — FLUTICASONE FUROATE AND VILANTEROL TRIFENATATE 1 PUFF: 100; 25 POWDER RESPIRATORY (INHALATION) at 10:12

## 2021-12-27 RX ADMIN — DILTIAZEM HYDROCHLORIDE 15 MG/HR: 5 INJECTION INTRAVENOUS at 01:12

## 2021-12-27 RX ADMIN — APIXABAN 2.5 MG: 2.5 TABLET, FILM COATED ORAL at 09:12

## 2021-12-27 RX ADMIN — MUPIROCIN: 20 OINTMENT TOPICAL at 08:12

## 2021-12-27 RX ADMIN — DILTIAZEM HYDROCHLORIDE 10 MG: 5 INJECTION INTRAVENOUS at 01:12

## 2021-12-27 RX ADMIN — LORAZEPAM 0.5 MG: 2 INJECTION INTRAMUSCULAR; INTRAVENOUS at 09:12

## 2021-12-27 RX ADMIN — PIPERACILLIN AND TAZOBACTAM 4.5 G: 4; .5 INJECTION, POWDER, LYOPHILIZED, FOR SOLUTION INTRAVENOUS; PARENTERAL at 12:12

## 2021-12-27 RX ADMIN — METOPROLOL TARTRATE 25 MG: 25 TABLET, FILM COATED ORAL at 08:12

## 2021-12-27 RX ADMIN — MIDODRINE HYDROCHLORIDE 5 MG: 2.5 TABLET ORAL at 09:12

## 2021-12-27 RX ADMIN — FERROUS SULFATE TAB EC 325 MG (65 MG FE EQUIVALENT) 1 EACH: 325 (65 FE) TABLET DELAYED RESPONSE at 08:12

## 2021-12-27 RX ADMIN — DIPHENHYDRAMINE HYDROCHLORIDE 12.5 MG: 50 INJECTION INTRAMUSCULAR; INTRAVENOUS at 02:12

## 2021-12-27 RX ADMIN — DILTIAZEM HYDROCHLORIDE 180 MG: 180 CAPSULE, COATED, EXTENDED RELEASE ORAL at 08:12

## 2021-12-27 RX ADMIN — Medication 1000 UNITS: at 08:12

## 2021-12-27 RX ADMIN — PIPERACILLIN AND TAZOBACTAM 4.5 G: 4; .5 INJECTION, POWDER, LYOPHILIZED, FOR SOLUTION INTRAVENOUS; PARENTERAL at 08:12

## 2021-12-27 RX ADMIN — MUPIROCIN: 20 OINTMENT TOPICAL at 09:12

## 2021-12-27 RX ADMIN — MIDODRINE HYDROCHLORIDE 5 MG: 2.5 TABLET ORAL at 08:12

## 2021-12-27 RX ADMIN — IPRATROPIUM BROMIDE AND ALBUTEROL SULFATE 3 ML: 2.5; .5 SOLUTION RESPIRATORY (INHALATION) at 07:12

## 2021-12-27 RX ADMIN — CYANOCOBALAMIN TAB 1000 MCG 1000 MCG: 1000 TAB at 08:12

## 2021-12-27 RX ADMIN — PIPERACILLIN AND TAZOBACTAM 4.5 G: 4; .5 INJECTION, POWDER, LYOPHILIZED, FOR SOLUTION INTRAVENOUS; PARENTERAL at 04:12

## 2021-12-27 RX ADMIN — MIDODRINE HYDROCHLORIDE 5 MG: 2.5 TABLET ORAL at 02:12

## 2021-12-27 RX ADMIN — POTASSIUM CHLORIDE 10 MEQ: 750 TABLET, EXTENDED RELEASE ORAL at 09:12

## 2021-12-27 RX ADMIN — LIDOCAINE 5% 1 PATCH: 700 PATCH TOPICAL at 02:12

## 2021-12-27 RX ADMIN — APIXABAN 2.5 MG: 2.5 TABLET, FILM COATED ORAL at 08:12

## 2021-12-27 RX ADMIN — DILTIAZEM HYDROCHLORIDE 15 MG/HR: 5 INJECTION INTRAVENOUS at 10:12

## 2021-12-27 NOTE — ASSESSMENT & PLAN NOTE
Patient's anemia is currently controlled. Has not received any PRBCs to date.. Etiology likely d/t chronic disease  Current CBC reviewed-   Lab Results   Component Value Date    HGB 9.1 (L) 12/26/2021    HCT 32.6 (L) 12/26/2021     Monitor serial CBC and transfuse if patient becomes hemodynamically unstable, symptomatic or H/H drops below 7/21.

## 2021-12-27 NOTE — CONSULTS
[facility]  Cardiology  Consult Note    Patient Name: Patrizia Valadez  MRN: [id number]  Admission Date: 12/26/2021  Hospital Length of Stay: 1 days  Code Status: DNR   Attending Provider: Alex Cunningham MD   Consulting Provider: Zeke Pineda MD  Primary Care Physician: Anya Farr NP  Principal Problem:Bilateral pneumonia    Patient information was obtained from patient, past medical records and ER records.     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     Current Outpatient Medications on File Prior to Encounter   Medication Sig    acetaminophen (TYLENOL) 325 MG tablet Take 650 mg by mouth every 6 (six) hours as needed for Pain.    albuterol (VENTOLIN HFA) 90 mcg/actuation inhaler Inhale 2 puffs into the lungs every 6 (six) hours as needed for Wheezing. Rescue     alendronate (FOSAMAX) 70 MG tablet Take 0.5 tablets (35 mg total) by mouth every 7 days.    ALPRAZolam (XANAX) 0.5 MG tablet Take 1 tablet (0.5 mg total) by mouth 2 (two) times daily as needed for Anxiety.    apixaban (ELIQUIS) 2.5 mg Tab Take 1 tablet (2.5 mg total) by mouth 2 (two) times daily.    CYANOCOBALAMIN, VITAMIN B-12, ORAL Take 1 tablet by mouth once daily.    diltiaZEM (CARDIZEM CD) 180 MG 24 hr capsule Take 1 capsule (180 mg total) by mouth once daily.    ferrous sulfate (IRON) 325 mg (65 mg iron) Tab tablet Take 1 tablet (325 mg total) by mouth 2 (two) times daily.    fluticasone-umeclidin-vilanter (TRELEGY ELLIPTA) 100-62.5-25 mcg DsDv Inhale 1 puff into the lungs once daily.    furosemide (LASIX) 20 MG tablet Take 1 tablet (20 mg total) by mouth once daily.    metoprolol tartrate (LOPRESSOR) 25 MG tablet Take 1 tablet (25 mg total) by mouth 2 (two) times daily.    midodrine (PROAMATINE) 5 MG Tab Take 5 mg by mouth 3 (three) times daily.    mirtazapine (REMERON) 15 MG tablet Take 1 tablet (15 mg total) by mouth every evening.    multivitamin with minerals tablet Take 1 tablet by mouth once daily.    omega 3-dha-epa-fish oil 1,000 mg (120 mg-180 mg) Cap Take 1 capsule by mouth once daily.    ondansetron (ZOFRAN) 4 MG tablet Take 4 mg by mouth every 6 (six) hours as needed for Nausea.    pantoprazole (PROTONIX) 40 MG tablet Take 1 tablet (40 mg total) by mouth once daily.    polyethylene glycol (GLYCOLAX) 17 gram/dose powder Take 17 g by mouth once daily.    potassium chloride (KLOR-CON) 10 MEQ TbSR Take 1 tablet (10 mEq total) by mouth once daily.    vitamin D (VITAMIN D3) 1000 units Tab Take 1,000 Units by mouth once daily.     Family History     Problem Relation (Age of Onset)    Hypertension Mother        Tobacco Use    Smoking status: Former Smoker     Packs/day: 0.25     Years: 60.00     Pack years: 15.00     Types: Cigarettes     Quit date: 8/18/2012     Years since quitting:  9.3    Smokeless tobacco: Never Used    Tobacco comment: Ex smoker 2011   Substance and Sexual Activity    Alcohol use: Not Currently    Drug use: Not on file    Sexual activity: Not on file     ROS  Objective:     Vital Signs (Most Recent):  Temp: 99.32 °F (37.4 °C) (12/27/21 1030)  Pulse: 96 (12/27/21 1030)  Resp: (!) 26 (12/27/21 1030)  BP: 130/75 (12/27/21 0800)  SpO2: 100 % (12/27/21 1030) Vital Signs (24h Range):  Temp:  [95.3 °F (35.2 °C)-99.32 °F (37.4 °C)] 99.32 °F (37.4 °C)  Pulse:  [] 96  Resp:  [22-41] 26  SpO2:  [93 %-100 %] 100 %  BP: ()/(50-75) 130/75     Weight: 48.1 kg (106 lb 0.7 oz)  Body mass index is 18.78 kg/m².    SpO2: 100 %  O2 Device (Oxygen Therapy): BiPAP      Intake/Output Summary (Last 24 hours) at 12/27/2021 1222  Last data filed at 12/27/2021 0952  Gross per 24 hour   Intake 1894.75 ml   Output 320 ml   Net 1574.75 ml       Lines/Drains/Airways     Drain                 Urethral Catheter 12/26/21 2050 16 Fr. <1 day          Peripheral Intravenous Line                 Peripheral IV - Single Lumen 12/26/21 20 G Right Forearm 1 day                Physical Exam pulse is 140   lungs diminished breath sounds rhonchi bilaterally   Cardiac irregular regular   abdomen soft   extremities thin no edema    Significant Labs:   CMP   Recent Labs   Lab 12/26/21 1902 12/27/21  0340    141   K 4.5 4.3   CL 99 102   CO2 28 25   * 205*   BUN 21 22   CREATININE 1.4 1.2   CALCIUM 9.5 9.0   PROT 6.6 5.8*   ALBUMIN 2.8* 2.5*   BILITOT 0.4 0.3   ALKPHOS 81 71   AST 13 12   ALT 12 10   ANIONGAP 13 14   ESTGFRAFRICA 42* 51*   EGFRNONAA 37* 44*   , CBC   Recent Labs   Lab 12/26/21 1902 12/26/21 1902 12/27/21  0340   WBC 12.87*  --  7.99   HGB 9.1*  --  7.6*   HCT 32.6*   < > 27.2*   *  --  372    < > = values in this interval not displayed.    and Troponin   Recent Labs   Lab 12/26/21  1902   TROPONINI 0.016       Significant Imaging:   Assessment and Plan:    paroxysmal  atrial fibrillation   heart failure preserved ejection fraction as well as pulmonary hypertension and severe COPD and pneumonia     plan- Lanoxin IV and continue Lasix IV    Treat atrial fibrillation with rate control; if repeat H&H is less than 7.6 she will need blood transfusion  Active Diagnoses:    Diagnosis Date Noted POA    PRINCIPAL PROBLEM:  Bilateral pneumonia [J18.9] 12/26/2021 Yes    Chronic anemia [D64.9] 10/22/2021 Yes    Chronic hypotension on midodrine [I95.9] 10/22/2021 Yes     Chronic    Congestive heart failure [I50.9] 10/14/2021 Yes    Paroxysmal atrial fibrillation [I48.0] 10/12/2021 Yes    Acute on chronic respiratory failure with hypoxia and hypercapnia [J96.21, J96.22] 08/03/2021 Yes    COPD (chronic obstructive pulmonary disease) [J44.9] 08/03/2021 Yes     Chronic      Problems Resolved During this Admission:       VTE Risk Mitigation (From admission, onward)         Ordered     Place sequential compression device  Until discontinued         12/27/21 0302     apixaban tablet 2.5 mg  2 times daily         12/26/21 2002                Thank you for your consult.     Zeke Pineda MD  Cardiology   [facility]

## 2021-12-27 NOTE — HPI
Patrizia Valadez is a 75-year-old female with a past medical history of COPD, chronic respiratory failure on oxygen at home, lymphoma, and past surgical history of cholecystectomy who presented to the ED with a complaint of shortness of breath. HPI is limited due to acuity of condition. She was sent from a nursing some for decreased mental status as well as low oxygen saturation and respiratory distress. Per ED notes, EMS found the patient to have a pulse ox of 79% on arrival. She received a duoneb treatment, albuterol treatment and solumedrol 125 mg prior to arrival.     Upon arrival to the ED, the patient was found to be altered and confused, and in respiratory distress. She was placed on a non re breather mask, which improved her oxygenation to 98% but she did require non invasive ventilation. She was placed on BiPAP at 50%, 14/6, which has improved her respiratory status. She is more alert and awake, and is able to answer simple questions appropriately. Chest xray revealed bilateral pneumonia along with chronic findings. CBC revealed leukocytosis of 12.87 and what appears to be chronic anemia. Initial lactic acid level was elevated at 2.4, but has decreased to 1.5. Troponin negative, urine negative. BNP elevated at 1540. She was given one dose of Lasix 40 mg IV. Due to continued use of BiPAP and high acuity, she will be admitted to the ICU. The patient does state she is a DNR, review of medical record does show past recent admissions as a DNR. She does not have paper work with her today. Hospital Medicine consulted for admission and further management.

## 2021-12-27 NOTE — ASSESSMENT & PLAN NOTE
Patient with Long standing persistent (>12 months) atrial fibrillation which is controlled currently with Calcium Channel Blocker. Patient is currently in sinus rhythm.[id number] Score: 3. Anticoagulation indicated. Anticoagulation done with eliquis.

## 2021-12-27 NOTE — PLAN OF CARE
[facility]  Initial Discharge Assessment       Primary Care Provider: Anya Farr NP    Admission Diagnosis: Tachycardia [R00.0]  Bilateral pneumonia [J18.9]  Acute respiratory failure, unspecified whether with hypoxia or hypercapnia [J96.00]    Admission Date: 12/26/2021  Expected Discharge Date:      Assessment completed with pts daughter Aracelis Carmichael [phone]- she confirmed that the pt was SNF at [facility] while she was awaiting her medicaid to become active however now she is [facility] care at [facility] and lives there. She will return there at the time of discharge  . If anything needs to be mailed to her it can be mailed to Aracelis's home address on the face sheet. CM following.     Discharge Barriers Identified: None    Payor: MEDICARE / Plan: MEDICARE PART A & B / Product Type: Government /     Extended Emergency Contact Information  Primary Emergency Contact: Ana Carmichael  Work Phone: [phone]  Mobile Phone: [phone]  Relation: Daughter  Preferred language: English   needed? No    Discharge Plan A: Return to nursing home  Discharge Plan B: Home with family      [id number] [place], [place] - [address]  [address]  [place] [address]  Phone: [phone] Fax: [phone]    [id number] - [place], [place] - [address] & [facility]  [address]   [facility] [address]  Phone: [phone] Fax: [phone]    [facility]  [address]  [place] [address]  Phone: [phone] Fax: [phone]      Initial Assessment (most recent)     Adult Discharge Assessment - 12/27/21 1057        Discharge Assessment    Assessment Type Discharge Planning Assessment     Confirmed/corrected address, phone number and insurance Yes     Confirmed Demographics Correct on Facesheet     Source of Information patient;family     Communicated VAZQUEZ with patient/caregiver Yes     Reason For Admission  tachycardia     Lives With facility resident     Facility Arrived From: [facility]     Do you expect to return to your current living situation? Yes     Do you have help at home or someone to help you manage your care at home? Yes     Who are your caregiver(s) and their phone number(s)? [facility]     Prior to hospitilization cognitive status: Alert/Oriented     Current cognitive status: Alert/Oriented     Home Layout Able to live on 1st floor     Equipment Currently Used at Home hospital bed     Readmission within 30 days? No     Patient currently being followed by outpatient case management? No     Do you currently have service(s) that help you manage your care at home? No     Do you take prescription medications? Yes     Do you have prescription coverage? Yes     Do you have any problems affording any of your prescribed medications? No     Is the patient taking medications as prescribed? yes     Who is going to help you get home at discharge? [facility] staff     How do you get to doctors appointments? agency     Are you on dialysis? No     Do you take coumadin? No     Discharge Plan A Return to nursing home     Discharge Plan B Home with family     DME Needed Upon Discharge  none     Discharge Plan discussed with: Adult children     Discharge Barriers Identified None

## 2021-12-27 NOTE — CONSULTS
2021      Admit Date: 2021  Patrizia Valadez  New Patient Consult    Chief Complaint   Patient presents with    Shortness of Breath       History of Present Illness:  Pt is a 74 yo female with lymphoma, COPD, chronic resp failure presenting to the hospital with altered mental status, acute on chronic respiratory failure and picture consistent with CHF vs pneumonia. She is requiring BIPAP for respiratory distress.  Pt resides at [facility]. She has had multiple hospital admissions this year, last in 2021 at [facility] for GI bleed. She follows w/ Dr. Posada in clinic- last visit 21 and takes Breo inhaler.  Pt and daughter at bedside give history. She was home for [employer] and ate well (salty meal) but didn't seem quite well, with lethargy and unable to walk. After going back to nursing home she had pain of her back then felt suddenly unable to breathe so asked them to call 911. She feels improved now but still desaturates and has difficulty breathing when bipap removed.    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    Respiratory failure, chronic      Past Surgical History:   Procedure Laterality Date    CHOLECYSTECTOMY      ESOPHAGOGASTRODUODENOSCOPY N/A 10/23/2021    Procedure: EGD (ESOPHAGOGASTRODUODENOSCOPY);  Surgeon: Jaja Hoover MD;  Location[facility];  Service: Endoscopy;  Laterality: N/A;    HYSTERECTOMY      NO PAST SURGERIES       Social History     Tobacco Use    Smoking status: Former Smoker     Packs/day: 0.25     Years: 60.00     Pack years: 15.00     Types: Cigarettes     Quit date: 2012     Years since quittin.3    Smokeless tobacco: Never Used    Tobacco comment: Ex smoker    Substance Use Topics    Alcohol use: Not Currently     Family History   Problem Relation Age of Onset    Hypertension Mother      Review of  "patient's allergies indicates:   Allergen Reactions    Xanax [alprazolam] Hallucinations    Hydromorphone      Nausea^  Nausea^         Performance Status:Performance Status:The patient's activity level is mobility with asit devices.    Review of Systems:  a review of eleven systems covering constitutional, Psych, Eye, HEENT, Respiratory, Cardiac, GI, , Musculoskeletal, Endocrine, Dermatologicwas negative except the above mentioned abnormalities and for any pertinent findings as listed below:  All negative with pertinent positives as above          Exam:Comprehensive exam done. /75   Pulse 101   Temp 98.6 °F (37 °C) (Rectal)   Resp (!) 26   Ht 5' 3" (1.6 m)   Wt 48.1 kg (106 lb 0.7 oz)   SpO2 98%   Breastfeeding No   BMI 18.78 kg/m²   Exam included Vitals as listed, and patient's appearance and affect and alertness and mood, oral exam for yeast and hygiene and pharynx lesions and Mallapatti (M) score, neck with inspection for jvd and masses and thyroid abnormalities and lymph nodes (supraclavicular and infraclavicular nodes also examined and noted if abn), chest exam included symmetry and effort and fremitus and percussion and auscultation, cardiac exam included rhythm and gallops and murmur and rubs and jvd and edema, abdominal exam for mass and hepatosplenomegaly and tenderness and hernias and bowel sounds, Musculoskeletal exam with muscle tone and posture and mobility/gait and  strenght, and skin for rashes and cyanosis and pallor and turgor, extremity for clubbing.  Findings were normal except as listed below:  Awake, alert, on bipap, no distress  Bilateral diminished breath sounds   HR irregularly irreg, tachycardic  Abdomen soft, nontender  No edema      Radiographs reviewed: view by direct vision   CXR 12/26- bilat effusions, bilat basilar airspace disease with pulmonary vascular congestion, new compared to prior film 11/2021.  CT chest 8/3/21- emphysematous changes, hyperinflation, mild " ggos in the bases    TTE 9/9/21-   · The estimated PA systolic pressure is 46 mmHg.  · Concentric remodeling and normal systolic function.  · The estimated ejection fraction is 55%.  · Normal left ventricular diastolic function.  · Normal right ventricular size with normal right ventricular systolic function.  · Moderate mitral regurgitation.  · Moderate to severe tricuspid regurgitation.      Labs     Recent Labs   Lab 12/27/21  0340   WBC 7.99   HGB 7.6*   HCT 27.2*        Recent Labs   Lab 12/26/21  1902 12/26/21  1902 12/26/21 2235 12/27/21  0340      < >  --  141   K 4.5   < >  --  4.3   CL 99   < >  --  102   CO2 28   < >  --  25   BUN 21   < >  --  22   CREATININE 1.4   < >  --  1.2   *   < >  --  205*   CALCIUM 9.5   < >  --  9.0   MG 2.0   < >  --  2.6   PHOS  --   --   --  4.0   AST 13   < >  --  12   ALT 12   < >  --  10   ALKPHOS 81   < >  --  71   BILITOT 0.4   < >  --  0.3   PROT 6.6   < >  --  5.8*   ALBUMIN 2.8*   < >  --  2.5*   LIPASE 24  --   --   --    INR 1.0  --   --   --    LACTATE 2.4*   < > 1.5  --    TROPONINI 0.016  --   --   --    BNP 1,540*  --   --   --     < > = values in this interval not displayed.     Recent Labs   Lab 12/26/21 2007   PH 7.233*   PCO2 81.8*   PO2 72*   HCO3 34.5*     Microbiology Results (last 7 days)     Procedure Component Value Units Date/Time    Blood culture x two cultures. Draw prior to antibiotics. [[id number]] Collected: 12/26/21 1902    Order Status: Sent Specimen: Blood from Peripheral, Right Updated: 12/27/21 0243    Blood culture x two cultures. Draw prior to antibiotics. [[id number]] Collected: 12/26/21 1910    Order Status: Sent Specimen: Blood Updated: 12/27/21 0243          Impression:  Active Hospital Problems    Diagnosis  POA    *Bilateral pneumonia [J18.9]  Yes    Chronic anemia [D64.9]  Yes    Chronic hypotension on midodrine [I95.9]  Yes     Chronic    Congestive heart failure [I50.9]  Yes    Paroxysmal atrial  fibrillation [I48.0]  Yes    Acute on chronic respiratory failure with hypoxia and hypercapnia [J96.21, J96.22]  Yes    COPD (chronic obstructive pulmonary disease) [J44.9]  Yes     Chronic      Resolved Hospital Problems   No resolved problems to display.               Plan:     - continue bipap  - diurese as tolerated  - continue breo inhaler  - continue nebulized bronchodilators  - picture more consistent with CHF due to dietary salt intake than pneumonia; deescalate antibiotics when improved  - cardiology consult  - monitor Hgb; transfuse as needed to maintain >7  - d/w daughter at bedside  - pt confirms DNR code status    The following were evaluated and adjusted as needed: mechanical ventilator settings and weaning status, antibiotics, acid base balance and oxygenation needs, input and output and renal status and CODE STATUS/OUTLOOK DISCUSSED WITH AVAILABLE NEXT OF  KIN       Critical Care  - THE PATIENT HAS A HIGH PROBABILITY OF IMMINENT OR LIFE THREATENING DETERIORATION.  Over 50%time of encounter was in direct care at bedside.  Time was 30 to 74 minutes required for patient care.  Time needed for all of the above totaled 31 minutes.    Miranda Cash MD  Pulmonary & Critical Care Medicine

## 2021-12-27 NOTE — NURSING
"Patient on bedpan. Moderate amount fresh blood noted from rectum. Patient states that she has this sometimes because she has "ulcers".  Dr. Cunningham here now.  "

## 2021-12-27 NOTE — NURSING
Patient went into afib rate 110s. Confirmed with EKG. Consulted Dr. Pineda. Rate now 150s. Dr. Pineda at bedside. BP stable. Paitient denies distress. Bipap in use for O2 management.

## 2021-12-27 NOTE — EICU
New Patient Evaluation    75 F with history of COPD, pulmonary hypertension (PASP 46 mmHg) , lymphoma, brought in from nursing home due to desaturation and respiratory distress. Also noted to have altered sensorium. Now on BiPap. Given systemic steroids and bronchodilators enroute.    Patient seen tolerating BiPap 14/6  FiO2 50% MV 10  BP 98/56  HR 68  O2 98%    CXR with acute reticulonodular parenchymal opacification as well as some ground glass opacification in the lung bases bilaterally. Mild costophrenic angle blunting.    ABG 7.23/82/72  WBC 12.87  H/H 9.1/32.6 Platelets 499  Creatinine 1.4  BNP 1540  Lactic acid 2.4    · Acute on chronic hypoxemic and hypercapneic respiratory failure on BiPap. Continuing antibiotics for pneumonia. Continue steroids if COPD exacerbation considered. Diurese as tolerated to maintain euvolemia.

## 2021-12-27 NOTE — H&P
[facility] Medicine  History & Physical    Patient Name: Patrizia Valadez  MRN: [id number]  Patient Class: IP- Inpatient  Admission Date: 12/26/2021  Attending Physician: James Garcia MD   Primary Care Provider: Anya Farr NP         Patient information was obtained from patient, past medical records and ER records.     Subjective:     Principal Problem:Bilateral pneumonia    Chief Complaint:   Chief Complaint   Patient presents with    Shortness of Breath        HPI: Patrizia Valadez is a 75-year-old female with a past medical history of COPD, chronic respiratory failure on oxygen at home, lymphoma, and past surgical history of cholecystectomy who presented to the ED with a complaint of shortness of breath. HPI is limited due to acuity of condition. She was sent from a nursing some for decreased mental status as well as low oxygen saturation and respiratory distress. Per ED notes, EMS found the patient to have a pulse ox of 79% on arrival. She received a duoneb treatment, albuterol treatment and solumedrol 125 mg prior to arrival.     Upon arrival to the ED, the patient was found to be altered and confused, and in respiratory distress. She was placed on a non re breather mask, which improved her oxygenation to 98% but she did require non invasive ventilation. She was placed on BiPAP at 50%, 14/6, which has improved her respiratory status. She is more alert and awake, and is able to answer simple questions appropriately. Chest xray revealed bilateral pneumonia along with chronic findings. CBC revealed leukocytosis of 12.87 and what appears to be chronic anemia. Initial lactic acid level was elevated at 2.4, but has decreased to 1.5. Troponin negative, urine negative. BNP elevated at 1540. She was given one dose of Lasix 40 mg IV. Due to continued use of BiPAP and high acuity, she will be admitted to the ICU. The patient does state she is a DNR, review of medical record does show past  recent admissions as a DNR. She does not have paper work with her today. Hospital Medicine consulted for admission and further management.          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     Current Outpatient Medications on File Prior to Encounter   Medication Sig    acetaminophen (TYLENOL) 325 MG tablet Take 650 mg by mouth every 6 (six) hours as needed for Pain.    albuterol (VENTOLIN HFA) 90 mcg/actuation inhaler Inhale 2 puffs into the lungs every 6 (six) hours as needed for Wheezing. Rescue    alendronate (FOSAMAX) 70 MG tablet Take 0.5 tablets (35 mg total) by mouth every 7 days.    ALPRAZolam (XANAX) 0.5 MG tablet Take 1 tablet (0.5 mg total) by mouth 2 (two) times daily as needed for Anxiety.    apixaban (ELIQUIS) 2.5 mg Tab Take 1 tablet (2.5 mg total) by mouth 2 (two) times daily.    CYANOCOBALAMIN, VITAMIN B-12, ORAL Take 1 tablet by mouth once daily.    diltiaZEM (CARDIZEM CD) 180 MG 24 hr capsule Take 1 capsule (180 mg total) by mouth once daily.    ferrous sulfate (IRON) 325 mg (65 mg iron) Tab tablet Take 1 tablet (325 mg total) by mouth 2 (two) times daily.    fluticasone-umeclidin-vilanter (TRELEGY ELLIPTA) 100-62.5-25 mcg DsDv Inhale 1 puff into the lungs once daily.    furosemide (LASIX) 20 MG tablet Take 1  tablet (20 mg total) by mouth once daily.    metoprolol tartrate (LOPRESSOR) 25 MG tablet Take 1 tablet (25 mg total) by mouth 2 (two) times daily.    midodrine (PROAMATINE) 5 MG Tab Take 5 mg by mouth 3 (three) times daily.    mirtazapine (REMERON) 15 MG tablet Take 1 tablet (15 mg total) by mouth every evening.    multivitamin with minerals tablet Take 1 tablet by mouth once daily.    omega 3-dha-epa-fish oil 1,000 mg (120 mg-180 mg) Cap Take 1 capsule by mouth once daily.    ondansetron (ZOFRAN) 4 MG tablet Take 4 mg by mouth every 6 (six) hours as needed for Nausea.    pantoprazole (PROTONIX) 40 MG tablet Take 1 tablet (40 mg total) by mouth once daily.    polyethylene glycol (GLYCOLAX) 17 gram/dose powder Take 17 g by mouth once daily.    potassium chloride (KLOR-CON) 10 MEQ TbSR Take 1 tablet (10 mEq total) by mouth once daily.    vitamin D (VITAMIN D3) 1000 units Tab Take 1,000 Units by mouth once daily.     Family History     Problem Relation (Age of Onset)    Hypertension Mother        Tobacco Use    Smoking status: Former Smoker     Packs/day: 0.25     Years: 60.00     Pack years: 15.00     Types: Cigarettes     Quit date: 2012     Years since quittin.3    Smokeless tobacco: Never Used    Tobacco comment: Ex smoker    Substance and Sexual Activity    Alcohol use: Not Currently    Drug use: Not on file    Sexual activity: Not on file     Review of Systems   Unable to perform ROS: Acuity of condition   Constitutional: Negative for chills and fever.   HENT: Negative for congestion and sore throat.    Eyes: Negative for visual disturbance.   Respiratory: Positive for shortness of breath and wheezing. Negative for cough.    Cardiovascular: Negative for chest pain and palpitations.   Gastrointestinal: Negative for abdominal pain, nausea and vomiting.   Endocrine: Negative for cold intolerance and heat intolerance.   Genitourinary: Negative for dysuria and hematuria.    Musculoskeletal: Negative for arthralgias and myalgias.   Skin: Negative for pallor and rash.   Neurological: Negative for tremors and seizures.   Hematological: Negative for adenopathy. Does not bruise/bleed easily.   Psychiatric/Behavioral: Negative for hallucinations.   All other systems reviewed and are negative.    Objective:     Vital Signs (Most Recent):  Temp: 97.16 °F (36.2 °C) (12/27/21 0130)  Pulse: 85 (12/27/21 0130)  Resp: (!) 25 (12/27/21 0130)  BP: (!) 112/52 (12/26/21 2122)  SpO2: 100 % (12/27/21 0130) Vital Signs (24h Range):  Temp:  [97.16 °F (36.2 °C)-98 °F (36.7 °C)] 97.16 °F (36.2 °C)  Pulse:  [69-99] 85  Resp:  [25-41] 25  SpO2:  [93 %-100 %] 100 %  BP: ()/(52-65) 112/52     Weight: 50.3 kg (111 lb)  Body mass index is 19.66 kg/m².    Physical Exam  Vitals and nursing note reviewed.   Constitutional:       General: She is awake. She is in acute distress.      Appearance: She is well-developed and underweight. She is ill-appearing.      Interventions: Face mask in place.      Comments: On bipap   HENT:      Head: Normocephalic and atraumatic.   Eyes:      Conjunctiva/sclera: Conjunctivae normal.      Pupils: Pupils are equal, round, and reactive to light.   Neck:      Thyroid: No thyromegaly.      Vascular: No JVD.   Cardiovascular:      Rate and Rhythm: Regular rhythm. Tachycardia present.      Pulses: Normal pulses.      Heart sounds: S1 normal and S2 normal. Heart sounds not distant. No murmur heard.  No friction rub. No gallop.    Pulmonary:      Effort: Tachypnea and respiratory distress present.      Breath sounds: Decreased air movement present. Examination of the right-upper field reveals wheezing. Examination of the left-upper field reveals wheezing. Examination of the right-middle field reveals wheezing and rhonchi. Examination of the left-middle field reveals wheezing. Examination of the right-lower field reveals decreased breath sounds and rhonchi. Examination of the  left-lower field reveals decreased breath sounds. Decreased breath sounds, wheezing and rhonchi present.   Abdominal:      General: Abdomen is flat. Bowel sounds are normal. There is no distension.      Palpations: Abdomen is soft. There is no mass.      Tenderness: There is no abdominal tenderness.   Musculoskeletal:         General: No edema. Normal range of motion.      Cervical back: Full passive range of motion without pain, normal range of motion and neck supple.      Right lower leg: No edema.      Left lower leg: No edema.   Skin:     General: Skin is warm and dry.      Capillary Refill: Capillary refill takes less than 2 seconds.   Neurological:      General: No focal deficit present.      Mental Status: She is alert and oriented to person, place, and time. Mental status is at baseline.      Cranial Nerves: No cranial nerve deficit.      Motor: Motor function is intact.      Coordination: Coordination is intact.   Psychiatric:         Behavior: Behavior normal. Behavior is cooperative.           CRANIAL NERVES     CN III, IV, VI   Pupils are equal, round, and reactive to light.       Significant Labs:   All pertinent labs within the past 24 hours have been reviewed.  ABGs:   Recent Labs   Lab 12/26/21 2007   PH 7.233*   PCO2 81.8*   HCO3 34.5*   POCSATURATED 90*   BE 7   PO2 72*     CBC:   Recent Labs   Lab 12/26/21 1902   WBC 12.87*   HGB 9.1*   HCT 32.6*   *     CMP:   Recent Labs   Lab 12/26/21 1902      K 4.5   CL 99   CO2 28   *   BUN 21   CREATININE 1.4   CALCIUM 9.5   PROT 6.6   ALBUMIN 2.8*   BILITOT 0.4   ALKPHOS 81   AST 13   ALT 12   ANIONGAP 13   EGFRNONAA 37*     Cardiac Markers:   Recent Labs   Lab 12/26/21 1902   BNP 1,540*     Coagulation:   Recent Labs   Lab 12/26/21 1902   INR 1.0     Lactic Acid:   Recent Labs   Lab 12/26/21 1902 12/26/21  2235   LACTATE 2.4* 1.5     Lipase:   Recent Labs   Lab 12/26/21 1902   LIPASE 24     Magnesium:   Recent Labs   Lab  "12/26/21  1902   MG 2.0     POCT Glucose:   Recent Labs   Lab 12/26/21  1850   POCTGLUCOSE 288*     Troponin:   Recent Labs   Lab 12/26/21  1902   TROPONINI 0.016     TSH:   Recent Labs   Lab 09/08/21  1230   TSH 3.300     Urine Studies:   Recent Labs   Lab 12/26/21 2045   COLORU Yellow   APPEARANCEUA Clear   PHUR 6.0   SPECGRAV >=1.030*   PROTEINUA 1+*   GLUCUA Negative   KETONESU Negative   BILIRUBINUA Negative   OCCULTUA Negative   NITRITE Negative   UROBILINOGEN 1.0   LEUKOCYTESUR Negative   RBCUA 2   WBCUA 6*   BACTERIA Occasional   SQUAMEPITHEL 3   HYALINECASTS 0       Significant Imaging:    Chest xray: Acute lung base parenchymal opacities/consolidation as might be seen with CHF or nonspecific pneumonitis    Assessment/Plan:     * Bilateral pneumonia  Patient with current diagnosis of pneumonia due to bacterial etiology due to  unknown organism which is uncontrolled due to persistent hypoxia . I have reviewed the pertinent imaging. Current antimicrobial regimen consists of   Antibiotics (From admission, onward)            Start     Stop Route Frequency Ordered    12/27/21 0900  mupirocin 2 % ointment         01/01 0859 Nasl 2 times daily 12/26/21 2342    12/27/21 0043  vancomycin - pharmacy to dose  (vancomycin IVPB)        "And" Linked Group Details    -- IV pharmacy to manage frequency 12/26/21 2343    12/26/21 2115  piperacillin-tazobactam 4.5 g in dextrose 5 % 100 mL IVPB (ready to mix system)  ( Pneumonia - Moderate-High MDR )         12/31 2114 IV Every 8 hours (non-standard times) 12/26/21 2013 12/26/21 2105  vancomycin - pharmacy to dose  ( Pneumonia - Moderate-High MDR )        "And" Linked Group Details    -- IV pharmacy to manage frequency 12/26/21 2013      . Cultures drawn and noted-   Microbiology Results (last 7 days)     Procedure Component Value Units Date/Time    Blood culture x two cultures. Draw prior to antibiotics. [[id number]] Collected: 12/26/21 1910    Order Status: Sent Specimen: " Blood Updated: 12/26/21 1910    Blood culture x two cultures. Draw prior to antibiotics. [[id number]] Collected: 12/26/21 1902    Order Status: Sent Specimen: Blood from Peripheral, Right Updated: 12/26/21 1903       Will monitor patient closely and continue current treatment plan unchanged.      Chronic hypotension on midodrine  Noted, continue midodrine      Chronic anemia  Patient's anemia is currently controlled. Has not received any PRBCs to date.. Etiology likely d/t chronic disease  Current CBC reviewed-   Lab Results   Component Value Date    HGB 9.1 (L) 12/26/2021    HCT 32.6 (L) 12/26/2021     Monitor serial CBC and transfuse if patient becomes hemodynamically unstable, symptomatic or H/H drops below 7/21.         Congestive heart failure  Patient is identified as having Combined Systolic and Diastolic heart failure that is Acute on chronic. CHF is currently uncontrolled due to Dyspnea , Rales/crackles on pulmonary exam and Pulmonary edema/pleural effusion on CXR. Latest ECHO performed and demonstrates- Results for orders placed during the hospital encounter of 09/08/21    Echo Saline Bubble? No    Interpretation Summary  · The estimated PA systolic pressure is 46 mmHg.  · Concentric remodeling and normal systolic function.  · The estimated ejection fraction is 55%.  · Normal left ventricular diastolic function.  · Normal right ventricular size with normal right ventricular systolic function.  · Moderate mitral regurgitation.  · Moderate to severe tricuspid regurgitation.  . Continue Beta Blocker and Furosemide and monitor clinical status closely. Monitor on telemetry. Patient is off CHF pathway.  Monitor strict Is&Os and daily weights. Continue to stress to patient importance of self efficacy and  on diet for CHF. Last BNP reviewed- and noted below   Recent Labs   Lab 12/26/21 1902   BNP 1,540*   .      Paroxysmal atrial fibrillation  Patient with Long standing persistent (>12 months) atrial  fibrillation which is controlled currently with Calcium Channel Blocker. Patient is currently in sinus rhythm.[id number] Score: 3. Anticoagulation indicated. Anticoagulation done with eliquis.      Acute on chronic respiratory failure with hypoxia and hypercapnia  Patient with Hypercapnic and Hypoxic Respiratory failure which is Acute on chronic.  she is on home oxygen at 2 LPM.     Supplemental oxygen was provided and noted-BIPAP Oxygen Concentration (%):  [50-80] 50. I/E 14/6    Signs/symptoms of respiratory failure include- tachypnea, increased work of breathing, respiratory distress, use of accessory muscles and wheezing. Contributing diagnoses includes - CHF, COPD and Pneumonia     Labs and images were reviewed. Patient Has recent ABG, which has been reviewed.   Recent Labs     12/26/21 2007   PH 7.233*   PCO2 81.8*   PO2 72*   HCO3 34.5*   POCSATURATED 90*   BE 7    Will treat underlying causes and adjust management of respiratory failure as follows- antibiotics, continue bipap, supplemental oxygen    COPD (chronic obstructive pulmonary disease)  Patient's COPD is controlled currently.  Patient is currently off COPD Pathway. Continue scheduled inhalers Antibiotics and Supplemental oxygen and monitor respiratory status closely.           VTE Risk Mitigation (From admission, onward)         Ordered     apixaban tablet 2.5 mg  2 times daily        SCDs 12/26/21 2002              Critical care time spent on the evaluation and treatment of severe organ dysfunction, review of pertinent labs and imaging studies, discussions with consulting providers and discussions with patient/family: 60 minutes.     OCTAVIANO Camacho  Department of Hospital Medicine   [facility]

## 2021-12-27 NOTE — CONSULTS
Pharmacokinetic Initial Assessment: IV Vancomycin    Assessment/Plan:    Initiate intravenous vancomycin with dose of 1000 mg once with subsequent doses when random concentrations are less than 20 mcg/mL  Desired empiric serum trough concentration is 15 to 20 mcg/mL  Draw vancomycin random level on 12/27/21 at 1400.  Pharmacy will continue to follow and monitor vancomycin.      Please contact pharmacy at extension [phone] with any questions regarding this assessment.     Thank you for the consult,   Oscar Baroneuyen       Patient brief summary:  Patrizia Valadez is a 75 y.o. female initiated on antimicrobial therapy with IV Vancomycin for treatment of suspected lower respiratory infection    Drug Allergies:   Review of patient's allergies indicates:   Allergen Reactions    Xanax [alprazolam] Hallucinations    Hydromorphone      Nausea^  Nausea^         Actual Body Weight:   50.3kg    Renal Function:   Estimated Creatinine Clearance: 27.6 mL/min (based on SCr of 1.4 mg/dL).,     CBC (last 72 hours):  Recent Labs   Lab Result Units 12/26/21  1902   WBC K/uL 12.87*   Hemoglobin g/dL 9.1*   Hematocrit % 32.6*   Platelets K/uL 499*   Gran % % 56.1   Lymph % % 38.2   Mono % % 3.3*   Eosinophil % % 1.6   Basophil % % 0.5   Differential Method  Automated       Metabolic Panel (last 72 hours):  Recent Labs   Lab Result Units 12/26/21  1902   Sodium mmol/L 140   Potassium mmol/L 4.5   Chloride mmol/L 99   CO2 mmol/L 28   Glucose mg/dL 240*   BUN mg/dL 21   Creatinine mg/dL 1.4   Albumin g/dL 2.8*   Total Bilirubin mg/dL 0.4   Alkaline Phosphatase U/L 81   AST U/L 13   ALT U/L 12   Magnesium mg/dL 2.0       Drug levels (last 3 results):  No results for input(s): VANCOMYCINRA, VANCOMYCINPE, VANCOMYCINTR in the last 72 hours.    Microbiologic Results:  Microbiology Results (last 7 days)       Procedure Component Value Units Date/Time    Blood culture x two cultures. Draw prior to antibiotics. [[id number]] Collected: 12/26/21 1910    Order  Status: Sent Specimen: Blood Updated: 12/26/21 1910    Blood culture x two cultures. Draw prior to antibiotics. [[id number]] Collected: 12/26/21 1902    Order Status: Sent Specimen: Blood from Peripheral, Right Updated: 12/26/21 1903

## 2021-12-27 NOTE — ED PROVIDER NOTES
Encounter Date: 2021       History     Chief Complaint   Patient presents with    Shortness of Breath     Patient is a 75-year-old female with a past medical history of COPD chronic respiratory failure lymphoma cancer in her chart who presents the emergency room as referral from nursing home for bilateral pneumonia.  Patient was 79% on room air for EMS.  He she was in respiratory distress and they gave 1 DuoNeb, 1 regular albuterol, Solu-Medrol 125. Upon arrival patient is altered and confused and respiratory distress.  Non-rebreather has her oxygen level 98%.  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    Respiratory failure, chronic      Past Surgical History:   Procedure Laterality Date    CHOLECYSTECTOMY      ESOPHAGOGASTRODUODENOSCOPY N/A 10/23/2021    Procedure: EGD (ESOPHAGOGASTRODUODENOSCOPY);  Surgeon: Jaja Hoover MD;  Location: [facility];  Service: Endoscopy;  Laterality: N/A;    HYSTERECTOMY      NO PAST SURGERIES       Family History   Problem Relation Age of Onset    Hypertension Mother      Social History     Tobacco Use    Smoking status: Former Smoker     Packs/day: 0.25     Years: 60.00     Pack years: 15.00     Types: Cigarettes     Quit date: 2012     Years since quittin.3    Smokeless tobacco: Never Used    Tobacco comment: Ex smoker    Substance Use Topics    Alcohol use: Not Currently     Review of Systems   Unable to perform ROS: Acuity of condition   Respiratory: Positive for shortness of breath.        Physical Exam     Initial Vitals   BP Pulse Resp Temp SpO2   21 1852 21 1852 21 1852 21 1929 21 185    (!) 122/53 94 (!) 41 98 °F (36.7 °C) 98 %      MAP       --                Physical Exam    Nursing note and vitals reviewed.  Constitutional: She appears well-developed and well-nourished. She appears distressed.   Eyes: Conjunctivae and EOM are normal. Pupils are equal, round, and reactive to light.   Neck: Neck supple. No JVD present.   Cardiovascular: Normal rate, regular rhythm, normal heart sounds and intact distal pulses.   Pulmonary/Chest: Breath sounds normal.   Patient not moving much air at all and little that she is there is inspiratory and expiratory wheezing   Abdominal: Abdomen is soft. Bowel sounds are normal. There is no abdominal tenderness.   Musculoskeletal:         General: No edema. Normal range of motion.      Cervical back: Neck supple.     Neurological:   Extremely lethargic but somewhat arousable   Skin: Skin is warm and dry. No rash noted. No pallor.         ED Course   Critical Care    Date/Time: 12/27/2021 12:51 AM  Performed by: Keegan Light MD  Authorized by: Keegan Light MD   Direct patient critical care time: 25 minutes  Additional history critical care time: 5 minutes  Ordering / reviewing critical care time: 5 minutes  Documentation critical care time: 5 minutes  Consulting other physicians critical care time: 5 minutes  Consult with family critical care time: 5 minutes  Total critical care time (exclusive of procedural time) : 50 minutes  Critical care was necessary to treat or prevent imminent or life-threatening deterioration of the following conditions: respiratory failure.  Critical care was time spent personally by me on the following activities: discussions with consultants, evaluation of patient's response to treatment, obtaining history from patient or surrogate, ordering and review of laboratory studies, pulse oximetry, examination of patient, ordering and performing treatments and interventions, ordering and review of radiographic studies and re-evaluation of  patient's condition.        Labs Reviewed   CBC W/ AUTO DIFFERENTIAL - Abnormal; Notable for the following components:       Result Value    WBC 12.87 (*)     RBC 3.24 (*)     Hemoglobin 9.1 (*)     Hematocrit 32.6 (*)      (*)     MCHC 27.9 (*)     RDW 15.7 (*)     Platelets 499 (*)     Lymph # 4.9 (*)     Mono % 3.3 (*)     All other components within normal limits   COMPREHENSIVE METABOLIC PANEL - Abnormal; Notable for the following components:    Glucose 240 (*)     Albumin 2.8 (*)     eGFR if  42 (*)     eGFR if non  37 (*)     All other components within normal limits   LACTIC ACID, PLASMA - Abnormal; Notable for the following components:    Lactate (Lactic Acid) 2.4 (*)     All other components within normal limits   URINALYSIS, REFLEX TO URINE CULTURE - Abnormal; Notable for the following components:    Specific Gravity, UA >=1.030 (*)     Protein, UA 1+ (*)     All other components within normal limits    Narrative:     Specimen Source->Urine   B-TYPE NATRIURETIC PEPTIDE - Abnormal; Notable for the following components:    BNP 1,540 (*)     All other components within normal limits   URINALYSIS MICROSCOPIC - Abnormal; Notable for the following components:    WBC, UA 6 (*)     All other components within normal limits    Narrative:     Specimen Source->Urine   POCT GLUCOSE - Abnormal; Notable for the following components:    POCT Glucose 288 (*)     All other components within normal limits   ISTAT PROCEDURE - Abnormal; Notable for the following components:    POC PH 7.233 (*)     POC PCO2 81.8 (*)     POC PO2 72 (*)     POC HCO3 34.5 (*)     POC SATURATED O2 90 (*)     POC TCO2 37 (*)     All other components within normal limits   CULTURE, BLOOD   CULTURE, BLOOD   PROTIME-INR   TROPONIN I   LIPASE   MAGNESIUM   SARS-COV-2 RNA AMPLIFICATION, QUAL          Imaging Results           X-Ray Chest AP Portable (Final result)  Result time 12/26/21 19:51:39    Final result by  Lalita Garcia MD (12/26/21 19:51:39)                 Impression:      Acute lung base parenchymal opacities/consolidation as might be seen with CHF or nonspecific pneumonitis.    This report was flagged in Epic as abnormal.      Electronically signed by: Lalita Garcia  Date:    12/26/2021  Time:    19:51             Narrative:    EXAMINATION:  XR CHEST AP PORTABLE    CLINICAL HISTORY:  Sepsis;    TECHNIQUE:  Single frontal view of the chest was performed.    COMPARISON:  This report was flagged in Epic as abnormal.    FINDINGS:  Cardiac silhouette is stable and nonenlarged.  There is acute reticulonodular parenchymal opacification as well as some ground-glass opacification in the lung bases bilaterally.  Mild costophrenic angle blunting suggest trace pleural effusion bilaterally.  There is no evidence of pneumothorax or pneumomediastinum.  Degenerative changes of the spine and shoulders noted.                                 Medications   apixaban tablet 2.5 mg (2.5 mg Oral Not Given 12/26/21 2115)   cyanocobalamin tablet 1,000 mcg (has no administration in time range)   diltiaZEM 24 hr capsule 180 mg (has no administration in time range)   ferrous sulfate tablet 1 each (has no administration in time range)   furosemide tablet 20 mg (has no administration in time range)   metoprolol tartrate (LOPRESSOR) tablet 25 mg (25 mg Oral Not Given 12/26/21 2115)   midodrine tablet 5 mg (5 mg Oral Not Given 12/26/21 2115)   mirtazapine tablet 15 mg (15 mg Oral Not Given 12/26/21 2115)   multivitamin tablet (has no administration in time range)   potassium chloride CR tablet 10 mEq (has no administration in time range)   vitamin D 1000 units tablet 1,000 Units (has no administration in time range)   pantoprazole injection 40 mg (has no administration in time range)   sodium chloride 0.9% flush 10 mL (has no administration in time range)   0.9%  NaCl infusion ( Intravenous New Bag 12/27/21 0007)   piperacillin-tazobactam  4.5 g in dextrose 5 % 100 mL IVPB (ready to mix system) (4.5 g Intravenous New Bag 12/27/21 0019)   vancomycin - pharmacy to dose (has no administration in time range)   ondansetron injection 8 mg (has no administration in time range)   albuterol-ipratropium 2.5 mg-0.5 mg/3 mL nebulizer solution 3 mL (has no administration in time range)   acetaminophen tablet 650 mg (has no administration in time range)   0.9%  NaCl infusion ( Intravenous New Bag 12/27/21 0008)   mupirocin 2 % ointment (has no administration in time range)   furosemide injection 40 mg (0 mg Intravenous Hold 12/27/21 0000)   lactated ringers bolus 1,000 mL (0 mLs Intravenous Stopped 12/26/21 2018)   albuterol sulfate nebulizer solution 10 mg (10 mg Nebulization Given 12/26/21 1855)   magnesium sulfate 2g in water 50mL IVPB (premix) (0 g Intravenous Stopped 12/26/21 2018)   vancomycin in dextrose 5 % 1 gram/250 mL IVPB 1,000 mg (0 mg Intravenous Stopped 12/26/21 2244)                 ED Course as of 12/27/21 0051   Sun Dec 26, 2021   1905 Patient has acute respiratory failure with likely hypercapnia and altered mental status.  Thankfully she is improving on BiPAP. [JS]   1920 Chest x-ray appears to show COPD with a bilateral pneumonia.  Will start antibiotics but awaiting COVID test. [JS]   2041 Troponin negative lipase within normal limits lactic acid 2.4 pH 7.2.  Patient doing much better on BiPAP.  Patient has been given broad-spectrum antibiotics.  COVID negative.  She will be admitted to the ICU.  Patient states she is a DNR [JS]      ED Course User Index  [JS] Keegan Light MD             Clinical Impression:   Final diagnoses:  [R00.0] Tachycardia  [J18.9] Bilateral pneumonia  [J96.00] Acute respiratory failure, unspecified whether with hypoxia or hypercapnia (Primary)          ED Disposition Condition    Admit               Keegan Light MD  12/27/21 0051

## 2021-12-27 NOTE — ASSESSMENT & PLAN NOTE
"Patient with current diagnosis of pneumonia due to bacterial etiology due to  unknown organism which is uncontrolled due to persistent hypoxia . I have reviewed the pertinent imaging. Current antimicrobial regimen consists of   Antibiotics (From admission, onward)            Start     Stop Route Frequency Ordered    12/27/21 0900  mupirocin 2 % ointment         01/01 0859 Nasl 2 times daily 12/26/21 2342    12/27/21 0043  vancomycin - pharmacy to dose  (vancomycin IVPB)        "And" Linked Group Details    -- IV pharmacy to manage frequency 12/26/21 2343 12/26/21 2115  piperacillin-tazobactam 4.5 g in dextrose 5 % 100 mL IVPB (ready to mix system)  ( Pneumonia - Moderate-High MDR )         12/31 2114 IV Every 8 hours (non-standard times) 12/26/21 2013 12/26/21 2105  vancomycin - pharmacy to dose  ( Pneumonia - Moderate-High MDR )        "And" Linked Group Details    -- IV pharmacy to manage frequency 12/26/21 2013      . Cultures drawn and noted-   Microbiology Results (last 7 days)     Procedure Component Value Units Date/Time    Blood culture x two cultures. Draw prior to antibiotics. [[id number]] Collected: 12/26/21 1910    Order Status: Sent Specimen: Blood Updated: 12/26/21 1910    Blood culture x two cultures. Draw prior to antibiotics. [[id number]] Collected: 12/26/21 1902    Order Status: Sent Specimen: Blood from Peripheral, Right Updated: 12/26/21 1903       Will monitor patient closely and continue current treatment plan unchanged.    "

## 2021-12-27 NOTE — ASSESSMENT & PLAN NOTE
Patient with Hypercapnic and Hypoxic Respiratory failure which is Acute on chronic.  she is on home oxygen at 2 LPM.     Supplemental oxygen was provided and noted-BIPAP Oxygen Concentration (%):  [50-80] 50. I/E 14/6    Signs/symptoms of respiratory failure include- tachypnea, increased work of breathing, respiratory distress, use of accessory muscles and wheezing. Contributing diagnoses includes - CHF, COPD and Pneumonia     Labs and images were reviewed. Patient Has recent ABG, which has been reviewed.   Recent Labs     12/26/21 2007   PH 7.233*   PCO2 81.8*   PO2 72*   HCO3 34.5*   POCSATURATED 90*   BE 7    Will treat underlying causes and adjust management of respiratory failure as follows- antibiotics, continue bipap, supplemental oxygen

## 2021-12-27 NOTE — PROGRESS NOTES
Patrizia Valadez [id number] is a 75 y.o. female who has been consulted for vancomycin dosing.    Pharmacy consult for vancomycin dosing in no longer required.  Vancomycin was discontinued.    Thank you for allowing us to participate in this patient's care.     Brant Melendez, PharmD  [phone]

## 2021-12-27 NOTE — PLAN OF CARE
POC reviewed with patient and daughter. Alert, oriented but does repeat some things. Daughter endorses patient does have dementia and sometimes forgetful. Bipap continuous today. SOB and tachypnea with minimal exertion. Sinus rhythm to afib RVR today. Dioxin IVP  - no response. Cardizem bolus and drip started. Remains afib with rate from 90s-130s. Blood pressure stable. Stout with good output after lasix IV. Dr. Young did see patient for bleeding - will continue to follow. Lidocaine patch applied to right shoulder for chronic shoulder pain. Given bendryl for mild anxiety. Daughter at bedside today-password set up. Safety measures in place. Bed alarm in use.     Problem: Adult Inpatient Plan of Care  Goal: Plan of Care Review  Outcome: Ongoing, Progressing     Problem: Adult Inpatient Plan of Care  Goal: Patient-Specific Goal (Individualized)  Outcome: Ongoing, Progressing     Problem: Adult Inpatient Plan of Care  Goal: Absence of Hospital-Acquired Illness or Injury  Outcome: Ongoing, Progressing     Problem: Adult Inpatient Plan of Care  Goal: Optimal Comfort and Wellbeing  Outcome: Ongoing, Progressing     Problem: Infection  Goal: Absence of Infection Signs and Symptoms  Outcome: Ongoing, Progressing     Problem: Fluid Imbalance (Pneumonia)  Goal: Fluid Balance  Outcome: Ongoing, Progressing     Problem: Infection (Pneumonia)  Goal: Resolution of Infection Signs and Symptoms  Outcome: Ongoing, Progressing     Problem: Respiratory Compromise (Pneumonia)  Goal: Effective Oxygenation and Ventilation  Outcome: Ongoing, Progressing     Problem: Skin Injury Risk Increased  Goal: Skin Health and Integrity  Outcome: Ongoing, Progressing     Problem: Adjustment to Illness (Heart Failure)  Goal: Optimal Coping  Outcome: Ongoing, Progressing     Problem: Cardiac Output Decreased (Heart Failure)  Goal: Optimal Cardiac Output  Outcome: Ongoing, Progressing     Problem: Dysrhythmia (Heart Failure)  Goal: Stable Heart Rate  and Rhythm  Outcome: Ongoing, Progressing     Problem: Fluid Imbalance (Heart Failure)  Goal: Fluid Balance  Outcome: Ongoing, Progressing     Problem: Functional Ability Impaired (Heart Failure)  Goal: Optimal Functional Ability  Outcome: Ongoing, Progressing     Problem: Adjustment to Illness (Gastrointestinal Bleeding)  Goal: Optimal Coping with Acute Illness  Outcome: Ongoing, Progressing

## 2021-12-27 NOTE — SUBJECTIVE & OBJECTIVE
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    Respiratory failure, chronic        Past Surgical History:   Procedure Laterality Date    CHOLECYSTECTOMY      ESOPHAGOGASTRODUODENOSCOPY N/A 10/23/2021    Procedure: EGD (ESOPHAGOGASTRODUODENOSCOPY);  Surgeon: Jaja Hoover MD;  Location: [facility];  Service: Endoscopy;  Laterality: N/A;    HYSTERECTOMY      NO PAST SURGERIES         Review of patient's allergies indicates:   Allergen Reactions    Xanax [alprazolam] Hallucinations    Hydromorphone      Nausea^  Nausea^         No current facility-administered medications on file prior to encounter.     Current Outpatient Medications on File Prior to Encounter   Medication Sig    acetaminophen (TYLENOL) 325 MG tablet Take 650 mg by mouth every 6 (six) hours as needed for Pain.    albuterol (VENTOLIN HFA) 90 mcg/actuation inhaler Inhale 2 puffs into the lungs every 6 (six) hours as needed for Wheezing. Rescue    alendronate (FOSAMAX) 70 MG tablet Take 0.5 tablets (35 mg total) by mouth every 7 days.    ALPRAZolam (XANAX) 0.5 MG tablet Take 1 tablet (0.5 mg total) by mouth 2 (two) times daily as needed for Anxiety.    apixaban (ELIQUIS) 2.5 mg Tab Take 1 tablet (2.5 mg total) by mouth 2 (two) times daily.    CYANOCOBALAMIN, VITAMIN B-12, ORAL Take 1 tablet by mouth once daily.    diltiaZEM (CARDIZEM CD) 180 MG 24 hr capsule Take 1 capsule (180 mg total) by mouth once daily.    ferrous sulfate (IRON) 325 mg (65 mg iron) Tab tablet Take 1 tablet (325 mg total) by mouth 2 (two) times daily.    fluticasone-umeclidin-vilanter (TRELEGY ELLIPTA) 100-62.5-25 mcg DsDv Inhale 1 puff into the lungs once daily.    furosemide (LASIX) 20 MG tablet Take 1 tablet (20 mg total) by mouth once daily.    metoprolol tartrate (LOPRESSOR) 25 MG tablet Take 1 tablet (25 mg total) by mouth 2 (two) times daily.     midodrine (PROAMATINE) 5 MG Tab Take 5 mg by mouth 3 (three) times daily.    mirtazapine (REMERON) 15 MG tablet Take 1 tablet (15 mg total) by mouth every evening.    multivitamin with minerals tablet Take 1 tablet by mouth once daily.    omega 3-dha-epa-fish oil 1,000 mg (120 mg-180 mg) Cap Take 1 capsule by mouth once daily.    ondansetron (ZOFRAN) 4 MG tablet Take 4 mg by mouth every 6 (six) hours as needed for Nausea.    pantoprazole (PROTONIX) 40 MG tablet Take 1 tablet (40 mg total) by mouth once daily.    polyethylene glycol (GLYCOLAX) 17 gram/dose powder Take 17 g by mouth once daily.    potassium chloride (KLOR-CON) 10 MEQ TbSR Take 1 tablet (10 mEq total) by mouth once daily.    vitamin D (VITAMIN D3) 1000 units Tab Take 1,000 Units by mouth once daily.     Family History     Problem Relation (Age of Onset)    Hypertension Mother        Tobacco Use    Smoking status: Former Smoker     Packs/day: 0.25     Years: 60.00     Pack years: 15.00     Types: Cigarettes     Quit date: 2012     Years since quittin.3    Smokeless tobacco: Never Used    Tobacco comment: Ex smoker    Substance and Sexual Activity    Alcohol use: Not Currently    Drug use: Not on file    Sexual activity: Not on file     Review of Systems   Unable to perform ROS: Acuity of condition   Constitutional: Negative for chills and fever.   HENT: Negative for congestion and sore throat.    Eyes: Negative for visual disturbance.   Respiratory: Positive for shortness of breath and wheezing. Negative for cough.    Cardiovascular: Negative for chest pain and palpitations.   Gastrointestinal: Negative for abdominal pain, nausea and vomiting.   Endocrine: Negative for cold intolerance and heat intolerance.   Genitourinary: Negative for dysuria and hematuria.   Musculoskeletal: Negative for arthralgias and myalgias.   Skin: Negative for pallor and rash.   Neurological: Negative for tremors and seizures.   Hematological:  Negative for adenopathy. Does not bruise/bleed easily.   Psychiatric/Behavioral: Negative for hallucinations.   All other systems reviewed and are negative.    Objective:     Vital Signs (Most Recent):  Temp: 97.16 °F (36.2 °C) (12/27/21 0130)  Pulse: 85 (12/27/21 0130)  Resp: (!) 25 (12/27/21 0130)  BP: (!) 112/52 (12/26/21 2122)  SpO2: 100 % (12/27/21 0130) Vital Signs (24h Range):  Temp:  [97.16 °F (36.2 °C)-98 °F (36.7 °C)] 97.16 °F (36.2 °C)  Pulse:  [69-99] 85  Resp:  [25-41] 25  SpO2:  [93 %-100 %] 100 %  BP: ()/(52-65) 112/52     Weight: 50.3 kg (111 lb)  Body mass index is 19.66 kg/m².    Physical Exam  Vitals and nursing note reviewed.   Constitutional:       General: She is awake. She is in acute distress.      Appearance: She is well-developed and underweight. She is ill-appearing.      Interventions: Face mask in place.      Comments: On bipap   HENT:      Head: Normocephalic and atraumatic.   Eyes:      Conjunctiva/sclera: Conjunctivae normal.      Pupils: Pupils are equal, round, and reactive to light.   Neck:      Thyroid: No thyromegaly.      Vascular: No JVD.   Cardiovascular:      Rate and Rhythm: Regular rhythm. Tachycardia present.      Pulses: Normal pulses.      Heart sounds: S1 normal and S2 normal. Heart sounds not distant. No murmur heard.  No friction rub. No gallop.    Pulmonary:      Effort: Tachypnea and respiratory distress present.      Breath sounds: Decreased air movement present. Examination of the right-upper field reveals wheezing. Examination of the left-upper field reveals wheezing. Examination of the right-middle field reveals wheezing and rhonchi. Examination of the left-middle field reveals wheezing. Examination of the right-lower field reveals decreased breath sounds and rhonchi. Examination of the left-lower field reveals decreased breath sounds. Decreased breath sounds, wheezing and rhonchi present.   Abdominal:      General: Abdomen is flat. Bowel sounds are  normal. There is no distension.      Palpations: Abdomen is soft. There is no mass.      Tenderness: There is no abdominal tenderness.   Musculoskeletal:         General: No edema. Normal range of motion.      Cervical back: Full passive range of motion without pain, normal range of motion and neck supple.      Right lower leg: No edema.      Left lower leg: No edema.   Skin:     General: Skin is warm and dry.      Capillary Refill: Capillary refill takes less than 2 seconds.   Neurological:      General: No focal deficit present.      Mental Status: She is alert and oriented to person, place, and time. Mental status is at baseline.      Cranial Nerves: No cranial nerve deficit.      Motor: Motor function is intact.      Coordination: Coordination is intact.   Psychiatric:         Behavior: Behavior normal. Behavior is cooperative.           CRANIAL NERVES     CN III, IV, VI   Pupils are equal, round, and reactive to light.       Significant Labs:   All pertinent labs within the past 24 hours have been reviewed.  ABGs:   Recent Labs   Lab 12/26/21 2007   PH 7.233*   PCO2 81.8*   HCO3 34.5*   POCSATURATED 90*   BE 7   PO2 72*     CBC:   Recent Labs   Lab 12/26/21 1902   WBC 12.87*   HGB 9.1*   HCT 32.6*   *     CMP:   Recent Labs   Lab 12/26/21 1902      K 4.5   CL 99   CO2 28   *   BUN 21   CREATININE 1.4   CALCIUM 9.5   PROT 6.6   ALBUMIN 2.8*   BILITOT 0.4   ALKPHOS 81   AST 13   ALT 12   ANIONGAP 13   EGFRNONAA 37*     Cardiac Markers:   Recent Labs   Lab 12/26/21 1902   BNP 1,540*     Coagulation:   Recent Labs   Lab 12/26/21 1902   INR 1.0     Lactic Acid:   Recent Labs   Lab 12/26/21 1902 12/26/21  2235   LACTATE 2.4* 1.5     Lipase:   Recent Labs   Lab 12/26/21 1902   LIPASE 24     Magnesium:   Recent Labs   Lab 12/26/21 1902   MG 2.0     POCT Glucose:   Recent Labs   Lab 12/26/21  1850   POCTGLUCOSE 288*     Troponin:   Recent Labs   Lab 12/26/21 1902   TROPONINI 0.016     TSH:    Recent Labs   Lab 09/08/21  1230   TSH 3.300     Urine Studies:   Recent Labs   Lab 12/26/21  2045   COLORU Yellow   APPEARANCEUA Clear   PHUR 6.0   SPECGRAV >=1.030*   PROTEINUA 1+*   GLUCUA Negative   KETONESU Negative   BILIRUBINUA Negative   OCCULTUA Negative   NITRITE Negative   UROBILINOGEN 1.0   LEUKOCYTESUR Negative   RBCUA 2   WBCUA 6*   BACTERIA Occasional   SQUAMEPITHEL 3   HYALINECASTS 0       Significant Imaging:    Chest xray: Acute lung base parenchymal opacities/consolidation as might be seen with CHF or nonspecific pneumonitis

## 2021-12-27 NOTE — ASSESSMENT & PLAN NOTE
Patient's COPD is controlled currently.  Patient is currently off COPD Pathway. Continue scheduled inhalers Antibiotics and Supplemental oxygen and monitor respiratory status closely.

## 2021-12-27 NOTE — ASSESSMENT & PLAN NOTE
Patient is identified as having Combined Systolic and Diastolic heart failure that is Acute on chronic. CHF is currently uncontrolled due to Dyspnea , Rales/crackles on pulmonary exam and Pulmonary edema/pleural effusion on CXR. Latest ECHO performed and demonstrates- Results for orders placed during the hospital encounter of 09/08/21    Echo Saline Bubble? No    Interpretation Summary  · The estimated PA systolic pressure is 46 mmHg.  · Concentric remodeling and normal systolic function.  · The estimated ejection fraction is 55%.  · Normal left ventricular diastolic function.  · Normal right ventricular size with normal right ventricular systolic function.  · Moderate mitral regurgitation.  · Moderate to severe tricuspid regurgitation.  . Continue Beta Blocker and Furosemide and monitor clinical status closely. Monitor on telemetry. Patient is off CHF pathway.  Monitor strict Is&Os and daily weights. Continue to stress to patient importance of self efficacy and  on diet for CHF. Last BNP reviewed- and noted below   Recent Labs   Lab 12/26/21 1902   BNP 1,540*   .

## 2021-12-28 PROBLEM — I50.33 ACUTE ON CHRONIC HEART FAILURE WITH PRESERVED EJECTION FRACTION: Status: ACTIVE | Noted: 2021-10-14

## 2021-12-28 PROBLEM — K92.2 UGIB (UPPER GASTROINTESTINAL BLEED): Status: RESOLVED | Noted: 2021-10-22 | Resolved: 2021-12-28

## 2021-12-28 PROBLEM — L03.90 CELLULITIS: Status: RESOLVED | Noted: 2021-09-30 | Resolved: 2021-12-28

## 2021-12-28 LAB
ALBUMIN SERPL BCP-MCNC: 2.5 G/DL (ref 3.5–5.2)
ALP SERPL-CCNC: 58 U/L (ref 55–135)
ALT SERPL W/O P-5'-P-CCNC: 10 U/L (ref 10–44)
ANION GAP SERPL CALC-SCNC: 14 MMOL/L (ref 8–16)
AST SERPL-CCNC: 12 U/L (ref 10–40)
BASOPHILS # BLD AUTO: 0.01 K/UL (ref 0–0.2)
BASOPHILS NFR BLD: 0.1 % (ref 0–1.9)
BILIRUB SERPL-MCNC: 0.4 MG/DL (ref 0.1–1)
BUN SERPL-MCNC: 22 MG/DL (ref 8–23)
CALCIUM SERPL-MCNC: 8.7 MG/DL (ref 8.7–10.5)
CHLORIDE SERPL-SCNC: 97 MMOL/L (ref 95–110)
CO2 SERPL-SCNC: 31 MMOL/L (ref 23–29)
CREAT SERPL-MCNC: 1.4 MG/DL (ref 0.5–1.4)
DIFFERENTIAL METHOD: ABNORMAL
EOSINOPHIL # BLD AUTO: 0 K/UL (ref 0–0.5)
EOSINOPHIL NFR BLD: 0 % (ref 0–8)
ERYTHROCYTE [DISTWIDTH] IN BLOOD BY AUTOMATED COUNT: 15.7 % (ref 11.5–14.5)
EST. GFR  (AFRICAN AMERICAN): 42 ML/MIN/1.73 M^2
EST. GFR  (NON AFRICAN AMERICAN): 37 ML/MIN/1.73 M^2
GLUCOSE SERPL-MCNC: 121 MG/DL (ref 70–110)
HCT VFR BLD AUTO: 23.8 % (ref 37–48.5)
HGB BLD-MCNC: 7 G/DL (ref 12–16)
IMM GRANULOCYTES # BLD AUTO: 0.07 K/UL (ref 0–0.04)
IMM GRANULOCYTES NFR BLD AUTO: 0.5 % (ref 0–0.5)
LYMPHOCYTES # BLD AUTO: 1.5 K/UL (ref 1–4.8)
LYMPHOCYTES NFR BLD: 11.6 % (ref 18–48)
MAGNESIUM SERPL-MCNC: 2.2 MG/DL (ref 1.6–2.6)
MCH RBC QN AUTO: 28.1 PG (ref 27–31)
MCHC RBC AUTO-ENTMCNC: 29.4 G/DL (ref 32–36)
MCV RBC AUTO: 96 FL (ref 82–98)
MONOCYTES # BLD AUTO: 0.5 K/UL (ref 0.3–1)
MONOCYTES NFR BLD: 3.8 % (ref 4–15)
NEUTROPHILS # BLD AUTO: 10.9 K/UL (ref 1.8–7.7)
NEUTROPHILS NFR BLD: 84 % (ref 38–73)
NRBC BLD-RTO: 0 /100 WBC
PHOSPHATE SERPL-MCNC: 4.2 MG/DL (ref 2.7–4.5)
PLATELET # BLD AUTO: 343 K/UL (ref 150–450)
PMV BLD AUTO: 9.3 FL (ref 9.2–12.9)
POTASSIUM SERPL-SCNC: 3.8 MMOL/L (ref 3.5–5.1)
PROT SERPL-MCNC: 5.6 G/DL (ref 6–8.4)
RBC # BLD AUTO: 2.49 M/UL (ref 4–5.4)
SODIUM SERPL-SCNC: 142 MMOL/L (ref 136–145)
WBC # BLD AUTO: 13.03 K/UL (ref 3.9–12.7)

## 2021-12-28 PROCEDURE — 25000003 PHARM REV CODE 250: Performed by: EMERGENCY MEDICINE

## 2021-12-28 PROCEDURE — 99900035 HC TECH TIME PER 15 MIN (STAT)

## 2021-12-28 PROCEDURE — 63600175 PHARM REV CODE 636 W HCPCS: Performed by: NURSE PRACTITIONER

## 2021-12-28 PROCEDURE — 94761 N-INVAS EAR/PLS OXIMETRY MLT: CPT

## 2021-12-28 PROCEDURE — 99232 PR SUBSEQUENT HOSPITAL CARE,LEVL II: ICD-10-PCS | Mod: ,,, | Performed by: INTERNAL MEDICINE

## 2021-12-28 PROCEDURE — 20000000 HC ICU ROOM

## 2021-12-28 PROCEDURE — 27000221 HC OXYGEN, UP TO 24 HOURS

## 2021-12-28 PROCEDURE — 36415 COLL VENOUS BLD VENIPUNCTURE: CPT | Performed by: NURSE PRACTITIONER

## 2021-12-28 PROCEDURE — 63600175 PHARM REV CODE 636 W HCPCS: Performed by: EMERGENCY MEDICINE

## 2021-12-28 PROCEDURE — 85025 COMPLETE CBC W/AUTO DIFF WBC: CPT | Performed by: NURSE PRACTITIONER

## 2021-12-28 PROCEDURE — 25000003 PHARM REV CODE 250: Performed by: STUDENT IN AN ORGANIZED HEALTH CARE EDUCATION/TRAINING PROGRAM

## 2021-12-28 PROCEDURE — 83735 ASSAY OF MAGNESIUM: CPT | Performed by: NURSE PRACTITIONER

## 2021-12-28 PROCEDURE — 99291 CRITICAL CARE FIRST HOUR: CPT | Mod: ,,, | Performed by: INTERNAL MEDICINE

## 2021-12-28 PROCEDURE — 25000003 PHARM REV CODE 250: Performed by: NURSE PRACTITIONER

## 2021-12-28 PROCEDURE — 84100 ASSAY OF PHOSPHORUS: CPT | Performed by: NURSE PRACTITIONER

## 2021-12-28 PROCEDURE — 94660 CPAP INITIATION&MGMT: CPT

## 2021-12-28 PROCEDURE — 99291 PR CRITICAL CARE, E/M 30-74 MINUTES: ICD-10-PCS | Mod: ,,, | Performed by: INTERNAL MEDICINE

## 2021-12-28 PROCEDURE — 99232 SBSQ HOSP IP/OBS MODERATE 35: CPT | Mod: ,,, | Performed by: INTERNAL MEDICINE

## 2021-12-28 PROCEDURE — 80053 COMPREHEN METABOLIC PANEL: CPT | Performed by: NURSE PRACTITIONER

## 2021-12-28 PROCEDURE — 94640 AIRWAY INHALATION TREATMENT: CPT

## 2021-12-28 PROCEDURE — 63600175 PHARM REV CODE 636 W HCPCS: Performed by: STUDENT IN AN ORGANIZED HEALTH CARE EDUCATION/TRAINING PROGRAM

## 2021-12-28 PROCEDURE — 25000242 PHARM REV CODE 250 ALT 637 W/ HCPCS: Performed by: EMERGENCY MEDICINE

## 2021-12-28 PROCEDURE — C9113 INJ PANTOPRAZOLE SODIUM, VIA: HCPCS | Performed by: NURSE PRACTITIONER

## 2021-12-28 PROCEDURE — 25000003 PHARM REV CODE 250: Performed by: SPECIALIST

## 2021-12-28 RX ORDER — DILTIAZEM HCL 1 MG/ML
0-15 INJECTION, SOLUTION INTRAVENOUS CONTINUOUS
Status: DISCONTINUED | OUTPATIENT
Start: 2021-12-28 | End: 2021-12-31

## 2021-12-28 RX ORDER — POLYETHYLENE GLYCOL 3350, SODIUM CHLORIDE, SODIUM BICARBONATE, POTASSIUM CHLORIDE 420; 11.2; 5.72; 1.48 G/4L; G/4L; G/4L; G/4L
4000 POWDER, FOR SOLUTION ORAL ONCE
Status: COMPLETED | OUTPATIENT
Start: 2021-12-28 | End: 2021-12-28

## 2021-12-28 RX ADMIN — FUROSEMIDE 40 MG: 10 INJECTION, SOLUTION INTRAMUSCULAR; INTRAVENOUS at 09:12

## 2021-12-28 RX ADMIN — FLUTICASONE FUROATE AND VILANTEROL TRIFENATATE 1 PUFF: 100; 25 POWDER RESPIRATORY (INHALATION) at 12:12

## 2021-12-28 RX ADMIN — MIDODRINE HYDROCHLORIDE 5 MG: 2.5 TABLET ORAL at 03:12

## 2021-12-28 RX ADMIN — IPRATROPIUM BROMIDE AND ALBUTEROL SULFATE 3 ML: 2.5; .5 SOLUTION RESPIRATORY (INHALATION) at 12:12

## 2021-12-28 RX ADMIN — MIDODRINE HYDROCHLORIDE 5 MG: 2.5 TABLET ORAL at 09:12

## 2021-12-28 RX ADMIN — MUPIROCIN: 20 OINTMENT TOPICAL at 08:12

## 2021-12-28 RX ADMIN — IPRATROPIUM BROMIDE AND ALBUTEROL SULFATE 3 ML: 2.5; .5 SOLUTION RESPIRATORY (INHALATION) at 07:12

## 2021-12-28 RX ADMIN — APIXABAN 2.5 MG: 2.5 TABLET, FILM COATED ORAL at 09:12

## 2021-12-28 RX ADMIN — POLYETHYLENE GLYCOL 3350, SODIUM CHLORIDE, SODIUM BICARBONATE AND POTASSIUM CHLORIDE 4000 ML: 420; 5.72; 11.2; 1.48 POWDER, FOR SOLUTION ORAL at 08:12

## 2021-12-28 RX ADMIN — PANTOPRAZOLE SODIUM 40 MG: 40 INJECTION, POWDER, LYOPHILIZED, FOR SOLUTION INTRAVENOUS at 09:12

## 2021-12-28 RX ADMIN — DIGOXIN 0.12 MG: 125 TABLET ORAL at 09:12

## 2021-12-28 RX ADMIN — PIPERACILLIN AND TAZOBACTAM 4.5 G: 4; .5 INJECTION, POWDER, LYOPHILIZED, FOR SOLUTION INTRAVENOUS; PARENTERAL at 09:12

## 2021-12-28 RX ADMIN — IPRATROPIUM BROMIDE AND ALBUTEROL SULFATE 3 ML: 2.5; .5 SOLUTION RESPIRATORY (INHALATION) at 06:12

## 2021-12-28 RX ADMIN — THERA TABS 1 TABLET: TAB at 09:12

## 2021-12-28 RX ADMIN — PIPERACILLIN AND TAZOBACTAM 4.5 G: 4; .5 INJECTION, POWDER, LYOPHILIZED, FOR SOLUTION INTRAVENOUS; PARENTERAL at 03:12

## 2021-12-28 RX ADMIN — DILTIAZEM HYDROCHLORIDE 10 MG/HR: 5 INJECTION INTRAVENOUS at 05:12

## 2021-12-28 RX ADMIN — MUPIROCIN: 20 OINTMENT TOPICAL at 09:12

## 2021-12-28 RX ADMIN — MIRTAZAPINE 15 MG: 15 TABLET, FILM COATED ORAL at 08:12

## 2021-12-28 RX ADMIN — PIPERACILLIN AND TAZOBACTAM 4.5 G: 4; .5 INJECTION, POWDER, LYOPHILIZED, FOR SOLUTION INTRAVENOUS; PARENTERAL at 12:12

## 2021-12-28 RX ADMIN — DILTIAZEM HYDROCHLORIDE 15 MG/HR: 5 INJECTION INTRAVENOUS at 05:12

## 2021-12-28 RX ADMIN — CYANOCOBALAMIN TAB 1000 MCG 1000 MCG: 1000 TAB at 09:12

## 2021-12-28 RX ADMIN — PIPERACILLIN AND TAZOBACTAM 4.5 G: 4; .5 INJECTION, POWDER, LYOPHILIZED, FOR SOLUTION INTRAVENOUS; PARENTERAL at 11:12

## 2021-12-28 RX ADMIN — DILTIAZEM HYDROCHLORIDE 10 MG/HR: 5 INJECTION INTRAVENOUS at 09:12

## 2021-12-28 RX ADMIN — MIDODRINE HYDROCHLORIDE 5 MG: 2.5 TABLET ORAL at 08:12

## 2021-12-28 RX ADMIN — LORAZEPAM 0.5 MG: 2 INJECTION INTRAMUSCULAR; INTRAVENOUS at 12:12

## 2021-12-28 RX ADMIN — Medication 1000 UNITS: at 09:12

## 2021-12-28 RX ADMIN — POTASSIUM CHLORIDE 10 MEQ: 750 TABLET, EXTENDED RELEASE ORAL at 10:12

## 2021-12-28 NOTE — ASSESSMENT & PLAN NOTE
In setting of acute CHF exacerbation.  -BiPAP PRN  -NC O2  -CXR and ABG PRN  -Pulmonary consulted  -Continue IV diuresis

## 2021-12-28 NOTE — PROGRESS NOTES
[facility] Note    CC: Rectal bleeding    HPI 75 y.o. female with a past medical history of COPD chronic respiratory failure lymphoma cancer in her chart who presents the emergency room as referral from nursing home for bilateral pneumonia.  Patient was 79% on room air for EMS.  He she was in respiratory distress and they gave 1 DuoNeb, 1 regular albuterol, Solu-Medrol 125. Upon arrival patient is altered and confused and respiratory distress.  Non-rebreather has her oxygen level 98%.  History is limited otherwise secondary to respiratory distress        FURTHER HISTORY:  Above obtained from independent review of records from admitting provider as well as from direct discussion with nursing at bedside who states that patient had one episode of BRBPR today which she witnessed.  In addition, on my interview, I note the following:  Patient on BIPAP for NIPPV for pneumonia.  She had rectal bleeding, recent onset, mild to moderate amount, bright red, painless, with no alleviating/exacerbating factors.  She denies abdominal pain or emesis.  She had EGD recently with Dr. Hoover with erosive gastritis noted.  She states that she has never had colonoscopy.  No other acute GI issues.  She had chronic stable anemia on labs.     INTERVAL HISTORY:  No further bleeding per nursing.  Patient on NC today and appears quite comfortable.  Respiratory status markedly improved since yesterday.  States that she can prep tonight for colonoscopy tomorrow.    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    Respiratory failure, chronic          Review of Systems  General ROS: negative for - chills, fever or weight loss  Psychological ROS: negative for - hallucination, depression or suicidal ideation  Ophthalmic ROS: negative for - blurry vision, photophobia or eye pain  ENT ROS: negative for - epistaxis, sore throat  "or rhinorrhea  Respiratory ROS: + cough, shortness of breath  Cardiovascular ROS: no chest pain + dyspnea on exertion  Gastrointestinal ROS: + rectal bleeding x 1  Genito-Urinary ROS: no dysuria, trouble voiding, or hematuria  Musculoskeletal ROS: negative for - arthralgia, myalgia, weakness  Neurological ROS: no syncope or seizures; no ataxia  Dermatological ROS: negative for pruritis, rash and jaundice     Physical Examination  BP (!) 97/56   Pulse 81   Temp 99.86 °F (37.7 °C)   Resp (!) 30   Ht 5' 3" (1.6 m)   Wt 48.1 kg (106 lb 0.7 oz)   SpO2 98%   Breastfeeding No   BMI 18.78 kg/m²   General appearance: alert, cooperative, no distress, on BIPAP  HENT: Normocephalic, atraumatic, neck symmetrical, no nasal discharge   Eyes: conjunctivae/corneas clear, PERRL, EOM's intact, sclera anicteric  Lungs: coarse to auscultation bilaterally, no dullness to percussion bilaterally, symmetric expansion, breathing unlabored  Heart: regular rate and rhythm without rub; no displacement of the PMI   Abdomen: obese, NT  Extremities: extremities symmetric; no clubbing, cyanosis, or edema  Integument: Skin color, texture, turgor normal; no rashes; hair distrubution normal, no jaundice  Neurologic: Alert and oriented X 3, no focal sensory or motor neurologic deficits  Psychiatric: no pressured speech; normal affect; no evidence of impaired cognition, no anxiety/depression       Labs:  Lab Results   Component Value Date    WBC 13.03 (H) 12/28/2021    HGB 7.0 (L) 12/28/2021    HCT 23.8 (L) 12/28/2021    MCV 96 12/28/2021     12/28/2021         CMP  Sodium   Date Value Ref Range Status   12/28/2021 142 136 - 145 mmol/L Final     Potassium   Date Value Ref Range Status   12/28/2021 3.8 3.5 - 5.1 mmol/L Final     Chloride   Date Value Ref Range Status   12/28/2021 97 95 - 110 mmol/L Final     CO2   Date Value Ref Range Status   12/28/2021 31 (H) 23 - 29 mmol/L Final     Glucose   Date Value Ref Range Status   12/28/2021 121 " (H) 70 - 110 mg/dL Final     BUN   Date Value Ref Range Status   12/28/2021 22 8 - 23 mg/dL Final     Creatinine   Date Value Ref Range Status   12/28/2021 1.4 0.5 - 1.4 mg/dL Final     Calcium   Date Value Ref Range Status   12/28/2021 8.7 8.7 - 10.5 mg/dL Final     Total Protein   Date Value Ref Range Status   12/28/2021 5.6 (L) 6.0 - 8.4 g/dL Final     Albumin   Date Value Ref Range Status   12/28/2021 2.5 (L) 3.5 - 5.2 g/dL Final     Total Bilirubin   Date Value Ref Range Status   12/28/2021 0.4 0.1 - 1.0 mg/dL Final     Comment:     For infants and newborns, interpretation of results should be based  on gestational age, weight and in agreement with clinical  observations.    Premature Infant recommended reference ranges:  Up to 24 hours.............<8.0 mg/dL  Up to 48 hours............<12.0 mg/dL  3-5 days..................<15.0 mg/dL  6-29 days.................<15.0 mg/dL       Alkaline Phosphatase   Date Value Ref Range Status   12/28/2021 58 55 - 135 U/L Final     AST   Date Value Ref Range Status   12/28/2021 12 10 - 40 U/L Final     ALT   Date Value Ref Range Status   12/28/2021 10 10 - 44 U/L Final     Anion Gap   Date Value Ref Range Status   12/28/2021 14 8 - 16 mmol/L Final     eGFR if    Date Value Ref Range Status   12/28/2021 42 (A) >60 mL/min/1.73 m^2 Final     eGFR if non    Date Value Ref Range Status   12/28/2021 37 (A) >60 mL/min/1.73 m^2 Final     Comment:     Calculation used to obtain the estimated glomerular filtration  rate (eGFR) is the CKD-EPI equation.          Assessment:   1.  Bilateral pneumonia vs CHF  2.  Rectal bleeding  3.  BIPAP ongoing  4.  Chronic stable anemia  5.  Erosive gastritis on recent EGD     Plan:  1.  PPI  2.  Continue current management per primary team  3.  Clear liquid diet  4.  Colonoscopy tomorrow.    Ghassan Ambrocio MD  [facility]  [address], [address]  [place] [address]  Office: [phone]  Fax:  [phone]

## 2021-12-28 NOTE — PROGRESS NOTES
[facility]  Cardiology  Progress Note    Patient Name: Patrizia Valadez  MRN: [id number]  Admission Date: 12/26/2021  Hospital Length of Stay: 2 days  Code Status: DNR   Attending Physician: Alex Cunningham MD   Primary Care Physician: Anya Farr NP  Expected Discharge Date:   Principal Problem:<principal problem not specified>    Subjective:     Hospital Course:  The patient goes in and out of atrial fib.  She is on IV Cardizem for rate control.  Her hemoglobin continues to decline likely due to GI bleeding.        ROS  Objective:     Vital Signs (Most Recent):  Temp: 99.32 °F (37.4 °C) (12/28/21 1231)  Pulse: 79 (12/28/21 1238)  Resp: 18 (12/28/21 1238)  BP: (!) 108/54 (12/28/21 1200)  SpO2: 95 % (12/28/21 1238) Vital Signs (24h Range):  Temp:  [97.16 °F (36.2 °C)-99.86 °F (37.7 °C)] 99.32 °F (37.4 °C)  Pulse:  [] 79  Resp:  [12-55] 18  SpO2:  [90 %-100 %] 95 %  BP: ()/(46-70) 108/54     Weight: 48.1 kg (106 lb 0.7 oz)  Body mass index is 18.78 kg/m².    SpO2: 95 %  O2 Device (Oxygen Therapy): nasal cannula w/ humidification      Intake/Output Summary (Last 24 hours) at 12/28/2021 1717  Last data filed at 12/28/2021 1121  Gross per 24 hour   Intake 802.71 ml   Output 2075 ml   Net -1272.29 ml       Lines/Drains/Airways     Drain                 Urethral Catheter 12/26/21 2050 16 Fr. 1 day          Peripheral Intravenous Line                 Peripheral IV - Single Lumen 12/26/21 20 G Right Forearm 2 days         Peripheral IV - Single Lumen 12/27/21 1346 20 G Right Antecubital 1 day                Physical Exam the patient is resting comfortably in bed in no distress.  She denies any chest pain  Lungs are clear anteriorly  Cardiac sounds are distant without murmur or gallop  Abdomen soft and nontender  Extremities without edema    Significant Labs:   BMP:   Recent Labs   Lab 12/26/21  1902 12/27/21  0340 12/28/21  0352   * 205* 121*    141 142   K 4.5 4.3 3.8   CL 99  102 97   CO2 28 25 31*   BUN 21 22 22   CREATININE 1.4 1.2 1.4   CALCIUM 9.5 9.0 8.7   MG 2.0 2.6 2.2   , CMP   Recent Labs   Lab 12/26/21  1902 12/27/21  0340 12/28/21  0352    141 142   K 4.5 4.3 3.8   CL 99 102 97   CO2 28 25 31*   * 205* 121*   BUN 21 22 22   CREATININE 1.4 1.2 1.4   CALCIUM 9.5 9.0 8.7   PROT 6.6 5.8* 5.6*   ALBUMIN 2.8* 2.5* 2.5*   BILITOT 0.4 0.3 0.4   ALKPHOS 81 71 58   AST 13 12 12   ALT 12 10 10   ANIONGAP 13 14 14   ESTGFRAFRICA 42* 51* 42*   EGFRNONAA 37* 44* 37*   , CBC   Recent Labs   Lab 12/27/21  0340 12/27/21  0340 12/27/21  1216 12/27/21  1216 12/28/21  0352   WBC 7.99  --  9.88  --  13.03*   HGB 7.6*  --  8.0*  --  7.0*   HCT 27.2*   < > 28.5*   < > 23.8*     --  368  --  343    < > = values in this interval not displayed.    and Troponin   Recent Labs   Lab 12/26/21 1902   TROPONINI 0.016       Significant Imaging: ECHO 9/9/2021:  · The estimated PA systolic pressure is 46 mmHg.  · Concentric remodeling and normal systolic function.  · The estimated ejection fraction is 55%.  · Normal left ventricular diastolic function.  · Normal right ventricular size with normal right ventricular systolic function.  · Moderate mitral regurgitation.  · Moderate to severe tricuspid regurgitation.      Assessment and Plan:  Paroxysmal atrial fibrillation.  The patient's rate is controlled with IV Cardizem.  Anticoagulation is contraindicated at this time because of active bleeding.  COPD  Acute blood loss.  Erosive gastritis.  The plan is for continued rate control with Cardizem.  She can be changed to oral medications when stable.         Active Diagnoses:    Diagnosis Date Noted POA    Rectal bleeding [K62.5]  No    Chronic anemia [D64.9] 10/22/2021 Yes    Chronic hypotension on midodrine [I95.9] 10/22/2021 Yes     Chronic    Acute on chronic heart failure with preserved ejection fraction [I50.33] 10/14/2021 Yes    Paroxysmal atrial fibrillation [I48.0] 10/12/2021 Yes     Acute on chronic respiratory failure with hypoxia and hypercapnia [J96.21, J96.22] 08/03/2021 Yes    COPD (chronic obstructive pulmonary disease) [J44.9] 08/03/2021 Yes     Chronic      Problems Resolved During this Admission:       VTE Risk Mitigation (From admission, onward)         Ordered     Place sequential compression device  Until discontinued         12/27/21 0302                Adonis Mcmullen MD  Cardiology  [facility]

## 2021-12-28 NOTE — SUBJECTIVE & OBJECTIVE
Interval History: able to be weaned from BiPAP; on 4 L NC; continuing antibiotics and IV diuresis; apixaban held; Cardiology, Pulmonary and GI following; Hgb 7.    Review of Systems   Constitutional: Negative for chills and fever.   HENT: Negative for congestion and sore throat.    Eyes: Negative for visual disturbance.   Respiratory: Positive for shortness of breath and wheezing. Negative for cough.    Cardiovascular: Negative for chest pain and palpitations.   Gastrointestinal: Positive for blood in stool. Negative for abdominal pain, nausea and vomiting.   Endocrine: Negative for cold intolerance and heat intolerance.   Genitourinary: Negative for dysuria and hematuria.   Musculoskeletal: Negative for arthralgias and myalgias.   Skin: Negative for pallor and rash.   Neurological: Negative for tremors and seizures.   Hematological: Negative for adenopathy. Does not bruise/bleed easily.   Psychiatric/Behavioral: Negative for hallucinations.   All other systems reviewed and are negative.    Objective:     Vital Signs (Most Recent):  Temp: 99.32 °F (37.4 °C) (12/28/21 1231)  Pulse: 79 (12/28/21 1238)  Resp: 18 (12/28/21 1238)  BP: (!) 108/54 (12/28/21 1200)  SpO2: 95 % (12/28/21 1238) Vital Signs (24h Range):  Temp:  [97.16 °F (36.2 °C)-99.86 °F (37.7 °C)] 99.32 °F (37.4 °C)  Pulse:  [] 79  Resp:  [12-55] 18  SpO2:  [90 %-100 %] 95 %  BP: ()/(46-70) 108/54     Weight: 48.1 kg (106 lb 0.7 oz)  Body mass index is 18.78 kg/m².    Intake/Output Summary (Last 24 hours) at 12/28/2021 1456  Last data filed at 12/28/2021 1121  Gross per 24 hour   Intake 1096.44 ml   Output 2325 ml   Net -1228.56 ml      Physical Exam  Vitals and nursing note reviewed.   Constitutional:       General: She is awake. She is in acute distress.      Appearance: She is well-developed and underweight. She is ill-appearing.      Interventions: Face mask in place.   HENT:      Head: Normocephalic and atraumatic.      Right Ear: External  ear normal.      Left Ear: External ear normal.      Nose: Nose normal.      Mouth/Throat:      Mouth: Mucous membranes are moist.      Pharynx: Oropharynx is clear.   Eyes:      Extraocular Movements: Extraocular movements intact.      Conjunctiva/sclera: Conjunctivae normal.   Neck:      Thyroid: No thyromegaly.      Vascular: No JVD.   Cardiovascular:      Rate and Rhythm: Normal rate and regular rhythm.      Pulses: Normal pulses.      Heart sounds: Normal heart sounds, S1 normal and S2 normal. Heart sounds not distant. No murmur heard.  No friction rub. No gallop.    Pulmonary:      Effort: Tachypnea present. No respiratory distress.      Breath sounds: Decreased air movement present. Examination of the right-lower field reveals decreased breath sounds. Examination of the left-lower field reveals decreased breath sounds. Decreased breath sounds present. No wheezing, rhonchi or rales.      Comments: 4 L NC.  Abdominal:      General: Abdomen is flat. Bowel sounds are normal. There is no distension.      Palpations: Abdomen is soft. There is no mass.      Tenderness: There is no abdominal tenderness.   Genitourinary:     Comments: Stout.  Musculoskeletal:         General: Normal range of motion.      Cervical back: Full passive range of motion without pain, normal range of motion and neck supple.      Right lower leg: No edema.      Left lower leg: No edema.   Skin:     General: Skin is warm and dry.      Capillary Refill: Capillary refill takes less than 2 seconds.   Neurological:      General: No focal deficit present.      Mental Status: She is alert and oriented to person, place, and time. Mental status is at baseline.      Cranial Nerves: No cranial nerve deficit.      Motor: Motor function is intact.      Coordination: Coordination is intact.   Psychiatric:         Mood and Affect: Mood normal.         Behavior: Behavior normal. Behavior is cooperative.         Significant Labs: All pertinent labs within the  past 24 hours have been reviewed.    Significant Imaging: I have reviewed all pertinent imaging results/findings within the past 24 hours.

## 2021-12-28 NOTE — PROGRESS NOTES
[facility] Hospital Medicine  Progress Note    Patient Name: Patrizia Valadez  MRN: [id number]  Patient Class: IP- Inpatient   Admission Date: 12/26/2021  Length of Stay: 2 days  Attending Physician: Alex Cunningham MD  Primary Care Provider: Anya Farr NP        Subjective:     Principal Problem:<principal problem not specified>        HPI:  Patrizia Valadez is a 75-year-old female with a past medical history of COPD, chronic respiratory failure on oxygen at home, lymphoma, and past surgical history of cholecystectomy who presented to the ED with a complaint of shortness of breath. HPI is limited due to acuity of condition. She was sent from a nursing some for decreased mental status as well as low oxygen saturation and respiratory distress. Per ED notes, EMS found the patient to have a pulse ox of 79% on arrival. She received a duoneb treatment, albuterol treatment and solumedrol 125 mg prior to arrival.     Upon arrival to the ED, the patient was found to be altered and confused, and in respiratory distress. She was placed on a non re breather mask, which improved her oxygenation to 98% but she did require non invasive ventilation. She was placed on BiPAP at 50%, 14/6, which has improved her respiratory status. She is more alert and awake, and is able to answer simple questions appropriately. Chest xray revealed bilateral pneumonia along with chronic findings. CBC revealed leukocytosis of 12.87 and what appears to be chronic anemia. Initial lactic acid level was elevated at 2.4, but has decreased to 1.5. Troponin negative, urine negative. BNP elevated at 1540. She was given one dose of Lasix 40 mg IV. Due to continued use of BiPAP and high acuity, she will be admitted to the ICU. The patient does state she is a DNR, review of medical record does show past recent admissions as a DNR. She does not have paper work with her today. Hospital Medicine consulted for admission and further  management.          Overview/Hospital Course:  Patrizia Valadez is a 75 year old female with a past medical history of COPD, chronic respiratory failure on oxygen at home, lymphoma, HANNAH, Afib, and CHF who presented to the ED with a complaint of shortness of breath. She was found to have an acute on chronic CHF exacerbation and is being treated with IV Lasix. She has required BiPAP therapy and was admitted to the ICU. She is empiric Zosyn as well for a possible superimposed pneumonia (WBC  and procalcitonin elevated.) Pulmonary and Cardiology have also been consulted. Her course was complicated by Afib for which the patient is on a diltiazem infusion. Anticoagulation with Eliquis was held given a witnessed bloody bowel movement. GI has been consulted and may perform colonoscopy before discharge.       Interval History: able to be weaned from BiPAP; on 4 L NC; continuing antibiotics and IV diuresis; apixaban held; Cardiology, Pulmonary and GI following; Hgb 7.    Review of Systems   Constitutional: Negative for chills and fever.   HENT: Negative for congestion and sore throat.    Eyes: Negative for visual disturbance.   Respiratory: Positive for shortness of breath and wheezing. Negative for cough.    Cardiovascular: Negative for chest pain and palpitations.   Gastrointestinal: Positive for blood in stool. Negative for abdominal pain, nausea and vomiting.   Endocrine: Negative for cold intolerance and heat intolerance.   Genitourinary: Negative for dysuria and hematuria.   Musculoskeletal: Negative for arthralgias and myalgias.   Skin: Negative for pallor and rash.   Neurological: Negative for tremors and seizures.   Hematological: Negative for adenopathy. Does not bruise/bleed easily.   Psychiatric/Behavioral: Negative for hallucinations.   All other systems reviewed and are negative.    Objective:     Vital Signs (Most Recent):  Temp: 99.32 °F (37.4 °C) (12/28/21 1231)  Pulse: 79 (12/28/21 1238)  Resp: 18 (12/28/21  1238)  BP: (!) 108/54 (12/28/21 1200)  SpO2: 95 % (12/28/21 1238) Vital Signs (24h Range):  Temp:  [97.16 °F (36.2 °C)-99.86 °F (37.7 °C)] 99.32 °F (37.4 °C)  Pulse:  [] 79  Resp:  [12-55] 18  SpO2:  [90 %-100 %] 95 %  BP: ()/(46-70) 108/54     Weight: 48.1 kg (106 lb 0.7 oz)  Body mass index is 18.78 kg/m².    Intake/Output Summary (Last 24 hours) at 12/28/2021 1456  Last data filed at 12/28/2021 1121  Gross per 24 hour   Intake 1096.44 ml   Output 2325 ml   Net -1228.56 ml      Physical Exam  Vitals and nursing note reviewed.   Constitutional:       General: She is awake. She is in acute distress.      Appearance: She is well-developed and underweight. She is ill-appearing.      Interventions: Face mask in place.   HENT:      Head: Normocephalic and atraumatic.      Right Ear: External ear normal.      Left Ear: External ear normal.      Nose: Nose normal.      Mouth/Throat:      Mouth: Mucous membranes are moist.      Pharynx: Oropharynx is clear.   Eyes:      Extraocular Movements: Extraocular movements intact.      Conjunctiva/sclera: Conjunctivae normal.   Neck:      Thyroid: No thyromegaly.      Vascular: No JVD.   Cardiovascular:      Rate and Rhythm: Normal rate and regular rhythm.      Pulses: Normal pulses.      Heart sounds: Normal heart sounds, S1 normal and S2 normal. Heart sounds not distant. No murmur heard.  No friction rub. No gallop.    Pulmonary:      Effort: Tachypnea present. No respiratory distress.      Breath sounds: Decreased air movement present. Examination of the right-lower field reveals decreased breath sounds. Examination of the left-lower field reveals decreased breath sounds. Decreased breath sounds present. No wheezing, rhonchi or rales.      Comments: 4 L NC.  Abdominal:      General: Abdomen is flat. Bowel sounds are normal. There is no distension.      Palpations: Abdomen is soft. There is no mass.      Tenderness: There is no abdominal tenderness.   Genitourinary:      Comments: Girish.  Musculoskeletal:         General: Normal range of motion.      Cervical back: Full passive range of motion without pain, normal range of motion and neck supple.      Right lower leg: No edema.      Left lower leg: No edema.   Skin:     General: Skin is warm and dry.      Capillary Refill: Capillary refill takes less than 2 seconds.   Neurological:      General: No focal deficit present.      Mental Status: She is alert and oriented to person, place, and time. Mental status is at baseline.      Cranial Nerves: No cranial nerve deficit.      Motor: Motor function is intact.      Coordination: Coordination is intact.   Psychiatric:         Mood and Affect: Mood normal.         Behavior: Behavior normal. Behavior is cooperative.         Significant Labs: All pertinent labs within the past 24 hours have been reviewed.    Significant Imaging: I have reviewed all pertinent imaging results/findings within the past 24 hours.      Assessment/Plan:      Rectal bleeding  -GI consulted  -Trend CBC  -Hold anticoagulation      Chronic hypotension on midodrine  -Continue midodrine      Chronic anemia  -Monitor CBC in setting of bloody bowel movement  -Hold anticoagulation    Acute on chronic heart failure with preserved ejection fraction  -IV diuresis  -Telemetry  -Cardiology consulted  -Strict I's and O's  -K > 4 and Mg > 2    Paroxysmal atrial fibrillation  -Telemetry  -Diltiazem infusion  -Cardiology consulted  -Hold Eliquis in getting of bloody bowel movement      Multifocal atrial tachycardia        Acute on chronic respiratory failure with hypoxia and hypercapnia  In setting of acute CHF exacerbation.  -BiPAP PRN  -NC O2  -CXR and ABG PRN  -Pulmonary consulted  -Continue IV diuresis    COPD (chronic obstructive pulmonary disease)  -Continue home inhalers  -NC O2          VTE Risk Mitigation (From admission, onward)         Ordered     Place sequential compression device  Until discontinued         12/27/21  0302                Discharge Planning   VAZQUEZ: 12/29/2021     Code Status: DNR   Is the patient medically ready for discharge?:     Reason for patient still in hospital (select all that apply): Patient trending condition, Laboratory test, Treatment, Consult recommendations and Pending disposition  Discharge Plan A: Return to nursing home            Critical care time spent on the evaluation and treatment of severe organ dysfunction, review of pertinent labs and imaging studies, discussions with consulting providers and discussions with patient/family: 34 minutes.      Alex Cunningham MD  Department of Hospital Medicine   [facility]

## 2021-12-28 NOTE — CONSULTS
[facility]     CC: Rectal bleeding    HPI 75 y.o. female with a past medical history of COPD chronic respiratory failure lymphoma cancer in her chart who presents the emergency room as referral from nursing home for bilateral pneumonia.  Patient was 79% on room air for EMS.  He she was in respiratory distress and they gave 1 DuoNeb, 1 regular albuterol, Solu-Medrol 125. Upon arrival patient is altered and confused and respiratory distress.  Non-rebreather has her oxygen level 98%.  History is limited otherwise secondary to respiratory distress       FURTHER HISTORY:  Above obtained from independent review of records from admitting provider as well as from direct discussion with nursing at bedside who states that patient had one episode of BRBPR today which she witnessed.  In addition, on my interview, I note the following:  Patient on BIPAP for NIPPV for pneumonia.  She had rectal bleeding, recent onset, mild to moderate amount, bright red, painless, with no alleviating/exacerbating factors.  She denies abdominal pain or emesis.  She had EGD recently with Dr. Hoover with erosive gastritis noted.  She states that she has never had colonoscopy.  No other acute GI issues.  She had chronic stable anemia on labs.       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    Respiratory failure, chronic        Past Surgical History:   Procedure Laterality Date    CHOLECYSTECTOMY      ESOPHAGOGASTRODUODENOSCOPY N/A 10/23/2021    Procedure: EGD (ESOPHAGOGASTRODUODENOSCOPY);  Surgeon: Jaja Hoover MD;  Location: [facility];  Service: Endoscopy;  Laterality: N/A;    HYSTERECTOMY      NO PAST SURGERIES         Social History     Tobacco Use    Smoking status: Former Smoker     Packs/day: 0.25     Years: 60.00     Pack years: 15.00     Types: Cigarettes     Quit date: 8/18/2012     Years since  "quittin.3    Smokeless tobacco: Never Used    Tobacco comment: Ex smoker    Substance Use Topics    Alcohol use: Not Currently       Family History   Problem Relation Age of Onset    Hypertension Mother        Review of Systems  General ROS: negative for - chills, fever or weight loss  Psychological ROS: negative for - hallucination, depression or suicidal ideation  Ophthalmic ROS: negative for - blurry vision, photophobia or eye pain  ENT ROS: negative for - epistaxis, sore throat or rhinorrhea  Respiratory ROS: + cough, shortness of breath  Cardiovascular ROS: no chest pain + dyspnea on exertion  Gastrointestinal ROS: + rectal bleeding x 1  Genito-Urinary ROS: no dysuria, trouble voiding, or hematuria  Musculoskeletal ROS: negative for - arthralgia, myalgia, weakness  Neurological ROS: no syncope or seizures; no ataxia  Dermatological ROS: negative for pruritis, rash and jaundice    Physical Examination  BP (!) 97/56   Pulse 81   Temp 99.86 °F (37.7 °C)   Resp (!) 30   Ht 5' 3" (1.6 m)   Wt 48.1 kg (106 lb 0.7 oz)   SpO2 98%   Breastfeeding No   BMI 18.78 kg/m²   General appearance: alert, cooperative, no distress, on BIPAP  HENT: Normocephalic, atraumatic, neck symmetrical, no nasal discharge   Eyes: conjunctivae/corneas clear, PERRL, EOM's intact, sclera anicteric  Lungs: coarse to auscultation bilaterally, no dullness to percussion bilaterally, symmetric expansion, breathing unlabored  Heart: regular rate and rhythm without rub; no displacement of the PMI   Abdomen: obese, NT  Extremities: extremities symmetric; no clubbing, cyanosis, or edema  Integument: Skin color, texture, turgor normal; no rashes; hair distrubution normal, no jaundice  Neurologic: Alert and oriented X 3, no focal sensory or motor neurologic deficits  Psychiatric: no pressured speech; normal affect; no evidence of impaired cognition, no anxiety/depression     Labs:  Lab Results   Component Value Date    WBC 9.88 " 12/27/2021    HGB 8.0 (L) 12/27/2021    HCT 28.5 (L) 12/27/2021    MCV 99 (H) 12/27/2021     12/27/2021         CMP  Sodium   Date Value Ref Range Status   12/27/2021 141 136 - 145 mmol/L Final     Potassium   Date Value Ref Range Status   12/27/2021 4.3 3.5 - 5.1 mmol/L Final     Chloride   Date Value Ref Range Status   12/27/2021 102 95 - 110 mmol/L Final     CO2   Date Value Ref Range Status   12/27/2021 25 23 - 29 mmol/L Final     Glucose   Date Value Ref Range Status   12/27/2021 205 (H) 70 - 110 mg/dL Final     BUN   Date Value Ref Range Status   12/27/2021 22 8 - 23 mg/dL Final     Creatinine   Date Value Ref Range Status   12/27/2021 1.2 0.5 - 1.4 mg/dL Final     Calcium   Date Value Ref Range Status   12/27/2021 9.0 8.7 - 10.5 mg/dL Final     Total Protein   Date Value Ref Range Status   12/27/2021 5.8 (L) 6.0 - 8.4 g/dL Final     Albumin   Date Value Ref Range Status   12/27/2021 2.5 (L) 3.5 - 5.2 g/dL Final     Total Bilirubin   Date Value Ref Range Status   12/27/2021 0.3 0.1 - 1.0 mg/dL Final     Comment:     For infants and newborns, interpretation of results should be based  on gestational age, weight and in agreement with clinical  observations.    Premature Infant recommended reference ranges:  Up to 24 hours.............<8.0 mg/dL  Up to 48 hours............<12.0 mg/dL  3-5 days..................<15.0 mg/dL  6-29 days.................<15.0 mg/dL       Alkaline Phosphatase   Date Value Ref Range Status   12/27/2021 71 55 - 135 U/L Final     AST   Date Value Ref Range Status   12/27/2021 12 10 - 40 U/L Final     ALT   Date Value Ref Range Status   12/27/2021 10 10 - 44 U/L Final     Anion Gap   Date Value Ref Range Status   12/27/2021 14 8 - 16 mmol/L Final     eGFR if    Date Value Ref Range Status   12/27/2021 51 (A) >60 mL/min/1.73 m^2 Final     eGFR if non    Date Value Ref Range Status   12/27/2021 44 (A) >60 mL/min/1.73 m^2 Final     Comment:      Calculation used to obtain the estimated glomerular filtration  rate (eGFR) is the CKD-EPI equation.            Imaging:  CXR was independently visualized and reviewed by me and showed pneumonia vs CHF.    I have personally reviewed these images    Case discussed with nursing as above.    Assessment:   1.  Bilateral pneumonia vs CHF  2.  Rectal bleeding  3.  BIPAP ongoing  4.  Chronic stable anemia  5.  Erosive gastritis on recent EGD    Plan:  1.  PPI  2.  Continue current management per primary team  3.  Diet as tolerated  4.  Continue to monitor.  Would defer endoscopy for now given respiratory illness.  Recommend EGD/colonoscopy before discharge once respiratory status improved.  If any severe bleeding occurs and urgent endoscopy needs to be undertaken then this is a possibility, but would defer for now given that her GI symptoms are minor.  5.  Trend CBC  6.  Further recommendations to follow after above.  7.  Communication will be sent to the referring provider, Dr. Cunningham regarding my assessment and plan on this patient via EPIC.      Ghassan Ambrocio MD  [facility]  [address], [address]  [place] [address]  Office: [phone]  Fax: [phone]

## 2021-12-28 NOTE — NURSING
Shift Summary:     Patient on continuous BiPap 14/6 50% tolerated well during the shift. Patient voiced complaints that Benadryl caused tremors and worsening restlessness. Ativan administered twice during shift. Patient noted to become more fidgety with increased leg twitching and restlessness. Agitation, restlessness, and fidgeting much improved after 2nd dose of ativan.     HR controlled on Cardizem infusion; irregular rhythm continues. IV sites remains intact and patent. No blood noted on incontinence pad.     Afebrile during shift.    1100cc urine output noted.     Bedside report given to oncoming shift.     Patient's daughter Aan Kiser, updated at end of shift.

## 2021-12-28 NOTE — CARE UPDATE
12/27/21 1927   Patient Assessment/Suction   Level of Consciousness (AVPU) alert   Respiratory Effort Unlabored   Expansion/Accessory Muscles/Retractions no use of accessory muscles;no retractions;expansion symmetric   All Lung Fields Breath Sounds Anterior:;Lateral:;diminished   Rhythm/Pattern, Respiratory assisted mechanically   Cough Frequency no cough   PRE-TX-O2   O2 Device (Oxygen Therapy) BiPAP   $ Is the patient on Low Flow Oxygen? Yes   Oxygen Concentration (%) 50   SpO2 99 %   Pulse Oximetry Type Continuous   $ Pulse Oximetry - Multiple Charge Pulse Oximetry - Multiple   Pulse 92   Resp (!) 29  (pt currently talking with family member)   Aerosol Therapy   $ Aerosol Therapy Charges Aerosol Treatment   Respiratory Treatment Status (SVN) given   Treatment Route (SVN) in-line   Patient Position (SVN) HOB elevated   Post Treatment Assessment (SVN) breath sounds unchanged   Signs of Intolerance (SVN) none   Breath Sounds Post-Respiratory Treatment   Throughout All Fields Post-Treatment All Fields   Throughout All Fields Post-Treatment no change   Post-treatment Heart Rate (beats/min) 97   Post-treatment Resp Rate (breaths/min) 24   Skin Integrity   $ Wound Care Tech Time 15 min   Area Observed Bridge of nose   Skin Appearance without discoloration   Barrier used? Liquid Filled Cushion   Preset CPAP/BiPAP Settings   Mode Of Delivery BiPAP   $ CPAP/BiPAP Daily Charge BiPAP/CPAP Daily   $ Initial CPAP/BiPAP Setup? No   $ Is patient using? Yes   Size of Mask Small/Medium   Sized Appropriately? Yes   Equipment Type V60   Airway Device Type medium full face mask   Humidifier not applicable   Ipap 14   EPAP (cm H2O) 6   Pressure Support (cm H2O) 8   Set Rate (Breaths/Min) 10   ITime (sec) 0.8   Rise Time (sec) 1   Patient CPAP/BiPAP Settings   Timed Inspiration (Sec) 0.8   IPAP Rise Time (sec) 1   RR Total (Breaths/Min) 29   Tidal Volume (mL) 1018   VE Minute Ventilation (L/min) 16.4 L/min   Peak Inspiratory  Pressure (cm H2O) 15   TiTOT (%) 31   Total Leak (L/Min) 42   Patient Trigger - ST Mode Only (%) 97   CPAP/BiPAP Backup Settings   Backup Rate 10 breaths per minute (bpm)   CPAP/BiPAP Alarms   High Pressure (cm H2O) 22   Low Pressure (cm H2O) 5   Minute Ventilation (L/Min) 3   High RR (breaths/min) 55   Low RR (breaths/min) 10   Apnea (Sec) 20

## 2021-12-28 NOTE — HOSPITAL COURSE
Patrizia Valadez is a 75 year old female with a past medical history of COPD, chronic respiratory failure on oxygen at home, lymphoma, HANNAH, Afib, and CHF who presented to the ED with a complaint of shortness of breath. She was found to have an acute on chronic CHF exacerbation and is being treated with IV Lasix. She has required BiPAP therapy and was admitted to the ICU. She was placed empiric Zosyn as well for a possible superimposed pneumonia (WBC and procalcitonin elevated.) Pulmonary and Cardiology have also been consulted. Her course was complicated by Afib for which the patient is on a diltiazem infusion. Anticoagulation with Eliquis was held given a witnessed bloody bowel movement. GI has been consulted and will perform colonoscopy 12/29. She was diuresed roughly 2.2 L and she was able to be weaned off BiPAP to 2 L NC. Her course was complicated by JOSE likely secondary to over-diuresis. Lasix has held and a 500 cc LR bolus was ordered. PT/OT was consulted and the patient was transferred out of the ICU 12/29.

## 2021-12-28 NOTE — RESPIRATORY THERAPY
Patient remains on Bipap with Ipap 14, Epap +6 and FI02 .35.  Hr 80 and 02 saturation 100%.  Patient receiving aerosol tx via duoneb now and q6hr and Breo qday.

## 2021-12-28 NOTE — ASSESSMENT & PLAN NOTE
-Telemetry  -Diltiazem infusion  -Cardiology consulted  -Hold Eliquis in getting of bloody bowel movement

## 2021-12-28 NOTE — CONSULTS
"Visited pt per Spiritual care consult for "extra support/company." Pt welcomed me into the room, she was awake, alert, lying in bed with continuous bi-pap on; she was very talkative albeit difficult to understand all she was saying due to the bi-pap, but she was chatty indeed.    Pt confirmed she is Caodaism, per Epic and she also said she's been hospitalized recently 3x between this hospital and the other. She said a lot without me being about to understand. I think she was sharing her health journey and the care she's received-- good and bad care; Provided compassionate presence and listening ear for her, as I held her hand. Listened as if I got every word; pt welcomed me to pray over/with/for her and after the prayer, she said Amen and said that she prays to God every day.    Encouraged pt to relax (the machines seem to chime more as she spoke a lot and quickly), allowing her body to rest. She agreed. Pt thanked me for the visit and told me to have a blessed day.  continue to be available as needed. Thank you for the consult. Lord, in your mercy.  "

## 2021-12-29 ENCOUNTER — ANESTHESIA EVENT (OUTPATIENT)
Dept: ENDOSCOPY | Facility: HOSPITAL | Age: 76
DRG: 291 | End: 2021-12-29
Payer: MEDICARE

## 2021-12-29 ENCOUNTER — ANESTHESIA (OUTPATIENT)
Dept: ENDOSCOPY | Facility: HOSPITAL | Age: 76
DRG: 291 | End: 2021-12-29
Payer: MEDICARE

## 2021-12-29 PROBLEM — N17.9 AKI (ACUTE KIDNEY INJURY): Status: ACTIVE | Noted: 2021-12-29

## 2021-12-29 LAB
ALBUMIN SERPL BCP-MCNC: 2.7 G/DL (ref 3.5–5.2)
ALP SERPL-CCNC: 56 U/L (ref 55–135)
ALT SERPL W/O P-5'-P-CCNC: 9 U/L (ref 10–44)
ANION GAP SERPL CALC-SCNC: 14 MMOL/L (ref 8–16)
ANION GAP SERPL CALC-SCNC: 15 MMOL/L (ref 8–16)
AST SERPL-CCNC: 13 U/L (ref 10–40)
BASOPHILS # BLD AUTO: 0.01 K/UL (ref 0–0.2)
BASOPHILS NFR BLD: 0.1 % (ref 0–1.9)
BILIRUB SERPL-MCNC: 0.5 MG/DL (ref 0.1–1)
BUN SERPL-MCNC: 16 MG/DL (ref 8–23)
BUN SERPL-MCNC: 21 MG/DL (ref 8–23)
CALCIUM SERPL-MCNC: 8.6 MG/DL (ref 8.7–10.5)
CALCIUM SERPL-MCNC: 9.1 MG/DL (ref 8.7–10.5)
CHLORIDE SERPL-SCNC: 92 MMOL/L (ref 95–110)
CHLORIDE SERPL-SCNC: 94 MMOL/L (ref 95–110)
CO2 SERPL-SCNC: 34 MMOL/L (ref 23–29)
CO2 SERPL-SCNC: 36 MMOL/L (ref 23–29)
CREAT SERPL-MCNC: 1.4 MG/DL (ref 0.5–1.4)
CREAT SERPL-MCNC: 1.6 MG/DL (ref 0.5–1.4)
DIFFERENTIAL METHOD: ABNORMAL
EOSINOPHIL # BLD AUTO: 0.1 K/UL (ref 0–0.5)
EOSINOPHIL NFR BLD: 0.8 % (ref 0–8)
ERYTHROCYTE [DISTWIDTH] IN BLOOD BY AUTOMATED COUNT: 15.7 % (ref 11.5–14.5)
EST. GFR  (AFRICAN AMERICAN): 36 ML/MIN/1.73 M^2
EST. GFR  (AFRICAN AMERICAN): 42 ML/MIN/1.73 M^2
EST. GFR  (NON AFRICAN AMERICAN): 31 ML/MIN/1.73 M^2
EST. GFR  (NON AFRICAN AMERICAN): 37 ML/MIN/1.73 M^2
GLUCOSE SERPL-MCNC: 96 MG/DL (ref 70–110)
GLUCOSE SERPL-MCNC: 99 MG/DL (ref 70–110)
HCT VFR BLD AUTO: 24.1 % (ref 37–48.5)
HGB BLD-MCNC: 7.1 G/DL (ref 12–16)
IMM GRANULOCYTES # BLD AUTO: 0.04 K/UL (ref 0–0.04)
IMM GRANULOCYTES NFR BLD AUTO: 0.4 % (ref 0–0.5)
LYMPHOCYTES # BLD AUTO: 1.7 K/UL (ref 1–4.8)
LYMPHOCYTES NFR BLD: 16.4 % (ref 18–48)
MAGNESIUM SERPL-MCNC: 2 MG/DL (ref 1.6–2.6)
MCH RBC QN AUTO: 27.8 PG (ref 27–31)
MCHC RBC AUTO-ENTMCNC: 29.5 G/DL (ref 32–36)
MCV RBC AUTO: 95 FL (ref 82–98)
MONOCYTES # BLD AUTO: 0.6 K/UL (ref 0.3–1)
MONOCYTES NFR BLD: 5.7 % (ref 4–15)
NEUTROPHILS # BLD AUTO: 7.8 K/UL (ref 1.8–7.7)
NEUTROPHILS NFR BLD: 76.6 % (ref 38–73)
NRBC BLD-RTO: 0 /100 WBC
PHOSPHATE SERPL-MCNC: 2.5 MG/DL (ref 2.7–4.5)
PLATELET # BLD AUTO: 350 K/UL (ref 150–450)
PMV BLD AUTO: 9.3 FL (ref 9.2–12.9)
POTASSIUM SERPL-SCNC: 2.8 MMOL/L (ref 3.5–5.1)
POTASSIUM SERPL-SCNC: 4.2 MMOL/L (ref 3.5–5.1)
PROT SERPL-MCNC: 5.9 G/DL (ref 6–8.4)
RBC # BLD AUTO: 2.55 M/UL (ref 4–5.4)
SODIUM SERPL-SCNC: 142 MMOL/L (ref 136–145)
SODIUM SERPL-SCNC: 143 MMOL/L (ref 136–145)
WBC # BLD AUTO: 10.23 K/UL (ref 3.9–12.7)

## 2021-12-29 PROCEDURE — 88305 TISSUE EXAM BY PATHOLOGIST: CPT | Mod: 59 | Performed by: PATHOLOGY

## 2021-12-29 PROCEDURE — 11000001 HC ACUTE MED/SURG PRIVATE ROOM

## 2021-12-29 PROCEDURE — 63600175 PHARM REV CODE 636 W HCPCS: Performed by: STUDENT IN AN ORGANIZED HEALTH CARE EDUCATION/TRAINING PROGRAM

## 2021-12-29 PROCEDURE — 88342 CHG IMMUNOCYTOCHEMISTRY: ICD-10-PCS | Mod: 26,,, | Performed by: PATHOLOGY

## 2021-12-29 PROCEDURE — 94640 AIRWAY INHALATION TREATMENT: CPT

## 2021-12-29 PROCEDURE — 25000242 PHARM REV CODE 250 ALT 637 W/ HCPCS: Performed by: STUDENT IN AN ORGANIZED HEALTH CARE EDUCATION/TRAINING PROGRAM

## 2021-12-29 PROCEDURE — 37000009 HC ANESTHESIA EA ADD 15 MINS: Performed by: INTERNAL MEDICINE

## 2021-12-29 PROCEDURE — 25000003 PHARM REV CODE 250: Performed by: STUDENT IN AN ORGANIZED HEALTH CARE EDUCATION/TRAINING PROGRAM

## 2021-12-29 PROCEDURE — 25500020 PHARM REV CODE 255

## 2021-12-29 PROCEDURE — 80048 BASIC METABOLIC PNL TOTAL CA: CPT | Performed by: INTERNAL MEDICINE

## 2021-12-29 PROCEDURE — 83735 ASSAY OF MAGNESIUM: CPT | Performed by: NURSE PRACTITIONER

## 2021-12-29 PROCEDURE — 63600175 PHARM REV CODE 636 W HCPCS: Performed by: NURSE PRACTITIONER

## 2021-12-29 PROCEDURE — 88305 TISSUE EXAM BY PATHOLOGIST: ICD-10-PCS | Mod: 26,,, | Performed by: PATHOLOGY

## 2021-12-29 PROCEDURE — 99223 PR INITIAL HOSPITAL CARE,LEVL III: ICD-10-PCS | Mod: ,,, | Performed by: STUDENT IN AN ORGANIZED HEALTH CARE EDUCATION/TRAINING PROGRAM

## 2021-12-29 PROCEDURE — 63600175 PHARM REV CODE 636 W HCPCS: Performed by: NURSE ANESTHETIST, CERTIFIED REGISTERED

## 2021-12-29 PROCEDURE — 45381 COLONOSCOPY SUBMUCOUS NJX: CPT | Mod: 51,,, | Performed by: INTERNAL MEDICINE

## 2021-12-29 PROCEDURE — 45381 PR COLONOSCPY,FLEX,W/DIR SUBMUC INJECT: ICD-10-PCS | Mod: 51,,, | Performed by: INTERNAL MEDICINE

## 2021-12-29 PROCEDURE — 94761 N-INVAS EAR/PLS OXIMETRY MLT: CPT

## 2021-12-29 PROCEDURE — D9220A PRA ANESTHESIA: Mod: ANES,,, | Performed by: ANESTHESIOLOGY

## 2021-12-29 PROCEDURE — 80053 COMPREHEN METABOLIC PANEL: CPT | Performed by: NURSE PRACTITIONER

## 2021-12-29 PROCEDURE — 25000003 PHARM REV CODE 250: Performed by: INTERNAL MEDICINE

## 2021-12-29 PROCEDURE — 88341 PR IHC OR ICC EACH ADD'L SINGLE ANTIBODY  STAINPR: ICD-10-PCS | Mod: 26,,, | Performed by: PATHOLOGY

## 2021-12-29 PROCEDURE — 85025 COMPLETE CBC W/AUTO DIFF WBC: CPT | Performed by: NURSE PRACTITIONER

## 2021-12-29 PROCEDURE — 88341 IMHCHEM/IMCYTCHM EA ADD ANTB: CPT | Mod: 59 | Performed by: PATHOLOGY

## 2021-12-29 PROCEDURE — 27000221 HC OXYGEN, UP TO 24 HOURS

## 2021-12-29 PROCEDURE — 25000242 PHARM REV CODE 250 ALT 637 W/ HCPCS: Performed by: EMERGENCY MEDICINE

## 2021-12-29 PROCEDURE — 99900035 HC TECH TIME PER 15 MIN (STAT)

## 2021-12-29 PROCEDURE — 88342 IMHCHEM/IMCYTCHM 1ST ANTB: CPT | Performed by: PATHOLOGY

## 2021-12-29 PROCEDURE — 25000003 PHARM REV CODE 250: Performed by: NURSE PRACTITIONER

## 2021-12-29 PROCEDURE — 36415 COLL VENOUS BLD VENIPUNCTURE: CPT | Performed by: INTERNAL MEDICINE

## 2021-12-29 PROCEDURE — 94760 N-INVAS EAR/PLS OXIMETRY 1: CPT

## 2021-12-29 PROCEDURE — D9220A PRA ANESTHESIA: ICD-10-PCS | Mod: CRNA,,, | Performed by: NURSE ANESTHETIST, CERTIFIED REGISTERED

## 2021-12-29 PROCEDURE — 45380 PR COLONOSCOPY,BIOPSY: ICD-10-PCS | Mod: ,,, | Performed by: INTERNAL MEDICINE

## 2021-12-29 PROCEDURE — 45381 COLONOSCOPY SUBMUCOUS NJX: CPT | Performed by: INTERNAL MEDICINE

## 2021-12-29 PROCEDURE — 63600175 PHARM REV CODE 636 W HCPCS: Performed by: EMERGENCY MEDICINE

## 2021-12-29 PROCEDURE — 45380 COLONOSCOPY AND BIOPSY: CPT | Performed by: INTERNAL MEDICINE

## 2021-12-29 PROCEDURE — D9220A PRA ANESTHESIA: ICD-10-PCS | Mod: ANES,,, | Performed by: ANESTHESIOLOGY

## 2021-12-29 PROCEDURE — 36415 COLL VENOUS BLD VENIPUNCTURE: CPT | Performed by: NURSE PRACTITIONER

## 2021-12-29 PROCEDURE — A9698 NON-RAD CONTRAST MATERIALNOC: HCPCS

## 2021-12-29 PROCEDURE — 88305 TISSUE EXAM BY PATHOLOGIST: CPT | Mod: 26,,, | Performed by: PATHOLOGY

## 2021-12-29 PROCEDURE — 25000003 PHARM REV CODE 250: Performed by: EMERGENCY MEDICINE

## 2021-12-29 PROCEDURE — 97165 OT EVAL LOW COMPLEX 30 MIN: CPT

## 2021-12-29 PROCEDURE — 27201028 HC NEEDLE, SCLERO: Performed by: INTERNAL MEDICINE

## 2021-12-29 PROCEDURE — 27201012 HC FORCEPS, HOT/COLD, DISP: Performed by: INTERNAL MEDICINE

## 2021-12-29 PROCEDURE — 25000003 PHARM REV CODE 250: Performed by: NURSE ANESTHETIST, CERTIFIED REGISTERED

## 2021-12-29 PROCEDURE — C9113 INJ PANTOPRAZOLE SODIUM, VIA: HCPCS | Performed by: NURSE PRACTITIONER

## 2021-12-29 PROCEDURE — 99233 SBSQ HOSP IP/OBS HIGH 50: CPT | Mod: ,,, | Performed by: INTERNAL MEDICINE

## 2021-12-29 PROCEDURE — 25000003 PHARM REV CODE 250: Performed by: SPECIALIST

## 2021-12-29 PROCEDURE — 88342 IMHCHEM/IMCYTCHM 1ST ANTB: CPT | Mod: 26,,, | Performed by: PATHOLOGY

## 2021-12-29 PROCEDURE — 99233 PR SUBSEQUENT HOSPITAL CARE,LEVL III: ICD-10-PCS | Mod: ,,, | Performed by: INTERNAL MEDICINE

## 2021-12-29 PROCEDURE — D9220A PRA ANESTHESIA: Mod: CRNA,,, | Performed by: NURSE ANESTHETIST, CERTIFIED REGISTERED

## 2021-12-29 PROCEDURE — 99223 1ST HOSP IP/OBS HIGH 75: CPT | Mod: ,,, | Performed by: STUDENT IN AN ORGANIZED HEALTH CARE EDUCATION/TRAINING PROGRAM

## 2021-12-29 PROCEDURE — 45380 COLONOSCOPY AND BIOPSY: CPT | Mod: ,,, | Performed by: INTERNAL MEDICINE

## 2021-12-29 PROCEDURE — 84100 ASSAY OF PHOSPHORUS: CPT | Performed by: NURSE PRACTITIONER

## 2021-12-29 PROCEDURE — 88341 IMHCHEM/IMCYTCHM EA ADD ANTB: CPT | Mod: 26,,, | Performed by: PATHOLOGY

## 2021-12-29 PROCEDURE — 37000008 HC ANESTHESIA 1ST 15 MINUTES: Performed by: INTERNAL MEDICINE

## 2021-12-29 RX ORDER — POTASSIUM CHLORIDE 20 MEQ/1
20 TABLET, EXTENDED RELEASE ORAL ONCE
Status: COMPLETED | OUTPATIENT
Start: 2021-12-29 | End: 2021-12-29

## 2021-12-29 RX ORDER — POTASSIUM CHLORIDE 7.45 MG/ML
10 INJECTION INTRAVENOUS
Status: DISCONTINUED | OUTPATIENT
Start: 2021-12-29 | End: 2021-12-29

## 2021-12-29 RX ORDER — POTASSIUM CHLORIDE 20 MEQ/1
40 TABLET, EXTENDED RELEASE ORAL ONCE
Status: COMPLETED | OUTPATIENT
Start: 2021-12-29 | End: 2021-12-29

## 2021-12-29 RX ORDER — LIDOCAINE HCL/PF 100 MG/5ML
SYRINGE (ML) INTRAVENOUS
Status: DISCONTINUED | OUTPATIENT
Start: 2021-12-29 | End: 2021-12-29

## 2021-12-29 RX ORDER — PROPOFOL 10 MG/ML
VIAL (ML) INTRAVENOUS
Status: DISCONTINUED | OUTPATIENT
Start: 2021-12-29 | End: 2021-12-29

## 2021-12-29 RX ORDER — SODIUM,POTASSIUM PHOSPHATES 280-250MG
1 POWDER IN PACKET (EA) ORAL
Status: DISCONTINUED | OUTPATIENT
Start: 2021-12-29 | End: 2022-01-05 | Stop reason: HOSPADM

## 2021-12-29 RX ADMIN — POTASSIUM & SODIUM PHOSPHATES POWDER PACK 280-160-250 MG 1 PACKET: 280-160-250 PACK at 09:12

## 2021-12-29 RX ADMIN — MIRTAZAPINE 15 MG: 15 TABLET, FILM COATED ORAL at 09:12

## 2021-12-29 RX ADMIN — PROPOFOL 20 MG: 10 INJECTION, EMULSION INTRAVENOUS at 04:12

## 2021-12-29 RX ADMIN — MUPIROCIN: 20 OINTMENT TOPICAL at 09:12

## 2021-12-29 RX ADMIN — MUPIROCIN: 20 OINTMENT TOPICAL at 10:12

## 2021-12-29 RX ADMIN — IPRATROPIUM BROMIDE AND ALBUTEROL SULFATE 3 ML: 2.5; .5 SOLUTION RESPIRATORY (INHALATION) at 12:12

## 2021-12-29 RX ADMIN — MIDODRINE HYDROCHLORIDE 5 MG: 2.5 TABLET ORAL at 04:12

## 2021-12-29 RX ADMIN — LORAZEPAM 0.5 MG: 2 INJECTION INTRAMUSCULAR; INTRAVENOUS at 09:12

## 2021-12-29 RX ADMIN — POTASSIUM CHLORIDE 20 MEQ: 1500 TABLET, EXTENDED RELEASE ORAL at 01:12

## 2021-12-29 RX ADMIN — POTASSIUM & SODIUM PHOSPHATES POWDER PACK 280-160-250 MG 1 PACKET: 280-160-250 PACK at 01:12

## 2021-12-29 RX ADMIN — Medication 1000 UNITS: at 10:12

## 2021-12-29 RX ADMIN — IPRATROPIUM BROMIDE AND ALBUTEROL SULFATE 3 ML: 2.5; .5 SOLUTION RESPIRATORY (INHALATION) at 01:12

## 2021-12-29 RX ADMIN — POTASSIUM CHLORIDE 10 MEQ: 7.46 INJECTION, SOLUTION INTRAVENOUS at 08:12

## 2021-12-29 RX ADMIN — FLUTICASONE FUROATE AND VILANTEROL TRIFENATATE 1 PUFF: 100; 25 POWDER RESPIRATORY (INHALATION) at 07:12

## 2021-12-29 RX ADMIN — THERA TABS 1 TABLET: TAB at 10:12

## 2021-12-29 RX ADMIN — POTASSIUM & SODIUM PHOSPHATES POWDER PACK 280-160-250 MG 1 PACKET: 280-160-250 PACK at 04:12

## 2021-12-29 RX ADMIN — PROPOFOL 30 MG: 10 INJECTION, EMULSION INTRAVENOUS at 03:12

## 2021-12-29 RX ADMIN — MIDODRINE HYDROCHLORIDE 5 MG: 2.5 TABLET ORAL at 09:12

## 2021-12-29 RX ADMIN — LIDOCAINE HYDROCHLORIDE 75 MG: 20 INJECTION INTRAVENOUS at 03:12

## 2021-12-29 RX ADMIN — LIDOCAINE 5% 1 PATCH: 700 PATCH TOPICAL at 04:12

## 2021-12-29 RX ADMIN — POTASSIUM CHLORIDE 10 MEQ: 750 TABLET, EXTENDED RELEASE ORAL at 10:12

## 2021-12-29 RX ADMIN — SODIUM CHLORIDE, SODIUM LACTATE, POTASSIUM CHLORIDE, AND CALCIUM CHLORIDE 500 ML: .6; .31; .03; .02 INJECTION, SOLUTION INTRAVENOUS at 11:12

## 2021-12-29 RX ADMIN — PIPERACILLIN AND TAZOBACTAM 4.5 G: 4; .5 INJECTION, POWDER, LYOPHILIZED, FOR SOLUTION INTRAVENOUS; PARENTERAL at 04:12

## 2021-12-29 RX ADMIN — IPRATROPIUM BROMIDE AND ALBUTEROL SULFATE 3 ML: 2.5; .5 SOLUTION RESPIRATORY (INHALATION) at 06:12

## 2021-12-29 RX ADMIN — PANTOPRAZOLE SODIUM 40 MG: 40 INJECTION, POWDER, LYOPHILIZED, FOR SOLUTION INTRAVENOUS at 10:12

## 2021-12-29 RX ADMIN — MIDODRINE HYDROCHLORIDE 5 MG: 2.5 TABLET ORAL at 10:12

## 2021-12-29 RX ADMIN — PIPERACILLIN AND TAZOBACTAM 4.5 G: 4; .5 INJECTION, POWDER, LYOPHILIZED, FOR SOLUTION INTRAVENOUS; PARENTERAL at 11:12

## 2021-12-29 RX ADMIN — IPRATROPIUM BROMIDE AND ALBUTEROL SULFATE 3 ML: 2.5; .5 SOLUTION RESPIRATORY (INHALATION) at 08:12

## 2021-12-29 RX ADMIN — LORAZEPAM 0.5 MG: 2 INJECTION INTRAMUSCULAR; INTRAVENOUS at 02:12

## 2021-12-29 RX ADMIN — PROPOFOL 80 MG: 10 INJECTION, EMULSION INTRAVENOUS at 03:12

## 2021-12-29 RX ADMIN — ONDANSETRON 8 MG: 2 INJECTION INTRAMUSCULAR; INTRAVENOUS at 11:12

## 2021-12-29 RX ADMIN — DILTIAZEM HYDROCHLORIDE 10 MG/HR: 5 INJECTION INTRAVENOUS at 05:12

## 2021-12-29 RX ADMIN — IOHEXOL 75 ML: 350 INJECTION, SOLUTION INTRAVENOUS at 08:12

## 2021-12-29 RX ADMIN — CYANOCOBALAMIN TAB 1000 MCG 1000 MCG: 1000 TAB at 10:12

## 2021-12-29 RX ADMIN — POTASSIUM CHLORIDE 10 MEQ: 7.46 INJECTION, SOLUTION INTRAVENOUS at 05:12

## 2021-12-29 RX ADMIN — POLYETHYLENE GLYCOL 3350 17 G: 17 POWDER, FOR SOLUTION ORAL at 10:12

## 2021-12-29 RX ADMIN — ONDANSETRON 8 MG: 2 INJECTION INTRAMUSCULAR; INTRAVENOUS at 04:12

## 2021-12-29 RX ADMIN — DIGOXIN 0.12 MG: 125 TABLET ORAL at 10:12

## 2021-12-29 RX ADMIN — POTASSIUM CHLORIDE 40 MEQ: 1500 TABLET, EXTENDED RELEASE ORAL at 10:12

## 2021-12-29 RX ADMIN — Medication 1 EACH: at 10:12

## 2021-12-29 RX ADMIN — IOHEXOL 1000 ML: 9 SOLUTION ORAL at 08:12

## 2021-12-29 NOTE — RESPIRATORY THERAPY
Patient receiving aerosol tx via duoneb now and q6hr follow by Karley bailey x 1 puff qday.  Hr 98 and 02 saturation 100% on nc at 2lpm.

## 2021-12-29 NOTE — PLAN OF CARE
Goals to be met by: 1/26/22    Patient will increase functional independence with ADLs by performing:    UE Dressing with Supervision.  LE Dressing with Stand by assistance  Grooming while seated with Supervision  Toileting from toilet with Stand by assistance for hygiene and clothing management.   Bathing from  shower chair/bench with Minimal Assistance.  Toilet transfer to toilet with Stand-by Assistance.  Increased strength and functional activity tolerance for performance of ADL's as evidenced by requiring less assistance with these tasks

## 2021-12-29 NOTE — SUBJECTIVE & OBJECTIVE
Interval History: GI to do colonoscopy; Lasix held for JOSE and 500 cc LR bolus ordered; Pulmonary and Cardiology following; PT/OT consulted; transferring out of ICU.    Review of Systems   Constitutional: Negative for chills and fever.   HENT: Negative for congestion and sore throat.    Eyes: Negative for visual disturbance.   Respiratory: Positive for shortness of breath and wheezing. Negative for cough.    Cardiovascular: Negative for chest pain and palpitations.   Gastrointestinal: Positive for blood in stool. Negative for abdominal pain, nausea and vomiting.   Endocrine: Negative for cold intolerance and heat intolerance.   Genitourinary: Negative for dysuria and hematuria.   Musculoskeletal: Negative for arthralgias and myalgias.   Skin: Negative for pallor and rash.   Neurological: Negative for tremors and seizures.   Hematological: Negative for adenopathy. Does not bruise/bleed easily.   Psychiatric/Behavioral: Negative for hallucinations.   All other systems reviewed and are negative.    Objective:     Vital Signs (Most Recent):  Temp: 98.2 °F (36.8 °C) (12/29/21 0400)  Pulse: 91 (12/29/21 0705)  Resp: 20 (12/29/21 0705)  BP: (!) 118/58 (12/29/21 0700)  SpO2: 100 % (12/29/21 0705) Vital Signs (24h Range):  Temp:  [97.8 °F (36.6 °C)-99.68 °F (37.6 °C)] 98.2 °F (36.8 °C)  Pulse:  [] 91  Resp:  [18-54] 20  SpO2:  [90 %-100 %] 100 %  BP: ()/(39-82) 118/58     Weight: 48.1 kg (106 lb 0.7 oz)  Body mass index is 18.78 kg/m².    Intake/Output Summary (Last 24 hours) at 12/29/2021 0929  Last data filed at 12/29/2021 0816  Gross per 24 hour   Intake 962.68 ml   Output 2600 ml   Net -1637.32 ml      Physical Exam  Vitals and nursing note reviewed.   Constitutional:       General: She is awake. She is not in acute distress.     Appearance: She is well-developed and underweight. She is ill-appearing.      Interventions: Face mask in place.   HENT:      Head: Normocephalic and atraumatic.      Right Ear:  External ear normal.      Left Ear: External ear normal.      Nose: Nose normal.      Mouth/Throat:      Mouth: Mucous membranes are moist.      Pharynx: Oropharynx is clear.   Eyes:      Extraocular Movements: Extraocular movements intact.      Conjunctiva/sclera: Conjunctivae normal.   Neck:      Thyroid: No thyromegaly.      Vascular: No JVD.   Cardiovascular:      Rate and Rhythm: Normal rate and regular rhythm.      Pulses: Normal pulses.      Heart sounds: Normal heart sounds, S1 normal and S2 normal. Heart sounds not distant. No murmur heard.  No friction rub. No gallop.    Pulmonary:      Effort: Tachypnea present. No respiratory distress.      Breath sounds: Decreased air movement present. Examination of the right-lower field reveals decreased breath sounds. Examination of the left-lower field reveals decreased breath sounds. Decreased breath sounds present. No wheezing, rhonchi or rales.      Comments: 2 L NC.  Abdominal:      General: Abdomen is flat. Bowel sounds are normal. There is no distension.      Palpations: Abdomen is soft. There is no mass.      Tenderness: There is no abdominal tenderness.   Genitourinary:     Comments: Stout.  Musculoskeletal:         General: Normal range of motion.      Cervical back: Full passive range of motion without pain, normal range of motion and neck supple.      Right lower leg: No edema.      Left lower leg: No edema.   Skin:     General: Skin is warm and dry.      Capillary Refill: Capillary refill takes less than 2 seconds.   Neurological:      General: No focal deficit present.      Mental Status: She is alert and oriented to person, place, and time. Mental status is at baseline.      Cranial Nerves: No cranial nerve deficit.      Motor: Motor function is intact.      Coordination: Coordination is intact.   Psychiatric:         Mood and Affect: Mood normal.         Behavior: Behavior normal. Behavior is cooperative.         Significant Labs: All pertinent labs  within the past 24 hours have been reviewed.    Significant Imaging: I have reviewed all pertinent imaging results/findings within the past 24 hours.

## 2021-12-29 NOTE — PROGRESS NOTES
[facility] Hospital Medicine  Progress Note    Patient Name: Patrizia Valadez  MRN: [id number]  Patient Class: IP- Inpatient   Admission Date: 12/26/2021  Length of Stay: 3 days  Attending Physician: Alex Cunningham MD  Primary Care Provider: Anya Farr NP        Subjective:     Principal Problem:<principal problem not specified>        HPI:  Patrizia Valadez is a 75-year-old female with a past medical history of COPD, chronic respiratory failure on oxygen at home, lymphoma, and past surgical history of cholecystectomy who presented to the ED with a complaint of shortness of breath. HPI is limited due to acuity of condition. She was sent from a nursing some for decreased mental status as well as low oxygen saturation and respiratory distress. Per ED notes, EMS found the patient to have a pulse ox of 79% on arrival. She received a duoneb treatment, albuterol treatment and solumedrol 125 mg prior to arrival.     Upon arrival to the ED, the patient was found to be altered and confused, and in respiratory distress. She was placed on a non re breather mask, which improved her oxygenation to 98% but she did require non invasive ventilation. She was placed on BiPAP at 50%, 14/6, which has improved her respiratory status. She is more alert and awake, and is able to answer simple questions appropriately. Chest xray revealed bilateral pneumonia along with chronic findings. CBC revealed leukocytosis of 12.87 and what appears to be chronic anemia. Initial lactic acid level was elevated at 2.4, but has decreased to 1.5. Troponin negative, urine negative. BNP elevated at 1540. She was given one dose of Lasix 40 mg IV. Due to continued use of BiPAP and high acuity, she will be admitted to the ICU. The patient does state she is a DNR, review of medical record does show past recent admissions as a DNR. She does not have paper work with her today. Hospital Medicine consulted for admission and further  management.          Overview/Hospital Course:  Patrizia Valadez is a 75 year old female with a past medical history of COPD, chronic respiratory failure on oxygen at home, lymphoma, HANNAH, Afib, and CHF who presented to the ED with a complaint of shortness of breath. She was found to have an acute on chronic CHF exacerbation and is being treated with IV Lasix. She has required BiPAP therapy and was admitted to the ICU. She was placed empiric Zosyn as well for a possible superimposed pneumonia (WBC and procalcitonin elevated.) Pulmonary and Cardiology have also been consulted. Her course was complicated by Afib for which the patient is on a diltiazem infusion. Anticoagulation with Eliquis was held given a witnessed bloody bowel movement. GI has been consulted and will perform colonoscopy 12/29. She was diuresed roughly 2.2 L and she was able to be weaned off BiPAP to 2 L NC. Her course was complicated by JOSE likely secondary to over-diuresis. Lasix has held and a 500 cc LR bolus was ordered. PT/OT was consulted and the patient was transferred out of the ICU 12/29.      Interval History: GI to do colonoscopy; Lasix held for JOSE and 500 cc LR bolus ordered; Pulmonary and Cardiology following; PT/OT consulted; transferring out of ICU.    Review of Systems   Constitutional: Negative for chills and fever.   HENT: Negative for congestion and sore throat.    Eyes: Negative for visual disturbance.   Respiratory: Positive for shortness of breath and wheezing. Negative for cough.    Cardiovascular: Negative for chest pain and palpitations.   Gastrointestinal: Positive for blood in stool. Negative for abdominal pain, nausea and vomiting.   Endocrine: Negative for cold intolerance and heat intolerance.   Genitourinary: Negative for dysuria and hematuria.   Musculoskeletal: Negative for arthralgias and myalgias.   Skin: Negative for pallor and rash.   Neurological: Negative for tremors and seizures.   Hematological: Negative for  adenopathy. Does not bruise/bleed easily.   Psychiatric/Behavioral: Negative for hallucinations.   All other systems reviewed and are negative.    Objective:     Vital Signs (Most Recent):  Temp: 98.2 °F (36.8 °C) (12/29/21 0400)  Pulse: 91 (12/29/21 0705)  Resp: 20 (12/29/21 0705)  BP: (!) 118/58 (12/29/21 0700)  SpO2: 100 % (12/29/21 0705) Vital Signs (24h Range):  Temp:  [97.8 °F (36.6 °C)-99.68 °F (37.6 °C)] 98.2 °F (36.8 °C)  Pulse:  [] 91  Resp:  [18-54] 20  SpO2:  [90 %-100 %] 100 %  BP: ()/(39-82) 118/58     Weight: 48.1 kg (106 lb 0.7 oz)  Body mass index is 18.78 kg/m².    Intake/Output Summary (Last 24 hours) at 12/29/2021 0929  Last data filed at 12/29/2021 0816  Gross per 24 hour   Intake 962.68 ml   Output 2600 ml   Net -1637.32 ml      Physical Exam  Vitals and nursing note reviewed.   Constitutional:       General: She is awake. She is not in acute distress.     Appearance: She is well-developed and underweight. She is ill-appearing.      Interventions: Face mask in place.   HENT:      Head: Normocephalic and atraumatic.      Right Ear: External ear normal.      Left Ear: External ear normal.      Nose: Nose normal.      Mouth/Throat:      Mouth: Mucous membranes are moist.      Pharynx: Oropharynx is clear.   Eyes:      Extraocular Movements: Extraocular movements intact.      Conjunctiva/sclera: Conjunctivae normal.   Neck:      Thyroid: No thyromegaly.      Vascular: No JVD.   Cardiovascular:      Rate and Rhythm: Normal rate and regular rhythm.      Pulses: Normal pulses.      Heart sounds: Normal heart sounds, S1 normal and S2 normal. Heart sounds not distant. No murmur heard.  No friction rub. No gallop.    Pulmonary:      Effort: Tachypnea present. No respiratory distress.      Breath sounds: Decreased air movement present. Examination of the right-lower field reveals decreased breath sounds. Examination of the left-lower field reveals decreased breath sounds. Decreased breath  sounds present. No wheezing, rhonchi or rales.      Comments: 2 L NC.  Abdominal:      General: Abdomen is flat. Bowel sounds are normal. There is no distension.      Palpations: Abdomen is soft. There is no mass.      Tenderness: There is no abdominal tenderness.   Genitourinary:     Comments: Girish.  Musculoskeletal:         General: Normal range of motion.      Cervical back: Full passive range of motion without pain, normal range of motion and neck supple.      Right lower leg: No edema.      Left lower leg: No edema.   Skin:     General: Skin is warm and dry.      Capillary Refill: Capillary refill takes less than 2 seconds.   Neurological:      General: No focal deficit present.      Mental Status: She is alert and oriented to person, place, and time. Mental status is at baseline.      Cranial Nerves: No cranial nerve deficit.      Motor: Motor function is intact.      Coordination: Coordination is intact.   Psychiatric:         Mood and Affect: Mood normal.         Behavior: Behavior normal. Behavior is cooperative.         Significant Labs: All pertinent labs within the past 24 hours have been reviewed.    Significant Imaging: I have reviewed all pertinent imaging results/findings within the past 24 hours.      Assessment/Plan:      JOSE (acute kidney injury)  Likely pre-renal given over-duresis.  -LR bolus plus infusion  -Hold diuresis  -Renally dose medications  -Avoid nephrotoxic agents  -Monitor UOP  -Trend creatinine and electrolytes    Rectal bleeding  -GI consulted; colonoscopy 12/29  -Trend CBC  -Hold anticoagulation      Chronic hypotension on midodrine  -Continue midodrine      Chronic anemia  -Monitor CBC in setting of bloody bowel movement  -Hold anticoagulation    Acute on chronic heart failure with preserved ejection fraction  -IV diuresis held given JOSE  -Telemetry  -Cardiology consulted  -Strict I's and O's  -K > 4 and Mg > 2    Paroxysmal atrial fibrillation  -Telemetry  -Diltiazem  infusion  -Cardiology consulted  -Hold Eliquis in getting of bloody bowel movement      Multifocal atrial tachycardia        Acute on chronic respiratory failure with hypoxia and hypercapnia  In setting of acute CHF exacerbation.  -BiPAP PRN  -NC O2  -CXR and ABG PRN  -Pulmonary consulted  -Zosyn for possible pneumonia  -Hold diuresis given JOSE    COPD (chronic obstructive pulmonary disease)  -Continue home inhalers  -NC O2          VTE Risk Mitigation (From admission, onward)         Ordered     Place sequential compression device  Until discontinued         12/27/21 0302                Discharge Planning   VAZQUEZ: 12/31/2021    Code Status: DNR   Is the patient medically ready for discharge?:     Reason for patient still in hospital (select all that apply): Patient new problem, Patient trending condition, Laboratory test, Treatment, Imaging, Consult recommendations and PT / OT recommendations  Discharge Plan A: Return to nursing home            Critical care time spent on the evaluation and treatment of severe organ dysfunction, review of pertinent labs and imaging studies, discussions with consulting providers and discussions with patient/family: 34 minutes.      Alex Cunningham MD  Department of Hospital Medicine   [facility]

## 2021-12-29 NOTE — PLAN OF CARE
POC reviewed with the patient and she verbalized understanding. All comments and concerns addressed. Patient on 4L NC most of shift, tolerating well. Tolerating PO intake at this time. Patient scheduled for bowel prep this evening and colonoscopy tomorrow AM. Diltiazem continued for HR control. A. Fib throughout shift. BM x1--hard stool with minimal bright red blood noted. Stout remains intact--1200 cc output this shift. Bed locked in lowest position with bed alarm set, call light within reach. Safety precautions maintained.

## 2021-12-29 NOTE — CARE UPDATE
12/28/21 1910   Patient Assessment/Suction   Level of Consciousness (AVPU) alert   Respiratory Effort Unlabored;Normal   Expansion/Accessory Muscles/Retractions no use of accessory muscles;no retractions;expansion symmetric   All Lung Fields Breath Sounds Anterior:;Lateral:;diminished   Rhythm/Pattern, Respiratory unlabored;pattern regular;depth regular   Cough Frequency infrequent   Cough Type dry   PRE-TX-O2   O2 Device (Oxygen Therapy) nasal cannula   $ Is the patient on Low Flow Oxygen? Yes   Flow (L/min) (S)  4  (DECREASED TO 3 LPM)   SpO2 96 %   Pulse Oximetry Type Continuous   $ Pulse Oximetry - Multiple Charge Pulse Oximetry - Multiple   Pulse 90   Resp 20   Aerosol Therapy   $ Aerosol Therapy Charges Aerosol Treatment   Respiratory Treatment Status (SVN) given   Treatment Route (SVN) mask   Patient Position (SVN) HOB elevated   Post Treatment Assessment (SVN) breath sounds unchanged   Signs of Intolerance (SVN) none   Breath Sounds Post-Respiratory Treatment   Throughout All Fields Post-Treatment All Fields   Throughout All Fields Post-Treatment no change   Post-treatment Heart Rate (beats/min) 99   Post-treatment Resp Rate (breaths/min) 20   Skin Integrity   $ Wound Care Tech Time 15 min   Area Observed Bridge of nose   Skin Appearance without discoloration   Barrier used? Liquid Filled Cushion   Preset CPAP/BiPAP Settings   Mode Of Delivery BiPAP;Standby

## 2021-12-29 NOTE — PT/OT/SLP EVAL
Occupational Therapy   Evaluation    Name: Patrizia Valadez  MRN: [id number]  Admitting Diagnosis:  <principal problem not specified>  Recent Surgery: Procedure(s) (LRB):  COLONOSCOPY (N/A) Day of Surgery    Recommendations:     Discharge Recommendations: nursing facility, basic,nursing facility, skilled  Discharge Equipment Recommendations:  other (see comments) (TBD)  Barriers to discharge:       Assessment:     Patrizia Valadez is a 75 y.o. female with a medical diagnosis of <principal problem not specified>.  She presents with the following performance deficits affecting function: weakness,impaired endurance,impaired self care skills,impaired functional mobilty,impaired cardiopulmonary response to activity.  Pt was agreeable to OT for in bed activities as pt is waiting for EGD. Pt demonstrates BUE AROM/strength WFL's. Pt R hand dominant. OT provided instruction in safety with ADL's including review of prior level of function with regard to ADL's and AE/DME. Pt verbalized understanding. Pt washed face with set up and completed oral hygiene with swab with Min assist. Pt observed NPO during oral hygiene. Recommend Basic nursing facility/SNF at discharge.     Rehab Prognosis: Good; patient would benefit from acute skilled OT services to address these deficits and reach maximum level of function.       Plan:     Patient to be seen 4 x/week to address the above listed problems via self-care/home management,therapeutic activities,therapeutic exercises  · Plan of Care Expires: 01/26/22  · Plan of Care Reviewed with: patient    Subjective     Chief Complaint: No complaints  Patient/Family Comments/goals: To get better    Occupational Profile:  Living Environment: Pt is a resident of [facility]  Previous level of function: Pt reports walking with therapy, using wc to propel self secondary to unsteady in standing, assist with ADL's in standing.  Equipment Used at Home:  raised toilet,hospital bed,wheelchair  Assistance upon  Discharge: [facility] Staff    Pain/Comfort:  · Pain Rating 1: 0/10  · Pain Rating Post-Intervention 1: 0/10    Patients cultural, spiritual, Caodaism conflicts given the current situation:      Objective:     Communicated with: Nurse Watters prior to session.  Patient found HOB elevated with bed alarm,lee catheter,peripheral IV,pulse ox (continuous),telemetry upon OT entry to room.    General Precautions: Standard, fall,NPO   Orthopedic Precautions:N/A   Braces: N/A  Respiratory Status: Nasal cannula, flow 2 L/min    Occupational Performance:    Activities of Daily Living:  · Feeding:  NPO    · Grooming: minimum assistance    · Bathing: maximal assistance    · Upper Body Dressing: moderate assistance    · Lower Body Dressing: maximal assistance    · Toileting: Lee catheter      Cognitive/Visual Perceptual:  Pt alert and followed instructions for Eval    Physical Exam:  Upper Extremity Strength:    -       Right Upper Extremity: WFL  -       Left Upper Extremity: WFL    AMPAC 6 Click ADL:  AMPAC Total Score: 12    Treatment & Education:  OT provided education in role of OT. Pt verbalized understanding and was agreeable to OT.  OT provided instruction in safety with ADL's including review of DME/AE and prior level of function. Pt verbalized understanding.  Education:    Patient left HOB elevated with all lines intact, call button in reach, bed alarm on and Nurse Watters notified    GOALS:   Multidisciplinary Problems     Occupational Therapy Goals        Problem: Occupational Therapy Goal    Goal Priority Disciplines Outcome Interventions   Occupational Therapy Goal     OT, PT/OT     Description: Goals to be met by: 1/26/22    Patient will increase functional independence with ADLs by performing:    UE Dressing with Supervision.  LE Dressing with Stand by assistance  Grooming while seated with Supervision  Toileting from toilet with Stand by assistance for hygiene and clothing management.   Bathing from  shower  chair/bench with Minimal Assistance.  Toilet transfer to toilet with Stand-by Assistance.  Increased strength and functional activity tolerance for performance of ADL's as evidenced by requiring less assistance with these tasks                     History:     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    Respiratory failure, chronic        Past Surgical History:   Procedure Laterality Date    CHOLECYSTECTOMY      ESOPHAGOGASTRODUODENOSCOPY N/A 10/23/2021    Procedure: EGD (ESOPHAGOGASTRODUODENOSCOPY);  Surgeon: Jaja Hoover MD;  Location: [facility];  Service: Endoscopy;  Laterality: N/A;    HYSTERECTOMY      NO PAST SURGERIES         Time Tracking:     OT Date of Treatment: 12/29/21  OT Start Time: 1448  OT Stop Time: 1458  OT Total Time (min): 10 min    Billable Minutes:Evaluation 10    12/29/2021

## 2021-12-29 NOTE — ASSESSMENT & PLAN NOTE
Likely pre-renal given over-duresis.  -LR bolus plus infusion  -Hold diuresis  -Renally dose medications  -Avoid nephrotoxic agents  -Monitor UOP  -Trend creatinine and electrolytes

## 2021-12-29 NOTE — PROVATION PATIENT INSTRUCTIONS
Discharge Summary/Instructions after an Endoscopic Procedure  Patient Name: Patrizia Valadez  Patient MRN: [id number]  Patient YOB: 1945 Wednesday, December 29, 2021  Ghassan Ambrocio MD  Dear patient,  As a result of recent federal legislation (The Federal Cures Act), you may   receive lab or pathology results from your procedure in your [facility]   account before your physician is able to contact you. Your physician or   their representative will relay the results to you with their   recommendations at their soonest availability.  Thank you,  RESTRICTIONS:  During your procedure today, you received medications for sedation.  These   medications may affect your judgment, balance and coordination.  Therefore,   for 24 hours, you have the following restrictions:   - DO NOT drive a car, operate machinery, make legal/financial decisions,   sign important papers or drink alcohol.    ACTIVITY:  Today: no heavy lifting, straining or running due to procedural   sedation/anesthesia.  The following day: return to full activity including work.  DIET:  Eat and drink normally unless instructed otherwise.     TREATMENT FOR COMMON SIDE EFFECTS:  - Mild abdominal pain, nausea, belching, bloating or excessive gas:  rest,   eat lightly and use a heating pad.  - Sore Throat: treat with throat lozenges and/or gargle with warm salt   water.  - Because air was used during the procedure, expelling large amounts of air   from your rectum or belching is normal.  - If a bowel prep was taken, you may not have a bowel movement for 1-3 days.    This is normal.  SYMPTOMS TO WATCH FOR AND REPORT TO YOUR PHYSICIAN:  1. Abdominal pain or bloating, other than gas cramps.  2. Chest pain.  3. Back pain.  4. Signs of infection such as: chills or fever occurring within 24 hours   after the procedure.  5. Rectal bleeding, which would show as bright red, maroon, or black stools.   (A tablespoon of blood from the rectum is not serious, especially  if   hemorrhoids are present.)  6. Vomiting.  7. Weakness or dizziness.  GO DIRECTLY TO THE NEAREST EMERGENCY ROOM IF YOU HAVE ANY OF THE FOLLOWING:      Difficulty breathing              Chills and/or fever over 101 F   Persistent vomiting and/or vomiting blood   Severe abdominal pain   Severe chest pain   Black, tarry stools   Bleeding- more than one tablespoon   Any other symptom or condition that you feel may need urgent attention  Your doctor recommends these additional instructions:  If any biopsies were taken, your doctors clinic will contact you in 1 to 2   weeks with any results.  - Return patient to ICU for ongoing care.   - Resume previous diet.   - Continue present medications.   - Await pathology results.   - Repeat colonoscopy (date not yet determined) for surveillance.   - Perform a CT scan (computed tomography) of chest with contrast, abdomen   with contrast and pelvis with contrast at appointment to be scheduled.   - Check CEA today.   - Refer to a surgeon at appointment to be scheduled.   - Return to my office after studies are complete.  For questions, problems or results please call your physician - Ghassan Ambrocio MD at Work:  [phone].  [facility] [place], EMERGENCY ROOM PHONE NUMBER: [phone]  IF A COMPLICATION OR EMERGENCY SITUATION ARISES AND YOU ARE UNABLE TO REACH   YOUR PHYSICIAN - GO DIRECTLY TO THE EMERGENCY ROOM.  Ghassan Ambrocio MD  12/29/2021 5:05:24 PM  This report has been verified and signed electronically.  Dear patient,  As a result of recent federal legislation (The Federal Cures Act), you may   receive lab or pathology results from your procedure in your [facility]   account before your physician is able to contact you. Your physician or   their representative will relay the results to you with their   recommendations at their soonest availability.  Thank you,  [employer]OVATION

## 2021-12-29 NOTE — PLAN OF CARE
Pt awake, alert.  Vital signs stable, denies nausea, states pain is 0, abd soft. No adverse effects of anesthesia noted. Left in stable condition with nurse Watters at bedside.

## 2021-12-29 NOTE — PROGRESS NOTES
Progress Note  PULMONARY    Admit Date: 12/26/2021 12/29/2021      SUBJECTIVE:     12/28- continues on bipap, tele with atrial fib. Hgb dropping to 7. Diuresed well with -2L yesterday. Feels better  12/29- resp status improved, on 2L NC. Diuresing well. Potassium low at 2.8 today      OBJECTIVE:     Vitals (Most recent):  Vitals:    12/29/21 0705   BP:    Pulse: 91   Resp: 20   Temp:        Vitals (24 hour range):  Temp:  [97.8 °F (36.6 °C)-99.68 °F (37.6 °C)]   Pulse:  []   Resp:  [18-54]   BP: ()/(39-82)   SpO2:  [90 %-100 %]       Intake/Output Summary (Last 24 hours) at 12/29/2021 0857  Last data filed at 12/29/2021 0816  Gross per 24 hour   Intake 962.68 ml   Output 2600 ml   Net -1637.32 ml          Physical Exam:  The patient's neuro status (alertness,orientation,cognitive function,motor skills,), pharyngeal exam (oral lesions, hygiene, abn dentition,), Neck (jvd,mass,thyroid,nodes in neck and above/below clavicle),RESPIRATORY(symmetry,effort,fremitus,percussion,auscultation),  Cor(rhythm,heart tones including gallops,perfusion,edema)ABD(distention,hepatic&splenomegaly,tenderness,masses), Skin(rash,cyanosis),Psyc(affect,judgement,).  Exam negative except for these pertinent findings:    Awake, alert, no distress  Bilateral diminished breath sounds   HR irregularly irreg, tachycardic  Abdomen soft, nontender  No edema        Radiographs reviewed: view by direct vision   CXR 12/29- improved pulm edema, continued small pleural effusions bilaterally  CXR 12/26- bilat effusions, bilat basilar airspace disease with pulmonary vascular congestion, new compared to prior film 11/2021.  CT chest 8/3/21- emphysematous changes, hyperinflation, mild ggos in the bases     Labs     Recent Labs   Lab 12/29/21  0333   WBC 10.23   HGB 7.1*   HCT 24.1*        Recent Labs   Lab 12/29/21  0333      K 2.8*   CL 92*   CO2 36*   BUN 21   CREATININE 1.6*   GLU 99   CALCIUM 8.6*   MG 2.0   PHOS 2.5*   AST 13    ALT 9*   ALKPHOS 56   BILITOT 0.5   PROT 5.9*   ALBUMIN 2.7*   No results for input(s): PH, PCO2, PO2, HCO3 in the last 24 hours.  Microbiology Results (last 7 days)     Procedure Component Value Units Date/Time    Blood culture x two cultures. Draw prior to antibiotics. [[id number]] Collected: 12/26/21 1902    Order Status: Completed Specimen: Blood from Peripheral, Right Updated: 12/29/21 0612     Blood Culture, Routine No Growth to date      No Growth to date      No Growth to date    Narrative:      Aerobic and anaerobic    Blood culture x two cultures. Draw prior to antibiotics. [[id number]] Collected: 12/26/21 1910    Order Status: Completed Specimen: Blood Updated: 12/29/21 0612     Blood Culture, Routine No Growth to date      No Growth to date      No Growth to date    Narrative:      Aerobic and anaerobic          Impression:  Active Hospital Problems    Diagnosis  POA    Rectal bleeding [K62.5]  No    Chronic anemia [D64.9]  Yes    Chronic hypotension on midodrine [I95.9]  Yes     Chronic    Acute on chronic heart failure with preserved ejection fraction [I50.33]  Yes    Paroxysmal atrial fibrillation [I48.0]  Yes    Acute on chronic respiratory failure with hypoxia and hypercapnia [J96.21, J96.22]  Yes    COPD (chronic obstructive pulmonary disease) [J44.9]  Yes     Chronic      Resolved Hospital Problems   No resolved problems to display.               Plan:     - continue supplemental O2 to keep sats 89-92%  - hold lasix until potassium is improved  - extra KCl 40meq x1 ordered  - continue breo inhaler  - continue nebulized bronchodilators  - continue zosyn in abundance of caution  - cardiology following  - GI following with plan to scope  - monitor Hgb; transfuse as needed to maintain >7  - eliquis held given drop in Hgb        Miranda Cash MD  Pulmonary & Critical Care Medicine               19-Nov-2019 15:45

## 2021-12-29 NOTE — PROGRESS NOTES
[facility]  Cardiology  Progress Note    Patient Name: Patrizia Valadez  MRN: [id number]  Admission Date: 12/26/2021  Hospital Length of Stay: 3 days  Code Status: DNR   Attending Physician: Alex Cunningham MD   Primary Care Physician: Anya Farr NP  Expected Discharge Date:   Principal Problem:<principal problem not specified>    Subjective:     Hospital Course:  The patient remains stable.  Anticoagulation discontinued and the blood count has remained stable.  Telemetry today shows multifocal atrial tachycardia.        ROS  Objective:     Vital Signs (Most Recent):  Temp: 98.2 °F (36.8 °C) (12/29/21 0400)  Pulse: (!) 124 (12/29/21 1353)  Resp: 18 (12/29/21 1353)  BP: (!) 118/56 (12/29/21 1353)  SpO2: 97 % (12/29/21 1353) Vital Signs (24h Range):  Temp:  [97.8 °F (36.6 °C)-99.68 °F (37.6 °C)] 98.2 °F (36.8 °C)  Pulse:  [] 124  Resp:  [18-69] 18  SpO2:  [92 %-100 %] 97 %  BP: ()/(39-82) 118/56     Weight: 48.1 kg (106 lb 0.7 oz)  Body mass index is 18.78 kg/m².    SpO2: 97 %  O2 Device (Oxygen Therapy): nasal cannula      Intake/Output Summary (Last 24 hours) at 12/29/2021 1356  Last data filed at 12/29/2021 0816  Gross per 24 hour   Intake 636.87 ml   Output 2150 ml   Net -1513.13 ml       Lines/Drains/Airways     Drain                 Urethral Catheter 12/26/21 2050 16 Fr. 2 days          Peripheral Intravenous Line                 Peripheral IV - Single Lumen 12/26/21 20 G Right Forearm 3 days         Peripheral IV - Single Lumen 12/27/21 1346 20 G Right Antecubital 2 days                Physical Exam the patient is alert and oriented.  She is in no distress.  Lungs are clear anteriorly  Heart sounds are distant and irregular  Extremities without edema  Significant Labs:   CMP   Recent Labs   Lab 12/28/21  0352 12/29/21  0333    142   K 3.8 2.8*   CL 97 92*   CO2 31* 36*   * 99   BUN 22 21   CREATININE 1.4 1.6*   CALCIUM 8.7 8.6*   PROT 5.6* 5.9*   ALBUMIN 2.5* 2.7*    BILITOT 0.4 0.5   ALKPHOS 58 56   AST 12 13   ALT 10 9*   ANIONGAP 14 14   ESTGFRAFRICA 42* 36*   EGFRNONAA 37* 31*    and CBC   Recent Labs   Lab 12/28/21  0352 12/28/21  0352 12/29/21  0333   WBC 13.03*  --  10.23   HGB 7.0*  --  7.1*   HCT 23.8*   < > 24.1*     --  350    < > = values in this interval not displayed.       Significant Imaging:  Chest x-ray with mild improvement  Assessment and Plan:  Atrial fibrillation.  The patient is now in multifocal atrial tachycardia.  Cardizem can be transitioned to p.o. at any time.  I would keep her off of anticoagulation for at least several weeks.         Active Diagnoses:    Diagnosis Date Noted POA    JOSE (acute kidney injury) [N17.9] 12/29/2021 No    Rectal bleeding [K62.5]  No    Chronic anemia [D64.9] 10/22/2021 Yes    Chronic hypotension on midodrine [I95.9] 10/22/2021 Yes     Chronic    Acute on chronic heart failure with preserved ejection fraction [I50.33] 10/14/2021 Yes    Paroxysmal atrial fibrillation [I48.0] 10/12/2021 Yes    Acute on chronic respiratory failure with hypoxia and hypercapnia [J96.21, J96.22] 08/03/2021 Yes    COPD (chronic obstructive pulmonary disease) [J44.9] 08/03/2021 Yes     Chronic      Problems Resolved During this Admission:       VTE Risk Mitigation (From admission, onward)         Ordered     Place sequential compression device  Until discontinued         12/27/21 0302                Adnois Mcmullen MD  Cardiology  [facility]

## 2021-12-29 NOTE — ASSESSMENT & PLAN NOTE
-IV diuresis held given JOSE  -Telemetry  -Cardiology consulted  -Strict I's and O's  -K > 4 and Mg > 2

## 2021-12-29 NOTE — ASSESSMENT & PLAN NOTE
In setting of acute CHF exacerbation.  -BiPAP PRN  -NC O2  -CXR and ABG PRN  -Pulmonary consulted  -Zosyn for possible pneumonia  -Hold diuresis given JOSE

## 2021-12-29 NOTE — PROGRESS NOTES
Colonoscopy done.  Patient has multiple large polyps and polypoid lesions too numerous to count or to remove endoscopically.  The largest of these occupies almost the entire descending colon and biopsies were taken.  She also has a mass at the hepatic flexure that is almost certainly cancerous and this was biopsied and tattooed.  Given the findings and extensive involvement of the entire colon, it is likely that she will need a total colectomy.  Discussed with Dr. Larose who will see the patient in consultation.  Will order imaging and CEA for staging.  Will follow.

## 2021-12-29 NOTE — NURSING
MD Cunningham @ bedside. Made aware potassium cont's burn pt even with decreasing rate. Requested Midline. Md stated he would increase oral dose of K+.    Awaiting updated orders. D/c PIV potassium.

## 2021-12-30 PROBLEM — D50.0 ANEMIA DUE TO CHRONIC BLOOD LOSS: Status: ACTIVE | Noted: 2021-10-22

## 2021-12-30 PROBLEM — E87.6 HYPOKALEMIA: Status: ACTIVE | Noted: 2021-12-30

## 2021-12-30 LAB
ALBUMIN SERPL BCP-MCNC: 2.8 G/DL (ref 3.5–5.2)
ALP SERPL-CCNC: 61 U/L (ref 55–135)
ALT SERPL W/O P-5'-P-CCNC: 12 U/L (ref 10–44)
ANION GAP SERPL CALC-SCNC: 13 MMOL/L (ref 8–16)
APPEARANCE FLD: NORMAL
AST SERPL-CCNC: 13 U/L (ref 10–40)
BASOPHILS # BLD AUTO: 0.02 K/UL (ref 0–0.2)
BASOPHILS NFR BLD: 0.2 % (ref 0–1.9)
BILIRUB SERPL-MCNC: 0.5 MG/DL (ref 0.1–1)
BODY FLD TYPE: NORMAL
BODY FLUID SOURCE, LDH: NORMAL
BUN SERPL-MCNC: 13 MG/DL (ref 8–23)
CALCIUM SERPL-MCNC: 9.4 MG/DL (ref 8.7–10.5)
CHLORIDE SERPL-SCNC: 91 MMOL/L (ref 95–110)
CO2 SERPL-SCNC: 35 MMOL/L (ref 23–29)
COLOR FLD: YELLOW
CREAT SERPL-MCNC: 1.3 MG/DL (ref 0.5–1.4)
DIFFERENTIAL METHOD: ABNORMAL
EOSINOPHIL # BLD AUTO: 0.1 K/UL (ref 0–0.5)
EOSINOPHIL NFR BLD: 0.7 % (ref 0–8)
ERYTHROCYTE [DISTWIDTH] IN BLOOD BY AUTOMATED COUNT: 15.2 % (ref 11.5–14.5)
EST. GFR  (AFRICAN AMERICAN): 46 ML/MIN/1.73 M^2
EST. GFR  (NON AFRICAN AMERICAN): 40 ML/MIN/1.73 M^2
GLUCOSE FLD-MCNC: 108 MG/DL
GLUCOSE SERPL-MCNC: 107 MG/DL (ref 70–110)
HCT VFR BLD AUTO: 29.3 % (ref 37–48.5)
HGB BLD-MCNC: 8.6 G/DL (ref 12–16)
IMM GRANULOCYTES # BLD AUTO: 0.06 K/UL (ref 0–0.04)
IMM GRANULOCYTES NFR BLD AUTO: 0.6 % (ref 0–0.5)
LDH FLD L TO P-CCNC: 78 U/L
LYMPHOCYTES # BLD AUTO: 1.5 K/UL (ref 1–4.8)
LYMPHOCYTES NFR BLD: 14.4 % (ref 18–48)
LYMPHOCYTES NFR FLD MANUAL: 27 %
MAGNESIUM SERPL-MCNC: 2.1 MG/DL (ref 1.6–2.6)
MCH RBC QN AUTO: 27.7 PG (ref 27–31)
MCHC RBC AUTO-ENTMCNC: 29.4 G/DL (ref 32–36)
MCV RBC AUTO: 94 FL (ref 82–98)
MONOCYTES # BLD AUTO: 0.4 K/UL (ref 0.3–1)
MONOCYTES NFR BLD: 4 % (ref 4–15)
MONOS+MACROS NFR FLD MANUAL: 45 %
NEUTROPHILS # BLD AUTO: 8.6 K/UL (ref 1.8–7.7)
NEUTROPHILS NFR BLD: 80.1 % (ref 38–73)
NEUTROPHILS NFR FLD MANUAL: 28 %
NRBC BLD-RTO: 0 /100 WBC
PHOSPHATE SERPL-MCNC: 3.6 MG/DL (ref 2.7–4.5)
PLATELET # BLD AUTO: 381 K/UL (ref 150–450)
PMV BLD AUTO: 9.1 FL (ref 9.2–12.9)
POTASSIUM SERPL-SCNC: 3.9 MMOL/L (ref 3.5–5.1)
PROT FLD-MCNC: 2.2 G/DL
PROT SERPL-MCNC: 6.2 G/DL (ref 6–8.4)
RBC # BLD AUTO: 3.11 M/UL (ref 4–5.4)
SODIUM SERPL-SCNC: 139 MMOL/L (ref 136–145)
SPECIMEN SOURCE: NORMAL
SPECIMEN SOURCE: NORMAL
WBC # BLD AUTO: 10.73 K/UL (ref 3.9–12.7)
WBC # FLD: 417 /CU MM

## 2021-12-30 PROCEDURE — 84100 ASSAY OF PHOSPHORUS: CPT | Performed by: STUDENT IN AN ORGANIZED HEALTH CARE EDUCATION/TRAINING PROGRAM

## 2021-12-30 PROCEDURE — 88305 TISSUE EXAM BY PATHOLOGIST: CPT | Performed by: STUDENT IN AN ORGANIZED HEALTH CARE EDUCATION/TRAINING PROGRAM

## 2021-12-30 PROCEDURE — 25000003 PHARM REV CODE 250: Performed by: STUDENT IN AN ORGANIZED HEALTH CARE EDUCATION/TRAINING PROGRAM

## 2021-12-30 PROCEDURE — 83615 LACTATE (LD) (LDH) ENZYME: CPT | Performed by: INTERNAL MEDICINE

## 2021-12-30 PROCEDURE — 99232 PR SUBSEQUENT HOSPITAL CARE,LEVL II: ICD-10-PCS | Mod: ,,, | Performed by: STUDENT IN AN ORGANIZED HEALTH CARE EDUCATION/TRAINING PROGRAM

## 2021-12-30 PROCEDURE — 85025 COMPLETE CBC W/AUTO DIFF WBC: CPT | Performed by: STUDENT IN AN ORGANIZED HEALTH CARE EDUCATION/TRAINING PROGRAM

## 2021-12-30 PROCEDURE — 88305 TISSUE EXAM BY PATHOLOGIST: CPT | Mod: 26,,, | Performed by: STUDENT IN AN ORGANIZED HEALTH CARE EDUCATION/TRAINING PROGRAM

## 2021-12-30 PROCEDURE — 63600175 PHARM REV CODE 636 W HCPCS: Performed by: STUDENT IN AN ORGANIZED HEALTH CARE EDUCATION/TRAINING PROGRAM

## 2021-12-30 PROCEDURE — 63600175 PHARM REV CODE 636 W HCPCS: Performed by: INTERNAL MEDICINE

## 2021-12-30 PROCEDURE — 94640 AIRWAY INHALATION TREATMENT: CPT

## 2021-12-30 PROCEDURE — 83735 ASSAY OF MAGNESIUM: CPT | Performed by: STUDENT IN AN ORGANIZED HEALTH CARE EDUCATION/TRAINING PROGRAM

## 2021-12-30 PROCEDURE — 80053 COMPREHEN METABOLIC PANEL: CPT | Performed by: STUDENT IN AN ORGANIZED HEALTH CARE EDUCATION/TRAINING PROGRAM

## 2021-12-30 PROCEDURE — 89051 BODY FLUID CELL COUNT: CPT | Performed by: INTERNAL MEDICINE

## 2021-12-30 PROCEDURE — 88112 PR  CYTOPATH, CELL ENHANCE TECH: ICD-10-PCS | Mod: 26,,, | Performed by: STUDENT IN AN ORGANIZED HEALTH CARE EDUCATION/TRAINING PROGRAM

## 2021-12-30 PROCEDURE — 27000221 HC OXYGEN, UP TO 24 HOURS

## 2021-12-30 PROCEDURE — 99232 SBSQ HOSP IP/OBS MODERATE 35: CPT | Mod: ,,, | Performed by: STUDENT IN AN ORGANIZED HEALTH CARE EDUCATION/TRAINING PROGRAM

## 2021-12-30 PROCEDURE — 87205 SMEAR GRAM STAIN: CPT | Performed by: INTERNAL MEDICINE

## 2021-12-30 PROCEDURE — 84157 ASSAY OF PROTEIN OTHER: CPT | Performed by: INTERNAL MEDICINE

## 2021-12-30 PROCEDURE — 99232 SBSQ HOSP IP/OBS MODERATE 35: CPT | Mod: ,,, | Performed by: INTERNAL MEDICINE

## 2021-12-30 PROCEDURE — 99232 PR SUBSEQUENT HOSPITAL CARE,LEVL II: ICD-10-PCS | Mod: ,,, | Performed by: INTERNAL MEDICINE

## 2021-12-30 PROCEDURE — 88112 CYTOPATH CELL ENHANCE TECH: CPT | Performed by: STUDENT IN AN ORGANIZED HEALTH CARE EDUCATION/TRAINING PROGRAM

## 2021-12-30 PROCEDURE — 99233 SBSQ HOSP IP/OBS HIGH 50: CPT | Mod: ,,, | Performed by: INTERNAL MEDICINE

## 2021-12-30 PROCEDURE — 99233 PR SUBSEQUENT HOSPITAL CARE,LEVL III: ICD-10-PCS | Mod: ,,, | Performed by: INTERNAL MEDICINE

## 2021-12-30 PROCEDURE — 87070 CULTURE OTHR SPECIMN AEROBIC: CPT | Performed by: INTERNAL MEDICINE

## 2021-12-30 PROCEDURE — 36415 COLL VENOUS BLD VENIPUNCTURE: CPT | Performed by: STUDENT IN AN ORGANIZED HEALTH CARE EDUCATION/TRAINING PROGRAM

## 2021-12-30 PROCEDURE — 94761 N-INVAS EAR/PLS OXIMETRY MLT: CPT

## 2021-12-30 PROCEDURE — 11000001 HC ACUTE MED/SURG PRIVATE ROOM

## 2021-12-30 PROCEDURE — 88305 TISSUE EXAM BY PATHOLOGIST: ICD-10-PCS | Mod: 26,,, | Performed by: STUDENT IN AN ORGANIZED HEALTH CARE EDUCATION/TRAINING PROGRAM

## 2021-12-30 PROCEDURE — 88112 CYTOPATH CELL ENHANCE TECH: CPT | Mod: 26,,, | Performed by: STUDENT IN AN ORGANIZED HEALTH CARE EDUCATION/TRAINING PROGRAM

## 2021-12-30 PROCEDURE — 82945 GLUCOSE OTHER FLUID: CPT | Performed by: INTERNAL MEDICINE

## 2021-12-30 PROCEDURE — 25000242 PHARM REV CODE 250 ALT 637 W/ HCPCS: Performed by: STUDENT IN AN ORGANIZED HEALTH CARE EDUCATION/TRAINING PROGRAM

## 2021-12-30 RX ORDER — FUROSEMIDE 10 MG/ML
40 INJECTION INTRAMUSCULAR; INTRAVENOUS DAILY
Status: DISCONTINUED | OUTPATIENT
Start: 2021-12-30 | End: 2022-01-03

## 2021-12-30 RX ORDER — DIGOXIN 0.25 MG/ML
125 INJECTION INTRAMUSCULAR; INTRAVENOUS DAILY
Status: DISCONTINUED | OUTPATIENT
Start: 2021-12-30 | End: 2022-01-01

## 2021-12-30 RX ADMIN — PIPERACILLIN AND TAZOBACTAM 4.5 G: 4; .5 INJECTION, POWDER, LYOPHILIZED, FOR SOLUTION INTRAVENOUS; PARENTERAL at 11:12

## 2021-12-30 RX ADMIN — MIRTAZAPINE 15 MG: 15 TABLET, FILM COATED ORAL at 08:12

## 2021-12-30 RX ADMIN — MIDODRINE HYDROCHLORIDE 5 MG: 2.5 TABLET ORAL at 08:12

## 2021-12-30 RX ADMIN — LORAZEPAM 0.5 MG: 2 INJECTION INTRAMUSCULAR; INTRAVENOUS at 11:12

## 2021-12-30 RX ADMIN — PIPERACILLIN AND TAZOBACTAM 4.5 G: 4; .5 INJECTION, POWDER, LYOPHILIZED, FOR SOLUTION INTRAVENOUS; PARENTERAL at 01:12

## 2021-12-30 RX ADMIN — FLUTICASONE FUROATE AND VILANTEROL TRIFENATATE 1 PUFF: 100; 25 POWDER RESPIRATORY (INHALATION) at 07:12

## 2021-12-30 RX ADMIN — IPRATROPIUM BROMIDE AND ALBUTEROL SULFATE 3 ML: 2.5; .5 SOLUTION RESPIRATORY (INHALATION) at 06:12

## 2021-12-30 RX ADMIN — MUPIROCIN: 20 OINTMENT TOPICAL at 09:12

## 2021-12-30 RX ADMIN — FUROSEMIDE 40 MG: 10 INJECTION, SOLUTION INTRAMUSCULAR; INTRAVENOUS at 11:12

## 2021-12-30 RX ADMIN — MUPIROCIN: 20 OINTMENT TOPICAL at 08:12

## 2021-12-30 RX ADMIN — DIPHENHYDRAMINE HYDROCHLORIDE 12.5 MG: 50 INJECTION INTRAMUSCULAR; INTRAVENOUS at 03:12

## 2021-12-30 RX ADMIN — LIDOCAINE 5% 1 PATCH: 700 PATCH TOPICAL at 03:12

## 2021-12-30 RX ADMIN — POTASSIUM & SODIUM PHOSPHATES POWDER PACK 280-160-250 MG 1 PACKET: 280-160-250 PACK at 08:12

## 2021-12-30 RX ADMIN — DIGOXIN 125 MCG: 250 INJECTION, SOLUTION INTRAMUSCULAR; INTRAVENOUS at 03:12

## 2021-12-30 RX ADMIN — PIPERACILLIN AND TAZOBACTAM 4.5 G: 4; .5 INJECTION, POWDER, LYOPHILIZED, FOR SOLUTION INTRAVENOUS; PARENTERAL at 09:12

## 2021-12-30 RX ADMIN — IPRATROPIUM BROMIDE AND ALBUTEROL SULFATE 3 ML: 2.5; .5 SOLUTION RESPIRATORY (INHALATION) at 07:12

## 2021-12-30 RX ADMIN — ONDANSETRON 8 MG: 2 INJECTION INTRAMUSCULAR; INTRAVENOUS at 09:12

## 2021-12-30 RX ADMIN — DILTIAZEM HYDROCHLORIDE 0 MG/HR: 5 INJECTION INTRAVENOUS at 11:12

## 2021-12-30 RX ADMIN — LORAZEPAM 0.5 MG: 2 INJECTION INTRAMUSCULAR; INTRAVENOUS at 04:12

## 2021-12-30 RX ADMIN — IPRATROPIUM BROMIDE AND ALBUTEROL SULFATE 3 ML: 2.5; .5 SOLUTION RESPIRATORY (INHALATION) at 12:12

## 2021-12-30 RX ADMIN — PIPERACILLIN AND TAZOBACTAM 4.5 G: 4; .5 INJECTION, POWDER, LYOPHILIZED, FOR SOLUTION INTRAVENOUS; PARENTERAL at 03:12

## 2021-12-30 RX ADMIN — LORAZEPAM 0.5 MG: 2 INJECTION INTRAMUSCULAR; INTRAVENOUS at 05:12

## 2021-12-30 RX ADMIN — ACETAMINOPHEN 650 MG: 325 TABLET ORAL at 10:12

## 2021-12-30 RX ADMIN — DILTIAZEM HYDROCHLORIDE 10 MG/HR: 5 INJECTION INTRAVENOUS at 10:12

## 2021-12-30 NOTE — PROGRESS NOTES
[facility]  Cardiology  Progress Note    Patient Name: Patrizia Valadez  MRN: [id number]  Admission Date: 12/26/2021  Hospital Length of Stay: 4 days  Code Status: DNR   Attending Physician: Reyes Olmstead MD   Primary Care Physician: Anya Farr NP  Expected Discharge Date: 12/31/2021  Principal Problem:<principal problem not specified>    Subjective:     Hospital Course:  The patient underwent colonoscopy with numerous large polyps and probable malignancy noted.  During the night she has developed nausea and vomiting.  CT scan demonstrates a small bowel obstruction.        ROS  Objective:     Vital Signs (Most Recent):  Temp: 98.1 °F (36.7 °C) (12/30/21 1112)  Pulse: (!) 116 (12/30/21 1112)  Resp: 19 (12/30/21 1112)  BP: 130/72 (12/30/21 1112)  SpO2: 96 % (12/30/21 1112) Vital Signs (24h Range):  Temp:  [96.5 °F (35.8 °C)-98.4 °F (36.9 °C)] 98.1 °F (36.7 °C)  Pulse:  [] 116  Resp:  [15-51] 19  SpO2:  [92 %-100 %] 96 %  BP: (105-149)/(52-72) 130/72     Weight: 47.9 kg (105 lb 9.6 oz)  Body mass index is 18.71 kg/m².    SpO2: 96 %  O2 Device (Oxygen Therapy): nasal cannula      Intake/Output Summary (Last 24 hours) at 12/30/2021 1130  Last data filed at 12/30/2021 0617  Gross per 24 hour   Intake 1106.25 ml   Output 1850 ml   Net -743.75 ml       Lines/Drains/Airways     Drain                 Urethral Catheter 12/26/21 2050 16 Fr. 3 days          Peripheral Intravenous Line                 Peripheral IV - Single Lumen 12/26/21 20 G Right Forearm 4 days         Peripheral IV - Single Lumen 12/29/21 0900 20 G Anterior;Distal;Right Forearm 1 day                  Significant Labs:   CMP   Recent Labs   Lab 12/29/21  0333 12/29/21  1541 12/30/21  0629    143 139   K 2.8* 4.2 3.9   CL 92* 94* 91*   CO2 36* 34* 35*   GLU 99 96 107   BUN 21 16 13   CREATININE 1.6* 1.4 1.3   CALCIUM 8.6* 9.1 9.4   PROT 5.9*  --  6.2   ALBUMIN 2.7*  --  2.8*   BILITOT 0.5  --  0.5   ALKPHOS 56  --  61   AST  13  --  13   ALT 9*  --  12   ANIONGAP 14 15 13   ESTGFRAFRICA 36* 42* 46*   EGFRNONAA 31* 37* 40*    and CBC   Recent Labs   Lab 12/29/21  0333 12/29/21  0333 12/30/21  0629   WBC 10.23  --  10.73   HGB 7.1*  --  8.6*   HCT 24.1*   < > 29.3*     --  381    < > = values in this interval not displayed.       Significant Imaging: CT of the abdomen:    Findings consistent small-bowel obstruction with dilated loops of small bowel some leading into the transition zone having differential wall thickening.  Small amount of free fluid the pelvis.   Multiple sites of circumferential wall thickening of the colon suggesting more so involving the hepatic flexure near the junction with the transverse colon.  There is diverticulosis.  Findings concerning however for mass or colitis more so than diverticulitis    Assessment and Plan:  Atrial fibrillation with rapid ventricular response.  She is on IV Cardizem 10 milligrams/hour.  This should be continued.  Because of vomiting and bowel obstruction I will change digoxin to IV.  She is obviously a poor surgical candidate because of her lung disease and cardiac arrhythmia.  LV function is normal.         Active Diagnoses:    Diagnosis Date Noted POA    JOSE (acute kidney injury) [N17.9] 12/29/2021 No    Acute respiratory failure [J96.00]  Yes    Rectal bleeding [K62.5]  No    Chronic anemia [D64.9] 10/22/2021 Yes    Chronic hypotension on midodrine [I95.9] 10/22/2021 Yes     Chronic    Acute on chronic diastolic congestive heart failure [I50.33] 10/14/2021 Yes    Paroxysmal atrial fibrillation [I48.0] 10/12/2021 Yes    Acute on chronic respiratory failure with hypoxia and hypercapnia [J96.21, J96.22] 08/03/2021 Yes    COPD (chronic obstructive pulmonary disease) [J44.9] 08/03/2021 Yes     Chronic      Problems Resolved During this Admission:       VTE Risk Mitigation (From admission, onward)         Ordered     Place sequential compression device  Until discontinued          12/27/21 0302                Adonis Mcmullen MD  Cardiology  [facility]

## 2021-12-30 NOTE — PLAN OF CARE
Problem: Adult Inpatient Plan of Care  Goal: Plan of Care Review  Outcome: Ongoing, Progressing     Problem: Adult Inpatient Plan of Care  Goal: Absence of Hospital-Acquired Illness or Injury  Outcome: Ongoing, Progressing     Problem: Infection  Goal: Absence of Infection Signs and Symptoms  Outcome: Ongoing, Progressing     Problem: Adult Inpatient Plan of Care  Goal: Optimal Comfort and Wellbeing  Outcome: Ongoing, Progressing       Cont pt on Cardizem drip. Pt cont to go into A. Fib/ w/ RVR highest rate 130.   Pt remained NPO for Colonoscopy.  Colonoscopy completed, results discussed with Pt and Pt's daughter.    Pt transferred to .    Prior to transfer Pt completed one bottle of oral contrast. 2nd Bottle given to pt once on PCU. After 30mins Nurse call CT to  pt in new room (211).

## 2021-12-30 NOTE — PROGRESS NOTES
Progress Note  PULMONARY    Admit Date: 12/26/2021 12/30/2021      SUBJECTIVE:     12/28- continues on bipap, tele with atrial fib. Hgb dropping to 7. Diuresed well with -2L yesterday. Feels better  12/29- resp status improved, on 2L NC. Diuresing well. Potassium low at 2.8 today  12/30- resp status doing well on 2L oxygen. She was found to have numerous colon polyps and mass on colonoscopy yesterday.      OBJECTIVE:     Vitals (Most recent):  Vitals:    12/30/21 0737   BP: 124/60   Pulse: (!) 126   Resp: 18   Temp: 97.3 °F (36.3 °C)       Vitals (24 hour range):  Temp:  [96.5 °F (35.8 °C)-98.4 °F (36.9 °C)]   Pulse:  []   Resp:  [15-58]   BP: (101-149)/(52-65)   SpO2:  [92 %-100 %]       Intake/Output Summary (Last 24 hours) at 12/30/2021 1014  Last data filed at 12/30/2021 0617  Gross per 24 hour   Intake 1106.25 ml   Output 1850 ml   Net -743.75 ml          Physical Exam:  The patient's neuro status (alertness,orientation,cognitive function,motor skills,), pharyngeal exam (oral lesions, hygiene, abn dentition,), Neck (jvd,mass,thyroid,nodes in neck and above/below clavicle),RESPIRATORY(symmetry,effort,fremitus,percussion,auscultation),  Cor(rhythm,heart tones including gallops,perfusion,edema)ABD(distention,hepatic&splenomegaly,tenderness,masses), Skin(rash,cyanosis),Psyc(affect,judgement,).  Exam negative except for these pertinent findings:    Awake, alert, no distress  Bilateral diminished breath sounds   HR irregularly irreg, tachycardic  Abdomen soft, nontender  No edema        Radiographs reviewed: view by direct vision   CT chest 12/29/21- spiculated nodules rul and rll, bilateral moderate pleural effusions  CXR 12/29- improved pulm edema, continued small pleural effusions bilaterally  CXR 12/26- bilat effusions, bilat basilar airspace disease with pulmonary vascular congestion, new compared to prior film 11/2021.  CT chest 8/3/21- emphysematous changes, hyperinflation, mild ggos in the  bases     Labs     Recent Labs   Lab 12/30/21  0629   WBC 10.73   HGB 8.6*   HCT 29.3*        Recent Labs   Lab 12/30/21  0629      K 3.9   CL 91*   CO2 35*   BUN 13   CREATININE 1.3      CALCIUM 9.4   MG 2.1   PHOS 3.6   AST 13   ALT 12   ALKPHOS 61   BILITOT 0.5   PROT 6.2   ALBUMIN 2.8*   No results for input(s): PH, PCO2, PO2, HCO3 in the last 24 hours.  Microbiology Results (last 7 days)     Procedure Component Value Units Date/Time    Blood culture x two cultures. Draw prior to antibiotics. [[id number]]  (Abnormal) Collected: 12/26/21 1910    Order Status: Completed Specimen: Blood Updated: 12/30/21 0733     Blood Culture, Routine Gram stain aer bottle: Gram negative rods      Results called to and read back by: Sam Catalan RN. 12/29/2021  21:00      GRAM NEGATIVE HUSSAIN  Identification and susceptibility pending      Narrative:      Aerobic and anaerobic    Blood culture x two cultures. Draw prior to antibiotics. [[id number]] Collected: 12/26/21 1902    Order Status: Completed Specimen: Blood from Peripheral, Right Updated: 12/30/21 0612     Blood Culture, Routine No Growth to date      No Growth to date      No Growth to date      No Growth to date    Narrative:      Aerobic and anaerobic          Impression:  Active Hospital Problems    Diagnosis  POA    JOSE (acute kidney injury) [N17.9]  No    Acute respiratory failure [J96.00]  Yes    Rectal bleeding [K62.5]  No    Chronic anemia [D64.9]  Yes    Chronic hypotension on midodrine [I95.9]  Yes     Chronic    Acute on chronic diastolic congestive heart failure [I50.33]  Yes    Paroxysmal atrial fibrillation [I48.0]  Yes    Acute on chronic respiratory failure with hypoxia and hypercapnia [J96.21, J96.22]  Yes    COPD (chronic obstructive pulmonary disease) [J44.9]  Yes     Chronic      Resolved Hospital Problems   No resolved problems to display.               Plan:     - continue supplemental O2 to keep sats 89-92%  - restart lasix  40mg daily  - diagnostic thoracentesis recommended- would tap the right effusion and send cytology, cell counts/diff, LDH, protein, glucose and culture- per IR  - continue breo inhaler  - continue nebulized bronchodilators  - continue zosyn in abundance of caution  - cardiology following  - numerous colon polyps and mass with concern for malignancy  - monitor Hgb; transfuse as needed to maintain >7  - evaristo Cash MD  Pulmonary & Critical Care Medicine

## 2021-12-30 NOTE — PT/OT/SLP PROGRESS
Physical Therapy      Patient Name:  Patrizia Valadez   MRN:  [id number]    Patient not seen today secondary to Patient ill (Comment),Patient unwilling to participate (patient refused due to max nausea and throwing up this morning.). Will follow-up 12/31/21.

## 2021-12-30 NOTE — NURSING
Transported patient via stretcher from room 211 to ultrasound on 2 liters O2, cardizem gtt @ 10mg/hr for ultrasound guided diagnostic thoracentesis. Procedure explained and consent obtained by dr yang. 550 ml of straw pleural fluid drained from right pleural space. Specimens collected and submitted to lab per orders. VS monitored and patient remained stable for duration of procedure.   Stat chest xray ordered and performed.   Timed chest xray ordered and scheduled at 1830   Patient transported back to room via stretcher in stable condition.

## 2021-12-30 NOTE — CONSULTS
[facility] Surgery  Consult Note    Consults  Subjective:     Chief Complaint/Reason for Admission:  Altered mental status and shortness of breath    History of Present Illness:  This is a 75-year-old female with COPD, chronic respiratory failure on oxygen at home, history of lymphoma, congestive heart failure who was admitted for altered mental status and respiratory distress.  She has since recovered from a respiratory standpoint back to baseline.  She had a bloody bowel movement which prompted endoscopy today.  On endoscopy, she had multiple large polypoid lesions throughout the entire colon, too numerous into large to remove endoscopically.  She also had a larger mass in the hepatic flexure which is highly suspicious for malignancy.  I was consulted for possible surgical intervention in the future.    No current facility-administered medications on file prior to encounter.     Current Outpatient Medications on File Prior to Encounter   Medication Sig    acetaminophen (TYLENOL) 325 MG tablet Take 650 mg by mouth every 6 (six) hours as needed for Pain.    albuterol (VENTOLIN HFA) 90 mcg/actuation inhaler Inhale 2 puffs into the lungs every 6 (six) hours as needed for Wheezing. Rescue    alendronate (FOSAMAX) 70 MG tablet Take 0.5 tablets (35 mg total) by mouth every 7 days.    ALPRAZolam (XANAX) 0.5 MG tablet Take 1 tablet (0.5 mg total) by mouth 2 (two) times daily as needed for Anxiety.    apixaban (ELIQUIS) 2.5 mg Tab Take 1 tablet (2.5 mg total) by mouth 2 (two) times daily.    CYANOCOBALAMIN, VITAMIN B-12, ORAL Take 1 tablet by mouth once daily.    diltiaZEM (CARDIZEM CD) 180 MG 24 hr capsule Take 1 capsule (180 mg total) by mouth once daily.    ferrous sulfate (IRON) 325 mg (65 mg iron) Tab tablet Take 1 tablet (325 mg total) by mouth 2 (two) times daily.    fluticasone-umeclidin-vilanter (TRELEGY ELLIPTA) 100-62.5-25 mcg DsDv Inhale 1 puff into the lungs once daily.     furosemide (LASIX) 20 MG tablet Take 1 tablet (20 mg total) by mouth once daily.    metoprolol tartrate (LOPRESSOR) 25 MG tablet Take 1 tablet (25 mg total) by mouth 2 (two) times daily.    midodrine (PROAMATINE) 5 MG Tab Take 5 mg by mouth 3 (three) times daily.    mirtazapine (REMERON) 15 MG tablet Take 1 tablet (15 mg total) by mouth every evening.    multivitamin with minerals tablet Take 1 tablet by mouth once daily.    omega 3-dha-epa-fish oil 1,000 mg (120 mg-180 mg) Cap Take 1 capsule by mouth once daily.    ondansetron (ZOFRAN) 4 MG tablet Take 4 mg by mouth every 6 (six) hours as needed for Nausea.    pantoprazole (PROTONIX) 40 MG tablet Take 1 tablet (40 mg total) by mouth once daily.    polyethylene glycol (GLYCOLAX) 17 gram/dose powder Take 17 g by mouth once daily.    potassium chloride (KLOR-CON) 10 MEQ TbSR Take 1 tablet (10 mEq total) by mouth once daily.    vitamin D (VITAMIN D3) 1000 units Tab Take 1,000 Units by mouth once daily.       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    Respiratory failure, chronic      Past Surgical History:   Procedure Laterality Date    CHOLECYSTECTOMY      ESOPHAGOGASTRODUODENOSCOPY N/A 10/23/2021    Procedure: EGD (ESOPHAGOGASTRODUODENOSCOPY);  Surgeon: Jaja Hoover MD;  Location: [facility];  Service: Endoscopy;  Laterality: N/A;    HYSTERECTOMY      NO PAST SURGERIES       Family History     Problem Relation (Age of Onset)    Hypertension Mother        Tobacco Use    Smoking status: Former Smoker     Packs/day: 0.25     Years: 60.00     Pack years: 15.00     Types: Cigarettes     Quit date: 2012     Years since quittin.3    Smokeless tobacco: Never Used    Tobacco comment:  Ex smoker 2011   Substance and Sexual Activity    Alcohol use: Not Currently    Drug use: Not on file    Sexual activity: Not on file     Review of Systems   Constitutional: Negative for fever.   HENT: Negative.    Eyes: Negative.    Respiratory: Positive for shortness of breath.    Cardiovascular: Negative.    Gastrointestinal: Negative.    Endocrine: Negative.    Genitourinary: Negative.    Musculoskeletal: Negative.    Skin: Negative.    Allergic/Immunologic: Negative.    Neurological: Negative.    Hematological: Negative.    Psychiatric/Behavioral: Negative.      Objective:     Vital Signs (Most Recent):  Temp: 98.2 °F (36.8 °C) (12/29/21 0400)  Pulse: 105 (12/29/21 1640)  Resp: (!) 50 (12/29/21 1640)  BP: 120/60 (12/29/21 1640)  SpO2: 99 % (12/29/21 1640) Vital Signs (24h Range):  Temp:  [97.8 °F (36.6 °C)-98.9 °F (37.2 °C)] 98.2 °F (36.8 °C)  Pulse:  [] 105  Resp:  [16-69] 50  SpO2:  [92 %-100 %] 99 %  BP: ()/(46-61) 120/60     Weight: 48.1 kg (106 lb 0.7 oz)  Body mass index is 18.78 kg/m².      Intake/Output Summary (Last 24 hours) at 12/29/2021 1854  Last data filed at 12/29/2021 0816  Gross per 24 hour   Intake 636.87 ml   Output 1600 ml   Net -963.13 ml       Physical Exam  Constitutional:       General: She is not in acute distress.     Appearance: Normal appearance. She is not ill-appearing, toxic-appearing or diaphoretic.   HENT:      Head: Normocephalic.      Nose: Nose normal.   Eyes:      Conjunctiva/sclera: Conjunctivae normal.   Cardiovascular:      Rate and Rhythm: Normal rate and regular rhythm.   Pulmonary:      Effort: Pulmonary effort is normal.      Comments: On oxygen  Abdominal:      Palpations: Abdomen is soft.   Musculoskeletal:         General: Normal range of motion.      Cervical back: Normal range of motion.   Skin:     General: Skin is warm.   Neurological:      General: No focal deficit present.      Mental Status: She is alert.   Psychiatric:         Mood and  Affect: Mood normal.         Significant Labs:  CBC:   Recent Labs   Lab 12/29/21  0333   WBC 10.23   RBC 2.55*   HGB 7.1*   HCT 24.1*      MCV 95   MCH 27.8   MCHC 29.5*     CMP:   Recent Labs   Lab 12/29/21  0333 12/29/21  0333 12/29/21  1541   GLU 99   < > 96   CALCIUM 8.6*   < > 9.1   ALBUMIN 2.7*  --   --    PROT 5.9*  --   --       < > 143   K 2.8*   < > 4.2   CO2 36*   < > 34*   CL 92*   < > 94*   BUN 21   < > 16   CREATININE 1.6*   < > 1.4   ALKPHOS 56  --   --    ALT 9*  --   --    AST 13  --   --    BILITOT 0.5  --   --     < > = values in this interval not displayed.     Coagulation: No results for input(s): PT, INR, APTT in the last 48 hours.  Lactic Acid: No results for input(s): LACTATE in the last 48 hours.     Significant Diagnostics:  Echo  · The estimated PA systolic pressure is 46 mmHg.  · Concentric remodeling and normal systolic function.  · The estimated ejection fraction is 55%.  · Normal left ventricular diastolic function.  · Normal right ventricular size with normal right ventricular systolic function.  · Moderate mitral regurgitation.  · Moderate to severe tricuspid regurgitation        Assessment/Plan:     Active Diagnoses:    Diagnosis Date Noted POA    JOSE (acute kidney injury) [N17.9] 12/29/2021 No    Acute respiratory failure [J96.00]  Yes    Rectal bleeding [K62.5]  No    Chronic anemia [D64.9] 10/22/2021 Yes    Chronic hypotension on midodrine [I95.9] 10/22/2021 Yes     Chronic    Acute on chronic diastolic congestive heart failure [I50.33] 10/14/2021 Yes    Paroxysmal atrial fibrillation [I48.0] 10/12/2021 Yes    Acute on chronic respiratory failure with hypoxia and hypercapnia [J96.21, J96.22] 08/03/2021 Yes    COPD (chronic obstructive pulmonary disease) [J44.9] 08/03/2021 Yes     Chronic      Problems Resolved During this Admission:       This is a 75-year-old female with COPD on oxygen, atrial fibrillation who is normally on anticoagulation, chronic  anemia, chronic hypotension, kidney disease who was admitted with altered mental status and shortness of breath from assisted living.  She has since recovered.  She had a bloody bowel movement.  She underwent colonoscopy today which showed multiple large polypoid lesions that were too numerous to count or remove endoscopically.  She also has a larger mass in the hepatic flexure which is very concerning for malignancy.    Patient has pending staging workup with CT imaging of the chest abdomen and pelvis as well as CEA levels.    Operative intervention would likely be total abdominal colectomy with end ileostomy given her diffuse disease and medical problems  Will likely see as an outpatient and schedule elective surgery once patient is medically optimized.  If it is felt that she will continue to decline at her nursing facility after DC, can consider inpatient colectomy pending staging workup    Thank you for your consult.  Please call with questions    Frederick Larose MD  General Surgery  [facility]

## 2021-12-30 NOTE — CARE UPDATE
12/29/21 2008   Patient Assessment/Suction   Level of Consciousness (AVPU) alert   Respiratory Effort Unlabored   Expansion/Accessory Muscles/Retractions no use of accessory muscles   All Lung Fields Breath Sounds diminished   Rhythm/Pattern, Respiratory unlabored   Cough Type nonproductive   PRE-TX-O2   O2 Device (Oxygen Therapy) nasal cannula   $ Is the patient on Low Flow Oxygen? Yes   Flow (L/min) 2.5   SpO2 95 %   Pulse Oximetry Type Intermittent   $ Pulse Oximetry - Single Charge Pulse Oximetry - Single   Pulse 98   Resp 18   Aerosol Therapy   $ Aerosol Therapy Charges Aerosol Treatment   Respiratory Treatment Status (SVN) given   Treatment Route (SVN) mask   Patient Position (SVN) semi-Pardo's   Post Treatment Assessment (SVN) breath sounds unchanged   Signs of Intolerance (SVN) none   Breath Sounds Post-Respiratory Treatment   Throughout All Fields Post-Treatment All Fields   Throughout All Fields Post-Treatment no change   Post-treatment Heart Rate (beats/min) 97   Post-treatment Resp Rate (breaths/min) 18  (18)

## 2021-12-30 NOTE — PROGRESS NOTES
Radiology Post-Procedure Note    Pre Op Diagnosis: pleural effusion, for right thoracentesis  Post Op Diagnosis: Same    Procedure: ultrasound guided right thoracentesis    Procedure performed by: Dr. Karen Ross    Written Informed Consent Obtained: Yes  Specimen Removed:  cc yellow pleural fluid, specimen sent to lab as ordered  Estimated Blood Loss: Minimal    Findings:   Successful right thoracentesis with patient tolerating the procedure well and no immediate complication. Patient returned to the floor. Post thoracentesis CXR showed no pneumothorax, dictated separately    Patient tolerated procedure well.    @SIG@

## 2021-12-30 NOTE — PLAN OF CARE
Pt has had some nausea/vomiting over night.  Prn meds given with relief noted.  No bloody vomit or stool noted.  Remains on cardizem drip at 10 mg/hr with heart rate 100-110.  Stout patent and draining clear yellow urine.  Assisted with turning q 2 hours.  Will monitor.

## 2021-12-30 NOTE — PROGRESS NOTES
Patient seen and examined.  Endorses nausea.  She had a staging CT scan overnight that was read as possible bowel obstruction.  Patient denies abdominal distension.    Abdomen is soft, minimal tenderness    CT reviewed:  No clear evidence of metastatic colon disease.  Small hypoechoic lesion on the liver which appears simple.  Proximal dilated small bowel with relative decompression distally.    76-year-old female admitted with respiratory failure who underwent colonoscopy for rectal bleeding.  She likely has colon cancer based off of endoscopy.  She underwent staging CT scan which was concerning for possible bowel obstruction.  Patient is nauseated but denies recent emesis.    Overall patient is not leaning towards operative intervention even if this is colon cancer given her age and comorbid medical conditions.  Total abdominal colectomy with end ileostomy would be high risk for postoperative dehydration from high fluid losses.  Unclear if this is a true bowel obstruction, I feel this is less likely given recent tolerance of prep.  Okay for sips and chips of clears  Follow-up KUB ordered to assess for contrast transit  Should patient have persistent nausea or emesis, would recommend NG decompression

## 2021-12-30 NOTE — PROGRESS NOTES
[facility] Note    CC: Colon mass    HPI 76 y.o. female with a past medical history of COPD chronic respiratory failure lymphoma cancer in her chart who presents the emergency room as referral from nursing home for bilateral pneumonia.  Patient was 79% on room air for EMS.  He she was in respiratory distress and they gave 1 DuoNeb, 1 regular albuterol, Solu-Medrol 125. Upon arrival patient is altered and confused and respiratory distress.  Non-rebreather has her oxygen level 98%.  History is limited otherwise secondary to respiratory distress        FURTHER HISTORY:  Above obtained from independent review of records from admitting provider as well as from direct discussion with nursing at bedside who states that patient had one episode of BRBPR today which she witnessed.  In addition, on my interview, I note the following:  Patient on BIPAP for NIPPV for pneumonia.  She had rectal bleeding, recent onset, mild to moderate amount, bright red, painless, with no alleviating/exacerbating factors.  She denies abdominal pain or emesis.  She had EGD recently with Dr. Hoover with erosive gastritis noted.  She states that she has never had colonoscopy.  No other acute GI issues.  She had chronic stable anemia on labs.      INTERVAL HISTORY:  No further bleeding per nursing.  Reviewed CT results and notes from surgery.  Patient currently comfortable.  Expressed that she is unsure if she would be willing to pursue surgical treatment for colon cancer.      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    Respiratory failure, chronic          Review of Systems  General ROS: negative for - chills, fever or weight loss  Cardiovascular ROS: no chest pain or dyspnea on exertion  Gastrointestinal ROS: no pain    Physical Examination  /62 (BP Location: Left arm, Patient Position: Sitting)   Pulse 94    "Temp 98.1 °F (36.7 °C) (Oral)   Resp 15   Ht 5' 3" (1.6 m)   Wt 47.9 kg (105 lb 9.6 oz)   SpO2 100%   Breastfeeding No   BMI 18.71 kg/m²   General appearance: alert, cooperative, no distress  HENT: Normocephalic, atraumatic, neck symmetrical, no nasal discharge, sclera anicteric   Lungs: clear to auscultation bilaterally, symmetric chest wall expansion bilaterally  Heart: regular rate and rhythm without rub; no displacement of the PMI   Abdomen: soft, NT  Extremities: extremities symmetric; no clubbing, cyanosis, or edema  Neurologic: Alert and oriented X 3, no sensory or motor neurologic deficits      Labs:  Lab Results   Component Value Date    WBC 10.73 12/30/2021    HGB 8.6 (L) 12/30/2021    HCT 29.3 (L) 12/30/2021    MCV 94 12/30/2021     12/30/2021             Imaging:  CT scan was independently visualized and reviewed by me and showed questionable SBO with no clinical evidence of this on exam.      Assessment:   1.  Bilateral pneumonia vs CHF  2.  Rectal bleeding  3.  BIPAP ongoing  4.  Chronic stable anemia  5.  Erosive gastritis on recent EGD     Plan:  1.  Follow up biopsies  2.  Continue current management per primary team  3.  Diet as tolerated  4.  Further management per surgery and oncology  5.  GI to sign off for now.  Call for questions.    Ghassan Ambrocio MD  [facility]  [address], [address]  [place] [address]  Office: [phone]  Fax: [phone]    "

## 2021-12-31 PROBLEM — E87.6 HYPOKALEMIA: Status: RESOLVED | Noted: 2021-12-30 | Resolved: 2021-12-31

## 2021-12-31 PROBLEM — E87.5 HYPERKALEMIA: Status: ACTIVE | Noted: 2021-12-31

## 2021-12-31 PROBLEM — E43 SEVERE MALNUTRITION: Status: ACTIVE | Noted: 2021-12-31

## 2021-12-31 LAB
ALBUMIN SERPL BCP-MCNC: 2.6 G/DL (ref 3.5–5.2)
ALP SERPL-CCNC: 54 U/L (ref 55–135)
ALT SERPL W/O P-5'-P-CCNC: 12 U/L (ref 10–44)
ANION GAP SERPL CALC-SCNC: 15 MMOL/L (ref 8–16)
AST SERPL-CCNC: 13 U/L (ref 10–40)
BACTERIA BLD CULT: ABNORMAL
BASOPHILS # BLD AUTO: 0.02 K/UL (ref 0–0.2)
BASOPHILS NFR BLD: 0.2 % (ref 0–1.9)
BILIRUB SERPL-MCNC: 0.6 MG/DL (ref 0.1–1)
BUN SERPL-MCNC: 13 MG/DL (ref 8–23)
CALCIUM SERPL-MCNC: 9.4 MG/DL (ref 8.7–10.5)
CHLORIDE SERPL-SCNC: 87 MMOL/L (ref 95–110)
CO2 SERPL-SCNC: 36 MMOL/L (ref 23–29)
CREAT SERPL-MCNC: 1.6 MG/DL (ref 0.5–1.4)
DIFFERENTIAL METHOD: ABNORMAL
EOSINOPHIL # BLD AUTO: 0.3 K/UL (ref 0–0.5)
EOSINOPHIL NFR BLD: 3 % (ref 0–8)
ERYTHROCYTE [DISTWIDTH] IN BLOOD BY AUTOMATED COUNT: 14.8 % (ref 11.5–14.5)
EST. GFR  (AFRICAN AMERICAN): 36 ML/MIN/1.73 M^2
EST. GFR  (NON AFRICAN AMERICAN): 31 ML/MIN/1.73 M^2
GLUCOSE SERPL-MCNC: 85 MG/DL (ref 70–110)
HCT VFR BLD AUTO: 28.9 % (ref 37–48.5)
HGB BLD-MCNC: 8.4 G/DL (ref 12–16)
IMM GRANULOCYTES # BLD AUTO: 0.06 K/UL (ref 0–0.04)
IMM GRANULOCYTES NFR BLD AUTO: 0.7 % (ref 0–0.5)
LYMPHOCYTES # BLD AUTO: 1.6 K/UL (ref 1–4.8)
LYMPHOCYTES NFR BLD: 18.8 % (ref 18–48)
MAGNESIUM SERPL-MCNC: 2 MG/DL (ref 1.6–2.6)
MCH RBC QN AUTO: 27.1 PG (ref 27–31)
MCHC RBC AUTO-ENTMCNC: 29.1 G/DL (ref 32–36)
MCV RBC AUTO: 93 FL (ref 82–98)
MONOCYTES # BLD AUTO: 0.5 K/UL (ref 0.3–1)
MONOCYTES NFR BLD: 5.9 % (ref 4–15)
NEUTROPHILS # BLD AUTO: 6.2 K/UL (ref 1.8–7.7)
NEUTROPHILS NFR BLD: 71.4 % (ref 38–73)
NRBC BLD-RTO: 0 /100 WBC
PHOSPHATE SERPL-MCNC: 4.7 MG/DL (ref 2.7–4.5)
PLATELET # BLD AUTO: 360 K/UL (ref 150–450)
PMV BLD AUTO: 9.3 FL (ref 9.2–12.9)
POTASSIUM SERPL-SCNC: 3.3 MMOL/L (ref 3.5–5.1)
POTASSIUM SERPL-SCNC: 5.4 MMOL/L (ref 3.5–5.1)
PROT SERPL-MCNC: 5.9 G/DL (ref 6–8.4)
RBC # BLD AUTO: 3.1 M/UL (ref 4–5.4)
SODIUM SERPL-SCNC: 138 MMOL/L (ref 136–145)
WBC # BLD AUTO: 8.65 K/UL (ref 3.9–12.7)

## 2021-12-31 PROCEDURE — 84100 ASSAY OF PHOSPHORUS: CPT | Performed by: STUDENT IN AN ORGANIZED HEALTH CARE EDUCATION/TRAINING PROGRAM

## 2021-12-31 PROCEDURE — 25000003 PHARM REV CODE 250: Performed by: INTERNAL MEDICINE

## 2021-12-31 PROCEDURE — C9113 INJ PANTOPRAZOLE SODIUM, VIA: HCPCS | Performed by: STUDENT IN AN ORGANIZED HEALTH CARE EDUCATION/TRAINING PROGRAM

## 2021-12-31 PROCEDURE — 94761 N-INVAS EAR/PLS OXIMETRY MLT: CPT

## 2021-12-31 PROCEDURE — 83735 ASSAY OF MAGNESIUM: CPT | Performed by: STUDENT IN AN ORGANIZED HEALTH CARE EDUCATION/TRAINING PROGRAM

## 2021-12-31 PROCEDURE — 25000003 PHARM REV CODE 250: Performed by: STUDENT IN AN ORGANIZED HEALTH CARE EDUCATION/TRAINING PROGRAM

## 2021-12-31 PROCEDURE — 36415 COLL VENOUS BLD VENIPUNCTURE: CPT | Performed by: HOSPITALIST

## 2021-12-31 PROCEDURE — 85025 COMPLETE CBC W/AUTO DIFF WBC: CPT | Performed by: STUDENT IN AN ORGANIZED HEALTH CARE EDUCATION/TRAINING PROGRAM

## 2021-12-31 PROCEDURE — 27000221 HC OXYGEN, UP TO 24 HOURS

## 2021-12-31 PROCEDURE — 94640 AIRWAY INHALATION TREATMENT: CPT

## 2021-12-31 PROCEDURE — 63600175 PHARM REV CODE 636 W HCPCS: Performed by: STUDENT IN AN ORGANIZED HEALTH CARE EDUCATION/TRAINING PROGRAM

## 2021-12-31 PROCEDURE — 63600175 PHARM REV CODE 636 W HCPCS: Performed by: INTERNAL MEDICINE

## 2021-12-31 PROCEDURE — 36415 COLL VENOUS BLD VENIPUNCTURE: CPT | Performed by: STUDENT IN AN ORGANIZED HEALTH CARE EDUCATION/TRAINING PROGRAM

## 2021-12-31 PROCEDURE — 25000003 PHARM REV CODE 250: Performed by: HOSPITALIST

## 2021-12-31 PROCEDURE — 25000242 PHARM REV CODE 250 ALT 637 W/ HCPCS: Performed by: STUDENT IN AN ORGANIZED HEALTH CARE EDUCATION/TRAINING PROGRAM

## 2021-12-31 PROCEDURE — 11000001 HC ACUTE MED/SURG PRIVATE ROOM

## 2021-12-31 PROCEDURE — 97161 PT EVAL LOW COMPLEX 20 MIN: CPT

## 2021-12-31 PROCEDURE — 84132 ASSAY OF SERUM POTASSIUM: CPT | Performed by: HOSPITALIST

## 2021-12-31 PROCEDURE — 80053 COMPREHEN METABOLIC PANEL: CPT | Performed by: STUDENT IN AN ORGANIZED HEALTH CARE EDUCATION/TRAINING PROGRAM

## 2021-12-31 RX ORDER — POTASSIUM CHLORIDE 7.45 MG/ML
10 INJECTION INTRAVENOUS
Status: DISCONTINUED | OUTPATIENT
Start: 2021-12-31 | End: 2021-12-31

## 2021-12-31 RX ORDER — DILTIAZEM HYDROCHLORIDE 60 MG/1
60 TABLET, FILM COATED ORAL EVERY 8 HOURS
Status: DISCONTINUED | OUTPATIENT
Start: 2021-12-31 | End: 2022-01-01

## 2021-12-31 RX ADMIN — IPRATROPIUM BROMIDE AND ALBUTEROL SULFATE 3 ML: 2.5; .5 SOLUTION RESPIRATORY (INHALATION) at 12:12

## 2021-12-31 RX ADMIN — POTASSIUM BICARBONATE 35 MEQ: 391 TABLET, EFFERVESCENT ORAL at 10:12

## 2021-12-31 RX ADMIN — MUPIROCIN: 20 OINTMENT TOPICAL at 10:12

## 2021-12-31 RX ADMIN — FUROSEMIDE 40 MG: 10 INJECTION, SOLUTION INTRAMUSCULAR; INTRAVENOUS at 10:12

## 2021-12-31 RX ADMIN — POTASSIUM & SODIUM PHOSPHATES POWDER PACK 280-160-250 MG 1 PACKET: 280-160-250 PACK at 09:12

## 2021-12-31 RX ADMIN — MIDODRINE HYDROCHLORIDE 5 MG: 2.5 TABLET ORAL at 09:12

## 2021-12-31 RX ADMIN — PANTOPRAZOLE SODIUM 40 MG: 40 INJECTION, POWDER, LYOPHILIZED, FOR SOLUTION INTRAVENOUS at 09:12

## 2021-12-31 RX ADMIN — THERA TABS 1 TABLET: TAB at 09:12

## 2021-12-31 RX ADMIN — DILTIAZEM HYDROCHLORIDE 60 MG: 60 TABLET, FILM COATED ORAL at 09:12

## 2021-12-31 RX ADMIN — POLYETHYLENE GLYCOL 3350 17 G: 17 POWDER, FOR SOLUTION ORAL at 09:12

## 2021-12-31 RX ADMIN — DIGOXIN 125 MCG: 250 INJECTION, SOLUTION INTRAMUSCULAR; INTRAVENOUS at 09:12

## 2021-12-31 RX ADMIN — MUPIROCIN: 20 OINTMENT TOPICAL at 09:12

## 2021-12-31 RX ADMIN — PIPERACILLIN AND TAZOBACTAM 4.5 G: 4; .5 INJECTION, POWDER, LYOPHILIZED, FOR SOLUTION INTRAVENOUS; PARENTERAL at 10:12

## 2021-12-31 RX ADMIN — IPRATROPIUM BROMIDE AND ALBUTEROL SULFATE 3 ML: 2.5; .5 SOLUTION RESPIRATORY (INHALATION) at 07:12

## 2021-12-31 RX ADMIN — POTASSIUM & SODIUM PHOSPHATES POWDER PACK 280-160-250 MG 1 PACKET: 280-160-250 PACK at 12:12

## 2021-12-31 RX ADMIN — POTASSIUM BICARBONATE 35 MEQ: 391 TABLET, EFFERVESCENT ORAL at 01:12

## 2021-12-31 RX ADMIN — DILTIAZEM HYDROCHLORIDE 60 MG: 60 TABLET, FILM COATED ORAL at 01:12

## 2021-12-31 RX ADMIN — FLUTICASONE FUROATE AND VILANTEROL TRIFENATATE 1 PUFF: 100; 25 POWDER RESPIRATORY (INHALATION) at 07:12

## 2021-12-31 RX ADMIN — LIDOCAINE 5% 1 PATCH: 700 PATCH TOPICAL at 04:12

## 2021-12-31 RX ADMIN — MIRTAZAPINE 15 MG: 15 TABLET, FILM COATED ORAL at 09:12

## 2021-12-31 RX ADMIN — Medication 1 EACH: at 09:12

## 2021-12-31 RX ADMIN — Medication 1000 UNITS: at 09:12

## 2021-12-31 RX ADMIN — CYANOCOBALAMIN TAB 1000 MCG 1000 MCG: 1000 TAB at 09:12

## 2021-12-31 RX ADMIN — MIDODRINE HYDROCHLORIDE 5 MG: 2.5 TABLET ORAL at 04:12

## 2021-12-31 RX ADMIN — POTASSIUM & SODIUM PHOSPHATES POWDER PACK 280-160-250 MG 1 PACKET: 280-160-250 PACK at 05:12

## 2021-12-31 RX ADMIN — DILTIAZEM HYDROCHLORIDE 5 MG/HR: 5 INJECTION INTRAVENOUS at 01:12

## 2021-12-31 NOTE — PROGRESS NOTES
Having flatus and bm  Notice some blood in recent bm    Vitals reviewed    Lab Results   Component Value Date    WBC 8.65 12/31/2021    HGB 8.4 (L) 12/31/2021    HCT 28.9 (L) 12/31/2021    MCV 93 12/31/2021     12/31/2021         Ap  Dilated sb on ct no clinical signs of obstruction   Diet as tolerated  Patient not interested in any surgical intervention  Will sign off

## 2021-12-31 NOTE — PROGRESS NOTES
[facility]  Cardiology  Progress Note    Patient Name: Patirzia Valadez  MRN: [id number]  Admission Date: 12/26/2021  Hospital Length of Stay: 5 days  Code Status: DNR   Attending Physician: Reyes Olmstead MD   Primary Care Physician: Anya Farr NP  Expected Discharge Date: 1/3/2022  Principal Problem:Acute on chronic diastolic congestive heart failure    Subjective:     Hospital Course:  The patient remains stable and is tolerating clear liquids.  She remains on IV Cardizem to control her atrial fib.  She is not interested in having an operation.  She does have a daughter and son that she is close to and I will be interested to see how much influenced they have on her decision.        ROS  Objective:     Vital Signs (Most Recent):  Temp: 97.4 °F (36.3 °C) (12/31/21 0852)  Pulse: 109 (12/31/21 0852)  Resp: 20 (12/31/21 0852)  BP: 121/66 (12/31/21 0852)  SpO2: 96 % (12/31/21 0852) Vital Signs (24h Range):  Temp:  [96.6 °F (35.9 °C)-98.6 °F (37 °C)] 97.4 °F (36.3 °C)  Pulse:  [] 109  Resp:  [15-20] 20  SpO2:  [90 %-100 %] 96 %  BP: (102-137)/(56-66) 121/66     Weight: 47.9 kg (105 lb 9.6 oz)  Body mass index is 18.71 kg/m².    SpO2: 96 %  O2 Device (Oxygen Therapy): nasal cannula      Intake/Output Summary (Last 24 hours) at 12/31/2021 1152  Last data filed at 12/31/2021 0400  Gross per 24 hour   Intake 640.84 ml   Output 1350 ml   Net -709.16 ml       Lines/Drains/Airways     Drain                 Urethral Catheter 12/26/21 2050 16 Fr. 4 days          Peripheral Intravenous Line                 Peripheral IV - Single Lumen 12/26/21 20 G Right Forearm 5 days         Peripheral IV - Single Lumen 12/29/21 0900 20 G Anterior;Distal;Right Forearm 2 days                Physical Exam the patient is resting in bed in no distress.  Lungs are clear  Cardiac is irregular without murmur or gallop  Abdomen is soft and nontender  Extremities without edema    Significant Labs:   CMP   Recent Labs    Lab 12/29/21  1541 12/30/21  0629 12/31/21  0452    139 138   K 4.2 3.9 3.3*   CL 94* 91* 87*   CO2 34* 35* 36*   GLU 96 107 85   BUN 16 13 13   CREATININE 1.4 1.3 1.6*   CALCIUM 9.1 9.4 9.4   PROT  --  6.2 5.9*   ALBUMIN  --  2.8* 2.6*   BILITOT  --  0.5 0.6   ALKPHOS  --  61 54*   AST  --  13 13   ALT  --  12 12   ANIONGAP 15 13 15   ESTGFRAFRICA 42* 46* 36*   EGFRNONAA 37* 40* 31*    and CBC   Recent Labs   Lab 12/30/21  0629 12/30/21  0629 12/31/21  0452   WBC 10.73  --  8.65   HGB 8.6*  --  8.4*   HCT 29.3*   < > 28.9*     --  360    < > = values in this interval not displayed.       Significant Imaging:  Assessment and Plan:  Chronic atrial fibrillation in the setting of severe COPD.  The rate can be very difficult to control in this situation.  We will transition from IV Cardizem to p.o..  Continue digoxin.  A very low dose of metoprolol may be needed if Cardizem does not control the heart rate.  Presently the patient does not want colon surgery.  This is reasonable considering her age and comorbidities.  If she becomes obstructed a diverting ileostomy would be a much less invasive procedures than colectomy.       Active Diagnoses:    Diagnosis Date Noted POA    PRINCIPAL PROBLEM:  Acute on chronic diastolic congestive heart failure [I50.33] 10/14/2021 Yes    Hypokalemia [E87.6] 12/30/2021 Yes    JOSE (acute kidney injury) [N17.9] 12/29/2021 No    Colon cancer [C18.9]  Yes    Anemia due to chronic blood loss [D50.0] 10/22/2021 Yes    Chronic hypotension on midodrine [I95.9] 10/22/2021 Yes     Chronic    Paroxysmal atrial fibrillation [I48.0] 10/12/2021 Yes    Acute on chronic respiratory failure with hypoxia and hypercapnia [J96.21, J96.22] 08/03/2021 Yes    COPD (chronic obstructive pulmonary disease) [J44.9] 08/03/2021 Yes     Chronic      Problems Resolved During this Admission:       VTE Risk Mitigation (From admission, onward)         Ordered     Place sequential compression device   Until discontinued         12/27/21 0302                Adonis Mcmullen MD  Cardiology  [facility]

## 2021-12-31 NOTE — ASSESSMENT & PLAN NOTE
Improved.  Monitor renal labs.  Monitor UOP.    Lab Results   Component Value Date    CREATININE 1.3 12/30/2021

## 2021-12-31 NOTE — PLAN OF CARE
Problem: Adult Inpatient Plan of Care  Goal: Plan of Care Review  Outcome: Ongoing, Progressing      Problem: Adult Inpatient Plan of Care  Goal: Optimal Comfort and Wellbeing  Outcome: Ongoing, Progressing     Problem: Respiratory Compromise (Pneumonia)  Goal: Effective Oxygenation and Ventilation  Outcome: Ongoing, Progressing     Problem: Fall Injury Risk  Goal: Absence of Fall and Fall-Related Injury  Outcome: Ongoing, Progressing

## 2021-12-31 NOTE — PT/OT/SLP EVAL
Physical Therapy Evaluation    Patient Name:  Patrizia Valadez   MRN:  [id number]    Recommendations:     Discharge Recommendations:  nursing facility, basic,nursing facility, skilled   Discharge Equipment Recommendations: none   Barriers to discharge: None    Assessment:     Patrizia Valadez is a 76 y.o. female admitted with a medical diagnosis of Acute on chronic diastolic congestive heart failure.  She presents with the following impairments/functional limitations:  weakness,impaired endurance,impaired functional mobilty,gait instability,impaired balance,decreased lower extremity function,decreased ROM,impaired cardiopulmonary response to activity,pain .  Patient agreeable to PT evaluation this morning but reported getting very dizzy sitting EOB so insisted on laying back down after just a few minutes.  Patient presented supine in bed and required mod assist to transfer to sitting but then was able to sit unsupported.     Rehab Prognosis: Fair; patient would benefit from acute skilled PT services to address these deficits and reach maximum level of function.    Recent Surgery: Procedure(s) (LRB):  COLONOSCOPY (N/A) 2 Days Post-Op    Plan:     During this hospitalization, patient to be seen 5 x/week to address the identified rehab impairments via gait training,therapeutic activities,therapeutic exercises and progress toward the following goals:    · Plan of Care Expires:  01/28/22    Subjective     Chief Complaint: pain and feeling dizzy  Patient/Family Comments/goals: none given  Pain/Comfort:  · Pain Rating 1: 4/10  · Location - Side 1: Bilateral  · Location - Orientation 1: anterior  · Location 1: abdomen  · Pain Addressed 1: Reposition,Cessation of Activity  · Pain Rating Post-Intervention 1: 5/10    Patients cultural, spiritual, Scientologist conflicts given the current situation:      Living Environment:  Currently lives in nursing home.  Prior to admission, patients level of function was min assist.  Equipment used at  home: walker, rolling,oxygen.  DME owned (not currently used): none.  Upon discharge, patient will have assistance from NH staff.    Objective:     Communicated with nurse prior to session.  Patient found supine with bed alarm,peripheral IV,telemetry  upon PT entry to room.    General Precautions: Standard, fall   Orthopedic Precautions:N/A   Braces:    Respiratory Status: Nasal cannula, flow 2 L/min    Exams:  · RLE ROM: Deficits: knee extension -15 degrees taken in sittting  · RLE Strength: Deficits: 3/5 overall  · LLE ROM: WFL  · LLE Strength: Deficits: 3+/5 overall    Functional Mobility:  · Bed Mobility:     · Supine to Sit: moderate assistance  · Sit to Supine: moderate assistance  · Balance: sitting EOB x 4 minutes with SBA    Therapeutic Activities and Exercises:   none given    AM-PAC 6 CLICK MOBILITY  Total Score:10     Patient left supine with call button in reach, bed alarm on and nurse present.    GOALS:   Multidisciplinary Problems     Physical Therapy Goals        Problem: Physical Therapy Goal    Goal Priority Disciplines Outcome Goal Variances Interventions   Physical Therapy Goal     PT, PT/OT Ongoing, Progressing     Description: Goals to be met by: 22    Patient will increase functional independence with mobility by performin. Supine to sit with MInimal Assistance  2. Sit to supine with MInimal Assistance  3. Sit to stand transfer with Minimal Assistance  4. Bed to chair transfer with Minimal Assistance using Rolling Walker                     History:     Past Medical History:   Diagnosis Date    Cancer     COPD (chronic obstructive pulmonary disease)     Hx of blood clots     Lymphoma     Large B-Cell    Respiratory failure, chronic        Past Surgical History:   Procedure Laterality Date    CHOLECYSTECTOMY      COLONOSCOPY N/A 2021    Procedure: COLONOSCOPY;  Surgeon: Ghassan Young MD;  Location[facility];  Service: Endoscopy;  Laterality: N/A;     ESOPHAGOGASTRODUODENOSCOPY N/A 10/23/2021    Procedure: EGD (ESOPHAGOGASTRODUODENOSCOPY);  Surgeon: Jaja Hoover MD;  Location: [facility];  Service: Endoscopy;  Laterality: N/A;    HYSTERECTOMY      NO PAST SURGERIES         Time Tracking:     PT Received On: 12/31/21  PT Start Time: 0946     PT Stop Time: 0957  PT Total Time (min): 11 min     Billable Minutes: Evaluation 11      12/31/2021

## 2021-12-31 NOTE — PLAN OF CARE
Intervention: general helathful diet and nutrition supplement therapy     Recommendation:  1) Change diet to low sodium, renal diet + Nepro BID   2) Weigh daily or as needed   3) If pt does not tolerate diet advancement consider supplemental nutrition support    Goals: 1) PO intakes > 50% of meals and supplements at f/u  Nutrition Goal Status: new  Communication of RD Recs: reviewed with physician (POC, sticky note, second sign)

## 2021-12-31 NOTE — SUBJECTIVE & OBJECTIVE
Interval History:  No further bleeding from lower GI tract.  Anemia is controlled.  HGB higher today than yesterday's.  Less short of breath.  Continues with rapid atrial fibrillation.    Review of Systems   Constitutional: Positive for fatigue. Negative for chills and fever.   Respiratory: Positive for shortness of breath. Negative for cough.    Cardiovascular: Negative for chest pain and leg swelling.   Gastrointestinal: Negative for abdominal pain, nausea and vomiting.     Objective:     Vital Signs (Most Recent):  Temp: 97.3 °F (36.3 °C) (12/30/21 1915)  Pulse: (!) 155 (12/30/21 1915)  Resp: 20 (12/30/21 1915)  BP: (!) 123/58 (12/30/21 1915)  SpO2: 97 % (12/30/21 1915) Vital Signs (24h Range):  Temp:  [96.7 °F (35.9 °C)-98.6 °F (37 °C)] 97.3 °F (36.3 °C)  Pulse:  [] 155  Resp:  [15-20] 20  SpO2:  [92 %-100 %] 97 %  BP: (102-149)/(57-72) 123/58     Weight: 47.9 kg (105 lb 9.6 oz)  Body mass index is 18.71 kg/m².    Intake/Output Summary (Last 24 hours) at 12/30/2021 2026  Last data filed at 12/30/2021 1845  Gross per 24 hour   Intake 1747.09 ml   Output 1650 ml   Net 97.09 ml      Physical Exam  Vitals reviewed.   Constitutional:       General: She is not in acute distress.     Comments: Frail   HENT:      Mouth/Throat:      Mouth: Oropharynx is clear and moist.   Eyes:      General:         Right eye: No discharge.         Left eye: No discharge.   Neck:      Vascular: No JVD.   Cardiovascular:      Rate and Rhythm: Tachycardia present. Rhythm regularly irregular.   Pulmonary:      Effort: Pulmonary effort is normal.      Breath sounds: Normal breath sounds. Decreased air movement present.   Abdominal:      General: Bowel sounds are normal. There is no distension.      Palpations: Abdomen is soft.      Tenderness: There is no abdominal tenderness.   Musculoskeletal:         General: No edema.   Skin:     General: Skin is warm.      Coloration: Skin is pale.      Findings: No rash.   Neurological:       Mental Status: She is alert.         Significant Labs: All pertinent labs within the past 24 hours have been reviewed.    Significant Imaging: I have reviewed all pertinent imaging results/findings within the past 24 hours.

## 2021-12-31 NOTE — PROGRESS NOTES
[facility] Hospital Medicine  Progress Note    Patient Name: Patrizia Valadez  MRN: [id number]  Patient Class: IP- Inpatient   Admission Date: 12/26/2021  Length of Stay: 4 days  Attending Physician: Reyes Olmstead MD  Primary Care Provider: Anya Farr NP        Subjective:     Principal Problem:Acute on chronic diastolic congestive heart failure        HPI:  Patrizia Valadez is a 75-year-old female with a past medical history of COPD, chronic respiratory failure on oxygen at home, lymphoma, and past surgical history of cholecystectomy who presented to the ED with a complaint of shortness of breath. HPI is limited due to acuity of condition. She was sent from a nursing some for decreased mental status as well as low oxygen saturation and respiratory distress. Per ED notes, EMS found the patient to have a pulse ox of 79% on arrival. She received a duoneb treatment, albuterol treatment and solumedrol 125 mg prior to arrival.     Upon arrival to the ED, the patient was found to be altered and confused, and in respiratory distress. She was placed on a non re breather mask, which improved her oxygenation to 98% but she did require non invasive ventilation. She was placed on BiPAP at 50%, 14/6, which has improved her respiratory status. She is more alert and awake, and is able to answer simple questions appropriately. Chest xray revealed bilateral pneumonia along with chronic findings. CBC revealed leukocytosis of 12.87 and what appears to be chronic anemia. Initial lactic acid level was elevated at 2.4, but has decreased to 1.5. Troponin negative, urine negative. BNP elevated at 1540. She was given one dose of Lasix 40 mg IV. Due to continued use of BiPAP and high acuity, she will be admitted to the ICU. The patient does state she is a DNR, review of medical record does show past recent admissions as a DNR. She does not have paper work with her today. Hospital Medicine consulted for admission and  further management.          Overview/Hospital Course:  Patrizia Valadez is a 75 year old female with a past medical history of COPD, chronic respiratory failure on oxygen at home, lymphoma, HANNAH, Afib, and CHF who presented to the ED with a complaint of shortness of breath. She was found to have an acute on chronic CHF exacerbation and is being treated with IV Lasix. She has required BiPAP therapy and was admitted to the ICU. She was placed empiric Zosyn as well for a possible superimposed pneumonia (WBC and procalcitonin elevated.) Pulmonary and Cardiology have also been consulted. Her course was complicated by Afib for which the patient is on a diltiazem infusion. Anticoagulation with Eliquis was held given a witnessed bloody bowel movement. GI has been consulted and will perform colonoscopy 12/29. She was diuresed roughly 2.2 L and she was able to be weaned off BiPAP to 2 L NC. Her course was complicated by JOSE likely secondary to over-diuresis. Lasix has held and a 500 cc LR bolus was ordered. PT/OT was consulted and the patient was transferred out of the ICU 12/29.      Interval History:  No further bleeding from lower GI tract.  Anemia is controlled.  HGB higher today than yesterday's.  Less short of breath.  Continues with rapid atrial fibrillation.    Review of Systems   Constitutional: Positive for fatigue. Negative for chills and fever.   Respiratory: Positive for shortness of breath. Negative for cough.    Cardiovascular: Negative for chest pain and leg swelling.   Gastrointestinal: Negative for abdominal pain, nausea and vomiting.     Objective:     Vital Signs (Most Recent):  Temp: 97.3 °F (36.3 °C) (12/30/21 1915)  Pulse: (!) 155 (12/30/21 1915)  Resp: 20 (12/30/21 1915)  BP: (!) 123/58 (12/30/21 1915)  SpO2: 97 % (12/30/21 1915) Vital Signs (24h Range):  Temp:  [96.7 °F (35.9 °C)-98.6 °F (37 °C)] 97.3 °F (36.3 °C)  Pulse:  [] 155  Resp:  [15-20] 20  SpO2:  [92 %-100 %] 97 %  BP: (102-149)/(57-72)  123/58     Weight: 47.9 kg (105 lb 9.6 oz)  Body mass index is 18.71 kg/m².    Intake/Output Summary (Last 24 hours) at 12/30/2021 2026  Last data filed at 12/30/2021 1845  Gross per 24 hour   Intake 1747.09 ml   Output 1650 ml   Net 97.09 ml      Physical Exam  Vitals reviewed.   Constitutional:       General: She is not in acute distress.     Comments: Frail   HENT:      Mouth/Throat:      Mouth: Oropharynx is clear and moist.   Eyes:      General:         Right eye: No discharge.         Left eye: No discharge.   Neck:      Vascular: No JVD.   Cardiovascular:      Rate and Rhythm: Tachycardia present. Rhythm regularly irregular.   Pulmonary:      Effort: Pulmonary effort is normal.      Breath sounds: Normal breath sounds. Decreased air movement present.   Abdominal:      General: Bowel sounds are normal. There is no distension.      Palpations: Abdomen is soft.      Tenderness: There is no abdominal tenderness.   Musculoskeletal:         General: No edema.   Skin:     General: Skin is warm.      Coloration: Skin is pale.      Findings: No rash.   Neurological:      Mental Status: She is alert.         Significant Labs: All pertinent labs within the past 24 hours have been reviewed.    Significant Imaging: I have reviewed all pertinent imaging results/findings within the past 24 hours.      Assessment/Plan:      * Acute on chronic diastolic congestive heart failure  Improved.  Continue IV loop diuretic.  BP too low for beta blocker or ACE inhibition.  Monitor volume status.  Follow cardiology's recommendations.    Hypokalemia  Give supplement and monitor level.  Controlled.    Potassium   Date Value Ref Range Status   12/30/2021 3.9 3.5 - 5.1 mmol/L Final         JOSE (acute kidney injury)  Improved.  Monitor renal labs.  Monitor UOP.    Lab Results   Component Value Date    CREATININE 1.3 12/30/2021         Colon cancer  Diagnosed during current admission via endoscopic exam.  Biopsies obtained.  Monitor for  bleeding.  Patient chooses not to have treatment for it, neither surgical nor medical.  Will consult palliative care service for recommendations.    Chronic hypotension on midodrine  Controlled.  Monitor BP.  Continue midodrine.    Anemia due to chronic blood loss  Probably anemia of chronic blood loss.  Will monitor HGB.  Stable at the moment.    Lab Results   Component Value Date    HGB 8.6 (L) 12/30/2021         Paroxysmal atrial fibrillation  Uncontrolled.  Continue diltiazem infusion.  Monitor heart rate.  Keep K within good range.  Follow cardiology's recommendations.    Multifocal atrial tachycardia        Acute on chronic respiratory failure with hypoxia and hypercapnia  Treat underlying causes, which is CHF exacerbation.  Supplement oxygen.  Monitor oximetry readings and ABG's as needed.  Follow pulmonology's recommendations.    COPD (chronic obstructive pulmonary disease)  Stable.  Continue bronchodilator treatments.  Follow pulmonology's recommendations.      VTE Risk Mitigation (From admission, onward)         Ordered     Place sequential compression device  Until discontinued         12/27/21 0302                Discharge Planning   VAZQUEZ: 1/3/2022     Code Status: DNR   Is the patient medically ready for discharge?:     Reason for patient still in hospital (select all that apply): Patient trending condition, Laboratory test and Treatment  Discharge Plan A: Return to nursing home                  Reyes Olmstead MD  Department of Hospital Medicine   [facility]

## 2021-12-31 NOTE — ASSESSMENT & PLAN NOTE
Uncontrolled.  Continue diltiazem infusion.  Monitor heart rate.  Keep K within good range.  Follow cardiology's recommendations.

## 2021-12-31 NOTE — ASSESSMENT & PLAN NOTE
Improved.  Continue IV loop diuretic.  BP too low for beta blocker or ACE inhibition.  Monitor volume status.  Follow cardiology's recommendations.

## 2021-12-31 NOTE — ASSESSMENT & PLAN NOTE
Contributing Nutrition Diagnosis  Severe chronic condition related malnutrition    Related to (etiology):   Decreased appetite    Signs and Symptoms (as evidenced by):   1) > 7.5% wt loss x 3 months  2) moderate-severe wasting as charted below    Interventions:  above    Nutrition Diagnosis Status:   new

## 2021-12-31 NOTE — ASSESSMENT & PLAN NOTE
Treat underlying causes, which is CHF exacerbation.  Supplement oxygen.  Monitor oximetry readings and ABG's as needed.  Follow pulmonology's recommendations.

## 2021-12-31 NOTE — PLAN OF CARE
Problem: Physical Therapy Goal  Goal: Physical Therapy Goal  Description: Goals to be met by: 22    Patient will increase functional independence with mobility by performin. Supine to sit with MInimal Assistance  2. Sit to supine with MInimal Assistance  3. Sit to stand transfer with Minimal Assistance  4. Bed to chair transfer with Minimal Assistance using Rolling Walker    Outcome: Ongoing, Progressing

## 2021-12-31 NOTE — ASSESSMENT & PLAN NOTE
Probably anemia of chronic blood loss.  Will monitor HGB.  Stable at the moment.    Lab Results   Component Value Date    HGB 8.6 (L) 12/30/2021

## 2021-12-31 NOTE — ASSESSMENT & PLAN NOTE
Give supplement and monitor level.  Controlled.    Potassium   Date Value Ref Range Status   12/30/2021 3.9 3.5 - 5.1 mmol/L Final

## 2021-12-31 NOTE — ASSESSMENT & PLAN NOTE
Diagnosed during current admission via endoscopic exam.  Biopsies obtained.  Monitor for bleeding.  Patient chooses not to have treatment for it, neither surgical nor medical.  Will consult palliative care service for recommendations.

## 2021-12-31 NOTE — PROGRESS NOTES
[facility]  Adult Nutrition  Progress Note    SUMMARY      Intervention: general helathful diet and nutrition supplement therapy     Recommendation:  1) Change diet to low sodium, renal diet + Nepro BID   2) Weigh daily or as needed   3) If pt does not tolerate diet advancement consider supplemental nutrition support    Goals: 1) PO intakes > 50% of meals and supplements at f/u  Nutrition Goal Status: new  Communication of RD Recs: reviewed with physician (POC, sticky note, second sign)    Assessment and Plan    Severe malnutrition  Contributing Nutrition Diagnosis  Severe chronic condition related malnutrition    Related to (etiology):   Decreased appetite    Signs and Symptoms (as evidenced by):   1) > 7.5% wt loss x 3 months  2) moderate-severe wasting as charted below    Interventions:  above    Nutrition Diagnosis Status:   new           Malnutrition Assessment     Skin (Micronutrient):  (Gabino = 14)  Teeth (Micronutrient):  (missing some)   Micronutrient Evaluation: suspected deficiency,suspected toxicity  Micronutrient Evaluation Comments: check K and iron, Phos elevated   Weight Loss (Malnutrition): greater than 7.5% in 3 months   Orbital Region (Subcutaneous Fat Loss): mild depletion  Upper Arm Region (Subcutaneous Fat Loss): severe depletion   Synagogue Region (Muscle Loss): mild depletion  Clavicle Bone Region (Muscle Loss): severe depletion  Clavicle and Acromion Bone Region (Muscle Loss): moderate depletion  Dorsal Hand (Muscle Loss): moderate depletion  Patellar Region (Muscle Loss): severe depletion  Anterior Thigh Region (Muscle Loss): severe depletion  Posterior Calf Region (Muscle Loss): severe depletion   Edema (Fluid Accumulation): 2-->mild   Subcutaneous Fat Loss (Final Summary): severe protein-calorie malnutrition  Muscle Loss Evaluation (Final Summary): severe protein-calorie malnutrition         Reason for Assessment    Reason For Assessment: identified at risk by screening  "criteria  Diagnosis:  (acute CHF)  Relevant Medical History: erosive gastritis, COPD, HTN, CHF, lung CA  Interdisciplinary Rounds: did not attend (none today)    General Information Comments: 77 y/o female admitted with CHF and JOSE-improving. ? partial SBO and rectal bleed. NPO/clears x 4 days. Tells me she eats 2-3 meals/day at the nursing home on a heart healthy diet, but has not eaten a good meal since  day. Per GI no more bleeding, signing off and pt's diet advanced today ( did tolerate a few scopps of J-ello and sips of cranberry juice this morning). NFPE done 21 moderate-severe wasting seen. Significant wt loss per chart review ( ? % r/t fluid shifts with CHF).    Nutrition Discharge Planning: To be determined- Cardiac, low sodium diet and fluid restriction per MD + Boost pudding BID    Nutrition Risk Screen    Nutrition Risk Screen: no indicators present    Nutrition/Diet History    Patient Reported Diet/Restrictions/Preferences: heart healthy  Spiritual, Cultural Beliefs, Pentecostalism Practices, Values that Affect Care: no  Food Allergies: NKFA  Factors Affecting Nutritional Intake: clear liquid diet,altered gastrointestinal function    Anthropometrics    Temp: 97.4 °F (36.3 °C)  Height Method: Measured (office 21)  Height: 5' 4"  Height (inches): 64 in  Weight Method: Bed Scale  Weight: 47.9 kg (105 lb 9.6 oz)  Weight (lb): 105.6 lb  Ideal Body Weight (IBW), Female: 120 lb  % Ideal Body Weight, Female (lb): 88 %  BMI (Calculated): 18.1  BMI Grade: 17 - 18.4 protein-energy malnutrition grade I  Usual Body Weight (UBW), k.8 kg (21)  % Usual Body Weight: 86.02  % Weight Change From Usual Weight: -14.16 %       Lab/Procedures/Meds    Pertinent Labs Reviewed: reviewed  BMP  Lab Results   Component Value Date     2021    K 3.3 (L) 2021    CL 87 (L) 2021    CO2 36 (H) 2021    BUN 13 2021    CREATININE 1.6 (H) 2021    CALCIUM 9.4 2021    " ANIONGAP 15 12/31/2021    ESTGFRAFRICA 36 (A) 12/31/2021    EGFRNONAA 31 (A) 12/31/2021     Lab Results   Component Value Date    CALCIUM 9.4 12/31/2021    PHOS 4.7 (H) 12/31/2021     Lab Results   Component Value Date    ALBUMIN 2.6 (L) 12/31/2021     BNP  Recent Labs   Lab 12/26/21  1902   BNP 1,540*       Pertinent Medications Reviewed: reviewed  Pertinent Medications Comments: KNaPhos, zofran, Kbicarb, B12, iron, lasix, MVI, polyethylene glycol, Vitamin D    Estimated/Assessed Needs    Weight Used For Calorie Calculations: 47.9 kg (105 lb 9.6 oz)  Energy Calorie Requirements (kcal): MSJ ( x 1.4-1.5) = 2473-8314 kcal  Energy Need Method: [employer]  Protein Requirements: 1.2-1.4 g protein/kg ( ae/wasting)  = 57-67 g  Weight Used For Protein Calculations: 47.9 kg (105 lb 9.6 oz)  Fluid Requirements (mL): 2550-9252 ml or per MD  Estimated Fluid Requirement Method: RDA Method  CHO Requirement: N/A      Nutrition Prescription Ordered    Current Diet Order: regular + Boost plus BID    Evaluation of Received Nutrient/Fluid Intake    Energy Calories Required: not meeting needs  Protein Required: not meeting needs  Fluid Required: not meeting needs  Tolerance: tolerating  % Intake of Estimated Energy Needs: 10-15%  % Meal Intake: 25-50% clears    Nutrition Risk    Level of Risk/Frequency of Follow-up: moderate 2 x weekly    Monitor and Evaluation    Food and Nutrient Intake: energy intake,food and beverage intake  Food and Nutrient Adminstration: diet order  Anthropometric Measurements: weight  Biochemical Data, Medical Tests and Procedures: gastrointestinal profile,electrolyte and renal panel  Nutrition-Focused Physical Findings: overall appearance     Nutrition Follow-Up    RD Follow-up?: Yes

## 2022-01-01 LAB
ALBUMIN SERPL BCP-MCNC: 2.4 G/DL (ref 3.5–5.2)
ALP SERPL-CCNC: 45 U/L (ref 55–135)
ALT SERPL W/O P-5'-P-CCNC: 12 U/L (ref 10–44)
ANION GAP SERPL CALC-SCNC: 13 MMOL/L (ref 8–16)
AST SERPL-CCNC: 14 U/L (ref 10–40)
BACTERIA BLD CULT: NORMAL
BASOPHILS # BLD AUTO: 0.02 K/UL (ref 0–0.2)
BASOPHILS NFR BLD: 0.3 % (ref 0–1.9)
BILIRUB SERPL-MCNC: 0.4 MG/DL (ref 0.1–1)
BUN SERPL-MCNC: 14 MG/DL (ref 8–23)
CALCIUM SERPL-MCNC: 8.5 MG/DL (ref 8.7–10.5)
CHLORIDE SERPL-SCNC: 85 MMOL/L (ref 95–110)
CO2 SERPL-SCNC: 39 MMOL/L (ref 23–29)
CREAT SERPL-MCNC: 1.7 MG/DL (ref 0.5–1.4)
DIFFERENTIAL METHOD: ABNORMAL
EOSINOPHIL # BLD AUTO: 0.7 K/UL (ref 0–0.5)
EOSINOPHIL NFR BLD: 8.8 % (ref 0–8)
ERYTHROCYTE [DISTWIDTH] IN BLOOD BY AUTOMATED COUNT: 15 % (ref 11.5–14.5)
EST. GFR  (AFRICAN AMERICAN): 33 ML/MIN/1.73 M^2
EST. GFR  (NON AFRICAN AMERICAN): 29 ML/MIN/1.73 M^2
GLUCOSE SERPL-MCNC: 94 MG/DL (ref 70–110)
HCT VFR BLD AUTO: 24.8 % (ref 37–48.5)
HGB BLD-MCNC: 7.3 G/DL (ref 12–16)
IMM GRANULOCYTES # BLD AUTO: 0.06 K/UL (ref 0–0.04)
IMM GRANULOCYTES NFR BLD AUTO: 0.8 % (ref 0–0.5)
LYMPHOCYTES # BLD AUTO: 1.7 K/UL (ref 1–4.8)
LYMPHOCYTES NFR BLD: 22.2 % (ref 18–48)
MAGNESIUM SERPL-MCNC: 1.9 MG/DL (ref 1.6–2.6)
MCH RBC QN AUTO: 27.5 PG (ref 27–31)
MCHC RBC AUTO-ENTMCNC: 29.4 G/DL (ref 32–36)
MCV RBC AUTO: 94 FL (ref 82–98)
MONOCYTES # BLD AUTO: 0.5 K/UL (ref 0.3–1)
MONOCYTES NFR BLD: 6.9 % (ref 4–15)
NEUTROPHILS # BLD AUTO: 4.7 K/UL (ref 1.8–7.7)
NEUTROPHILS NFR BLD: 61 % (ref 38–73)
NRBC BLD-RTO: 0 /100 WBC
PHOSPHATE SERPL-MCNC: 5.1 MG/DL (ref 2.7–4.5)
PLATELET # BLD AUTO: 331 K/UL (ref 150–450)
PMV BLD AUTO: 9.2 FL (ref 9.2–12.9)
POTASSIUM SERPL-SCNC: 3.6 MMOL/L (ref 3.5–5.1)
PROT SERPL-MCNC: 5.4 G/DL (ref 6–8.4)
RBC # BLD AUTO: 2.65 M/UL (ref 4–5.4)
SODIUM SERPL-SCNC: 137 MMOL/L (ref 136–145)
WBC # BLD AUTO: 7.7 K/UL (ref 3.9–12.7)

## 2022-01-01 PROCEDURE — 94640 AIRWAY INHALATION TREATMENT: CPT

## 2022-01-01 PROCEDURE — 94761 N-INVAS EAR/PLS OXIMETRY MLT: CPT

## 2022-01-01 PROCEDURE — 25000003 PHARM REV CODE 250: Performed by: HOSPITALIST

## 2022-01-01 PROCEDURE — 85025 COMPLETE CBC W/AUTO DIFF WBC: CPT | Performed by: STUDENT IN AN ORGANIZED HEALTH CARE EDUCATION/TRAINING PROGRAM

## 2022-01-01 PROCEDURE — 63600175 PHARM REV CODE 636 W HCPCS: Performed by: HOSPITALIST

## 2022-01-01 PROCEDURE — 27000221 HC OXYGEN, UP TO 24 HOURS

## 2022-01-01 PROCEDURE — 83735 ASSAY OF MAGNESIUM: CPT | Performed by: STUDENT IN AN ORGANIZED HEALTH CARE EDUCATION/TRAINING PROGRAM

## 2022-01-01 PROCEDURE — 84100 ASSAY OF PHOSPHORUS: CPT | Performed by: STUDENT IN AN ORGANIZED HEALTH CARE EDUCATION/TRAINING PROGRAM

## 2022-01-01 PROCEDURE — 25000242 PHARM REV CODE 250 ALT 637 W/ HCPCS: Performed by: STUDENT IN AN ORGANIZED HEALTH CARE EDUCATION/TRAINING PROGRAM

## 2022-01-01 PROCEDURE — 63600175 PHARM REV CODE 636 W HCPCS: Performed by: STUDENT IN AN ORGANIZED HEALTH CARE EDUCATION/TRAINING PROGRAM

## 2022-01-01 PROCEDURE — 25000003 PHARM REV CODE 250: Performed by: STUDENT IN AN ORGANIZED HEALTH CARE EDUCATION/TRAINING PROGRAM

## 2022-01-01 PROCEDURE — C9113 INJ PANTOPRAZOLE SODIUM, VIA: HCPCS | Performed by: STUDENT IN AN ORGANIZED HEALTH CARE EDUCATION/TRAINING PROGRAM

## 2022-01-01 PROCEDURE — 36410 VNPNXR 3YR/> PHY/QHP DX/THER: CPT

## 2022-01-01 PROCEDURE — 63600175 PHARM REV CODE 636 W HCPCS: Performed by: INTERNAL MEDICINE

## 2022-01-01 PROCEDURE — 99900035 HC TECH TIME PER 15 MIN (STAT)

## 2022-01-01 PROCEDURE — 11000001 HC ACUTE MED/SURG PRIVATE ROOM

## 2022-01-01 PROCEDURE — 25000003 PHARM REV CODE 250: Performed by: INTERNAL MEDICINE

## 2022-01-01 PROCEDURE — 36415 COLL VENOUS BLD VENIPUNCTURE: CPT | Performed by: STUDENT IN AN ORGANIZED HEALTH CARE EDUCATION/TRAINING PROGRAM

## 2022-01-01 PROCEDURE — 80053 COMPREHEN METABOLIC PANEL: CPT | Performed by: STUDENT IN AN ORGANIZED HEALTH CARE EDUCATION/TRAINING PROGRAM

## 2022-01-01 RX ORDER — DIGOXIN 125 MCG
0.12 TABLET ORAL DAILY
Status: DISCONTINUED | OUTPATIENT
Start: 2022-01-02 | End: 2022-01-05 | Stop reason: HOSPADM

## 2022-01-01 RX ORDER — ACETAMINOPHEN 500 MG
1000 TABLET ORAL EVERY 8 HOURS PRN
Status: DISCONTINUED | OUTPATIENT
Start: 2022-01-01 | End: 2022-01-05 | Stop reason: HOSPADM

## 2022-01-01 RX ORDER — HYDROCODONE BITARTRATE AND ACETAMINOPHEN 10; 325 MG/1; MG/1
2 TABLET ORAL EVERY 6 HOURS PRN
Status: DISCONTINUED | OUTPATIENT
Start: 2022-01-01 | End: 2022-01-05 | Stop reason: HOSPADM

## 2022-01-01 RX ORDER — PROCHLORPERAZINE EDISYLATE 5 MG/ML
10 INJECTION INTRAMUSCULAR; INTRAVENOUS EVERY 6 HOURS PRN
Status: DISCONTINUED | OUTPATIENT
Start: 2022-01-01 | End: 2022-01-05 | Stop reason: HOSPADM

## 2022-01-01 RX ORDER — OXYCODONE HYDROCHLORIDE 5 MG/1
5 TABLET ORAL EVERY 6 HOURS PRN
Status: DISCONTINUED | OUTPATIENT
Start: 2022-01-01 | End: 2022-01-05 | Stop reason: HOSPADM

## 2022-01-01 RX ADMIN — ONDANSETRON 8 MG: 2 INJECTION INTRAMUSCULAR; INTRAVENOUS at 12:01

## 2022-01-01 RX ADMIN — POLYETHYLENE GLYCOL 3350 17 G: 17 POWDER, FOR SOLUTION ORAL at 08:01

## 2022-01-01 RX ADMIN — POTASSIUM & SODIUM PHOSPHATES POWDER PACK 280-160-250 MG 1 PACKET: 280-160-250 PACK at 04:01

## 2022-01-01 RX ADMIN — PANTOPRAZOLE SODIUM 40 MG: 40 INJECTION, POWDER, LYOPHILIZED, FOR SOLUTION INTRAVENOUS at 08:01

## 2022-01-01 RX ADMIN — LIDOCAINE 5% 1 PATCH: 700 PATCH TOPICAL at 02:01

## 2022-01-01 RX ADMIN — MIDODRINE HYDROCHLORIDE 5 MG: 2.5 TABLET ORAL at 10:01

## 2022-01-01 RX ADMIN — FLUTICASONE FUROATE AND VILANTEROL TRIFENATATE 1 PUFF: 100; 25 POWDER RESPIRATORY (INHALATION) at 07:01

## 2022-01-01 RX ADMIN — SODIUM CHLORIDE: 0.9 INJECTION, SOLUTION INTRAVENOUS at 12:01

## 2022-01-01 RX ADMIN — IPRATROPIUM BROMIDE AND ALBUTEROL SULFATE 3 ML: 2.5; .5 SOLUTION RESPIRATORY (INHALATION) at 07:01

## 2022-01-01 RX ADMIN — IPRATROPIUM BROMIDE AND ALBUTEROL SULFATE 3 ML: 2.5; .5 SOLUTION RESPIRATORY (INHALATION) at 12:01

## 2022-01-01 RX ADMIN — POTASSIUM & SODIUM PHOSPHATES POWDER PACK 280-160-250 MG 1 PACKET: 280-160-250 PACK at 08:01

## 2022-01-01 RX ADMIN — DILTIAZEM HYDROCHLORIDE 90 MG: 60 TABLET, FILM COATED ORAL at 02:01

## 2022-01-01 RX ADMIN — PIPERACILLIN AND TAZOBACTAM 4.5 G: 4; .5 INJECTION, POWDER, LYOPHILIZED, FOR SOLUTION INTRAVENOUS; PARENTERAL at 12:01

## 2022-01-01 RX ADMIN — PROCHLORPERAZINE EDISYLATE 10 MG: 5 INJECTION INTRAMUSCULAR; INTRAVENOUS at 02:01

## 2022-01-01 RX ADMIN — MIRTAZAPINE 15 MG: 15 TABLET, FILM COATED ORAL at 10:01

## 2022-01-01 RX ADMIN — POTASSIUM & SODIUM PHOSPHATES POWDER PACK 280-160-250 MG 1 PACKET: 280-160-250 PACK at 10:01

## 2022-01-01 RX ADMIN — HYDROCODONE BITARTRATE AND ACETAMINOPHEN 2 TABLET: 10; 325 TABLET ORAL at 10:01

## 2022-01-01 RX ADMIN — THERA TABS 1 TABLET: TAB at 08:01

## 2022-01-01 RX ADMIN — MIDODRINE HYDROCHLORIDE 5 MG: 2.5 TABLET ORAL at 02:01

## 2022-01-01 RX ADMIN — POTASSIUM & SODIUM PHOSPHATES POWDER PACK 280-160-250 MG 1 PACKET: 280-160-250 PACK at 12:01

## 2022-01-01 RX ADMIN — MIDODRINE HYDROCHLORIDE 5 MG: 2.5 TABLET ORAL at 08:01

## 2022-01-01 RX ADMIN — LORAZEPAM 0.5 MG: 2 INJECTION INTRAMUSCULAR; INTRAVENOUS at 10:01

## 2022-01-01 RX ADMIN — DIGOXIN 125 MCG: 250 INJECTION, SOLUTION INTRAMUSCULAR; INTRAVENOUS at 08:01

## 2022-01-01 RX ADMIN — DILTIAZEM HYDROCHLORIDE 90 MG: 60 TABLET, FILM COATED ORAL at 10:01

## 2022-01-01 RX ADMIN — DILTIAZEM HYDROCHLORIDE 60 MG: 60 TABLET, FILM COATED ORAL at 05:01

## 2022-01-01 RX ADMIN — LORAZEPAM 0.5 MG: 2 INJECTION INTRAMUSCULAR; INTRAVENOUS at 12:01

## 2022-01-01 RX ADMIN — Medication 1000 UNITS: at 08:01

## 2022-01-01 RX ADMIN — FUROSEMIDE 40 MG: 10 INJECTION, SOLUTION INTRAMUSCULAR; INTRAVENOUS at 08:01

## 2022-01-01 RX ADMIN — CYANOCOBALAMIN TAB 1000 MCG 1000 MCG: 1000 TAB at 08:01

## 2022-01-01 NOTE — ASSESSMENT & PLAN NOTE
New problem today.  Should come down with the use of Lasix.  Monitor.    Potassium   Date Value Ref Range Status   12/31/2021 5.4 (H) 3.5 - 5.1 mmol/L Final

## 2022-01-01 NOTE — ASSESSMENT & PLAN NOTE
Improved.  Monitor renal labs.  Monitor UOP.    Lab Results   Component Value Date    CREATININE 1.6 (H) 12/31/2021

## 2022-01-01 NOTE — CARE UPDATE
12/31/21 1940   Patient Assessment/Suction   Level of Consciousness (AVPU) alert   Respiratory Effort Normal;Unlabored   Expansion/Accessory Muscles/Retractions expansion symmetric;no retractions;no use of accessory muscles   All Lung Fields Breath Sounds clear   Cough Frequency no cough   PRE-TX-O2   O2 Device (Oxygen Therapy) nasal cannula   Flow (L/min) 2   Oxygen Concentration (%) 28   SpO2 95 %   Pulse Oximetry Type Intermittent   Pulse 110   Resp 18   Aerosol Therapy   $ Aerosol Therapy Charges Aerosol Treatment   Respiratory Treatment Status (SVN) given   Treatment Route (SVN) mask   Patient Position (SVN) HOB elevated   Signs of Intolerance (SVN) none   Breath Sounds Post-Respiratory Treatment   Throughout All Fields Post-Treatment All Fields   Throughout All Fields Post-Treatment no change   Post-treatment Heart Rate (beats/min) 102   Post-treatment Resp Rate (breaths/min) 18   Ready to Wean/Extubation Screen   FIO2<=50 (chart decimal) 0.28

## 2022-01-01 NOTE — SUBJECTIVE & OBJECTIVE
Interval History:   Anemia is controlled.   Less short of breath.  Continues with rapid atrial fibrillation and is still on IV diltiazem infusion.  She continues to be opposed to surgery for the newly diagnosed colon cancer.    Review of Systems   Constitutional: Positive for fatigue. Negative for chills and fever.   Respiratory: Positive for shortness of breath. Negative for cough.    Cardiovascular: Negative for chest pain and leg swelling.   Gastrointestinal: Negative for abdominal pain, nausea and vomiting.     Objective:     Vital Signs (Most Recent):  Temp: 97.2 °F (36.2 °C) (12/31/21 1932)  Pulse: 110 (12/31/21 1940)  Resp: 18 (12/31/21 1940)  BP: (!) 112/50 (12/31/21 1932)  SpO2: 95 % (12/31/21 1940) Vital Signs (24h Range):  Temp:  [96.6 °F (35.9 °C)-97.4 °F (36.3 °C)] 97.2 °F (36.2 °C)  Pulse:  [] 110  Resp:  [16-20] 18  SpO2:  [90 %-97 %] 95 %  BP: (112-137)/(50-66) 112/50     Weight: 49 kg (108 lb 0.4 oz)  Body mass index is 18.54 kg/m².    Intake/Output Summary (Last 24 hours) at 12/31/2021 2042  Last data filed at 12/31/2021 1900  Gross per 24 hour   Intake --   Output 1900 ml   Net -1900 ml      Physical Exam  Vitals reviewed.   Constitutional:       General: She is not in acute distress.     Comments: Frail   Eyes:      General:         Right eye: No discharge.         Left eye: No discharge.   Neck:      Vascular: No JVD.   Cardiovascular:      Rate and Rhythm: Tachycardia present. Rhythm regularly irregular.   Pulmonary:      Effort: Pulmonary effort is normal.      Breath sounds: Normal breath sounds. Decreased air movement present.   Abdominal:      General: Bowel sounds are normal. There is no distension.      Palpations: Abdomen is soft.      Tenderness: There is no abdominal tenderness.   Skin:     General: Skin is warm.      Coloration: Skin is pale.      Findings: No rash.   Neurological:      Mental Status: She is alert.         Significant Labs: All pertinent labs within the past 24  hours have been reviewed.    Significant Imaging: I have reviewed all pertinent imaging results/findings within the past 24 hours.

## 2022-01-01 NOTE — ASSESSMENT & PLAN NOTE
Diagnosed during current admission via endoscopic exam.  Biopsies obtained.  Monitor for bleeding.  Patient chooses not to have treatment for it, neither surgical nor medical.  I consulted palliative care service for recommendations.

## 2022-01-01 NOTE — PROGRESS NOTES
[facility] Hospital Medicine  Progress Note    Patient Name: Patrizia Valadez  MRN: [id number]  Patient Class: IP- Inpatient   Admission Date: 12/26/2021  Length of Stay: 5 days  Attending Physician: Reyes Olmstead MD  Primary Care Provider: Anya Farr NP        Subjective:     Principal Problem:Acute on chronic diastolic congestive heart failure        HPI:  Patrizia Valadez is a 75-year-old female with a past medical history of COPD, chronic respiratory failure on oxygen at home, lymphoma, and past surgical history of cholecystectomy who presented to the ED with a complaint of shortness of breath. HPI is limited due to acuity of condition. She was sent from a nursing some for decreased mental status as well as low oxygen saturation and respiratory distress. Per ED notes, EMS found the patient to have a pulse ox of 79% on arrival. She received a duoneb treatment, albuterol treatment and solumedrol 125 mg prior to arrival.     Upon arrival to the ED, the patient was found to be altered and confused, and in respiratory distress. She was placed on a non re breather mask, which improved her oxygenation to 98% but she did require non invasive ventilation. She was placed on BiPAP at 50%, 14/6, which has improved her respiratory status. She is more alert and awake, and is able to answer simple questions appropriately. Chest xray revealed bilateral pneumonia along with chronic findings. CBC revealed leukocytosis of 12.87 and what appears to be chronic anemia. Initial lactic acid level was elevated at 2.4, but has decreased to 1.5. Troponin negative, urine negative. BNP elevated at 1540. She was given one dose of Lasix 40 mg IV. Due to continued use of BiPAP and high acuity, she will be admitted to the ICU. The patient does state she is a DNR, review of medical record does show past recent admissions as a DNR. She does not have paper work with her today. Hospital Medicine consulted for admission and  further management.          Overview/Hospital Course:  Patrizia Valadez is a 75 year old female with a past medical history of COPD, chronic respiratory failure on oxygen at home, lymphoma, HANNAH, Afib, and CHF who presented to the ED with a complaint of shortness of breath. She was found to have an acute on chronic CHF exacerbation and is being treated with IV Lasix. She has required BiPAP therapy and was admitted to the ICU. She was placed empiric Zosyn as well for a possible superimposed pneumonia (WBC and procalcitonin elevated.) Pulmonary and Cardiology have also been consulted. Her course was complicated by Afib for which the patient is on a diltiazem infusion. Anticoagulation with Eliquis was held given a witnessed bloody bowel movement. GI has been consulted and will perform colonoscopy 12/29. She was diuresed roughly 2.2 L and she was able to be weaned off BiPAP to 2 L NC. Her course was complicated by JOSE likely secondary to over-diuresis. Lasix has held and a 500 cc LR bolus was ordered. PT/OT was consulted and the patient was transferred out of the ICU 12/29.      Interval History:   Anemia is controlled.   Less short of breath.  Continues with rapid atrial fibrillation and is still on IV diltiazem infusion.  She continues to be opposed to surgery for the newly diagnosed colon cancer.    Review of Systems   Constitutional: Positive for fatigue. Negative for chills and fever.   Respiratory: Positive for shortness of breath. Negative for cough.    Cardiovascular: Negative for chest pain and leg swelling.   Gastrointestinal: Negative for abdominal pain, nausea and vomiting.     Objective:     Vital Signs (Most Recent):  Temp: 97.2 °F (36.2 °C) (12/31/21 1932)  Pulse: 110 (12/31/21 1940)  Resp: 18 (12/31/21 1940)  BP: (!) 112/50 (12/31/21 1932)  SpO2: 95 % (12/31/21 1940) Vital Signs (24h Range):  Temp:  [96.6 °F (35.9 °C)-97.4 °F (36.3 °C)] 97.2 °F (36.2 °C)  Pulse:  [] 110  Resp:  [16-20] 18  SpO2:  [90  %-97 %] 95 %  BP: (112-137)/(50-66) 112/50     Weight: 49 kg (108 lb 0.4 oz)  Body mass index is 18.54 kg/m².    Intake/Output Summary (Last 24 hours) at 12/31/2021 2042  Last data filed at 12/31/2021 1900  Gross per 24 hour   Intake --   Output 1900 ml   Net -1900 ml      Physical Exam  Vitals reviewed.   Constitutional:       General: She is not in acute distress.     Comments: Frail   Eyes:      General:         Right eye: No discharge.         Left eye: No discharge.   Neck:      Vascular: No JVD.   Cardiovascular:      Rate and Rhythm: Tachycardia present. Rhythm regularly irregular.   Pulmonary:      Effort: Pulmonary effort is normal.      Breath sounds: Normal breath sounds. Decreased air movement present.   Abdominal:      General: Bowel sounds are normal. There is no distension.      Palpations: Abdomen is soft.      Tenderness: There is no abdominal tenderness.   Skin:     General: Skin is warm.      Coloration: Skin is pale.      Findings: No rash.   Neurological:      Mental Status: She is alert.         Significant Labs: All pertinent labs within the past 24 hours have been reviewed.    Significant Imaging: I have reviewed all pertinent imaging results/findings within the past 24 hours.      Assessment/Plan:      * Acute on chronic diastolic congestive heart failure  Improved.  Continue IV loop diuretic.  BP too low for beta blocker or ACE inhibition.  Monitor volume status.  Follow cardiology's recommendations.    Hyperkalemia  New problem today.  Should come down with the use of Lasix.  Monitor.    Potassium   Date Value Ref Range Status   12/31/2021 5.4 (H) 3.5 - 5.1 mmol/L Final         JOSE (acute kidney injury)  Improved.  Monitor renal labs.  Monitor UOP.    Lab Results   Component Value Date    CREATININE 1.6 (H) 12/31/2021         Colon cancer  Diagnosed during current admission via endoscopic exam.  Biopsies obtained.  Monitor for bleeding.  Patient chooses not to have treatment for it,  neither surgical nor medical.  I consulted palliative care service for recommendations.    Chronic hypotension on midodrine  Controlled.  Monitor BP.  Continue midodrine.    Anemia due to chronic blood loss  Probably anemia of chronic blood loss.  Will monitor HGB.  Stable at the moment.    Lab Results   Component Value Date    HGB 8.4 (L) 12/31/2021         Paroxysmal atrial fibrillation  Uncontrolled.  Continue diltiazem infusion.  Monitor heart rate.  Keep K within good range.  Follow cardiology's recommendations.    Multifocal atrial tachycardia        Acute on chronic respiratory failure with hypoxia and hypercapnia  Treat underlying causes, which is CHF exacerbation.  Supplement oxygen.  Monitor oximetry readings and ABG's as needed.  Follow pulmonology's recommendations.    COPD (chronic obstructive pulmonary disease)  Stable.  Continue bronchodilator treatments.  Follow pulmonology's recommendations.      VTE Risk Mitigation (From admission, onward)         Ordered     Place sequential compression device  Until discontinued         12/27/21 0302                Discharge Planning   VAZQUEZ: 1/3/2022     Code Status: DNR   Is the patient medically ready for discharge?:     Reason for patient still in hospital (select all that apply): Patient new problem, Patient trending condition, Laboratory test and Treatment  Discharge Plan A: Return to nursing home                  Reyes Olmstead MD  Department of Hospital Medicine   [facility]

## 2022-01-01 NOTE — PROGRESS NOTES
The patient's heart rate remains a bit high off of IV Cardizem.  I will increase oral Cardizem from 60 mg t.i.d. to 90 mg t.i.d.  Continue digoxin 0.125 mg daily

## 2022-01-01 NOTE — PLAN OF CARE
Problem: Adult Inpatient Plan of Care  Goal: Plan of Care Review  Outcome: Ongoing, Progressing     Problem: Adult Inpatient Plan of Care  Goal: Patient-Specific Goal (Individualized)  Outcome: Ongoing, Progressing     Problem: Adult Inpatient Plan of Care  Goal: Absence of Hospital-Acquired Illness or Injury  Outcome: Ongoing, Progressing     Problem: Adult Inpatient Plan of Care  Goal: Optimal Comfort and Wellbeing  Outcome: Ongoing, Progressing     Problem: Adult Inpatient Plan of Care  Goal: Readiness for Transition of Care  Outcome: Ongoing, Progressing       Request for p.o potassium d/t P.I potassium causing pain and irritating pt. P.O Potassium increased. Serum K+ labs drawn and repeat K+ level is 5.4 (it may have hemolysis).     Pt accidentally removed PIVs. Therefore evening doses of Diltiazem and  piperacillin were not given. Awaiting Midline placement.

## 2022-01-01 NOTE — PLAN OF CARE
Patient alert and oriented this shift. VSS. Incontinent of Bowel--BM 01/01. Stout in place for retention. Pt had 2-3 episodes of emesis. PRN zofran given, but compazine ordered by MD as well. Pt c/o pain in lower abdomen-- so prn pain meds ordered by MD as well. Patient BR, but working with PT/OT. Turns independently in bed. Call light within reach and patient demonstrates proper use.

## 2022-01-01 NOTE — PLAN OF CARE
Pt is alert and oriented. Midline inserted in right upper arm at 2300. 2L oxygen nc. Weight shift assistance provided. Pt in A fib. Bed in lowest position. All alarms on.

## 2022-01-02 PROBLEM — E87.5 HYPERKALEMIA: Status: RESOLVED | Noted: 2021-12-31 | Resolved: 2022-01-02

## 2022-01-02 LAB
ALBUMIN SERPL BCP-MCNC: 2.4 G/DL (ref 3.5–5.2)
ALP SERPL-CCNC: 47 U/L (ref 55–135)
ALT SERPL W/O P-5'-P-CCNC: 12 U/L (ref 10–44)
ANION GAP SERPL CALC-SCNC: 14 MMOL/L (ref 8–16)
AST SERPL-CCNC: 12 U/L (ref 10–40)
BASOPHILS # BLD AUTO: 0.02 K/UL (ref 0–0.2)
BASOPHILS NFR BLD: 0.2 % (ref 0–1.9)
BILIRUB SERPL-MCNC: 0.3 MG/DL (ref 0.1–1)
BUN SERPL-MCNC: 10 MG/DL (ref 8–23)
CALCIUM SERPL-MCNC: 8.2 MG/DL (ref 8.7–10.5)
CHLORIDE SERPL-SCNC: 89 MMOL/L (ref 95–110)
CO2 SERPL-SCNC: 35 MMOL/L (ref 23–29)
CREAT SERPL-MCNC: 1.3 MG/DL (ref 0.5–1.4)
DIFFERENTIAL METHOD: ABNORMAL
EOSINOPHIL # BLD AUTO: 0.8 K/UL (ref 0–0.5)
EOSINOPHIL NFR BLD: 9.1 % (ref 0–8)
ERYTHROCYTE [DISTWIDTH] IN BLOOD BY AUTOMATED COUNT: 15 % (ref 11.5–14.5)
EST. GFR  (AFRICAN AMERICAN): 46 ML/MIN/1.73 M^2
EST. GFR  (NON AFRICAN AMERICAN): 40 ML/MIN/1.73 M^2
GLUCOSE SERPL-MCNC: 84 MG/DL (ref 70–110)
HCT VFR BLD AUTO: 24.3 % (ref 37–48.5)
HGB BLD-MCNC: 7 G/DL (ref 12–16)
IMM GRANULOCYTES # BLD AUTO: 0.06 K/UL (ref 0–0.04)
IMM GRANULOCYTES NFR BLD AUTO: 0.7 % (ref 0–0.5)
LYMPHOCYTES # BLD AUTO: 2.1 K/UL (ref 1–4.8)
LYMPHOCYTES NFR BLD: 23.2 % (ref 18–48)
MAGNESIUM SERPL-MCNC: 1.8 MG/DL (ref 1.6–2.6)
MCH RBC QN AUTO: 26.9 PG (ref 27–31)
MCHC RBC AUTO-ENTMCNC: 28.8 G/DL (ref 32–36)
MCV RBC AUTO: 94 FL (ref 82–98)
MONOCYTES # BLD AUTO: 0.7 K/UL (ref 0.3–1)
MONOCYTES NFR BLD: 7.2 % (ref 4–15)
NEUTROPHILS # BLD AUTO: 5.4 K/UL (ref 1.8–7.7)
NEUTROPHILS NFR BLD: 59.6 % (ref 38–73)
NRBC BLD-RTO: 0 /100 WBC
PHOSPHATE SERPL-MCNC: 5.2 MG/DL (ref 2.7–4.5)
PLATELET # BLD AUTO: 358 K/UL (ref 150–450)
PMV BLD AUTO: 9.3 FL (ref 9.2–12.9)
POTASSIUM SERPL-SCNC: 3.3 MMOL/L (ref 3.5–5.1)
PROT SERPL-MCNC: 5.2 G/DL (ref 6–8.4)
RBC # BLD AUTO: 2.6 M/UL (ref 4–5.4)
SODIUM SERPL-SCNC: 138 MMOL/L (ref 136–145)
WBC # BLD AUTO: 9.12 K/UL (ref 3.9–12.7)

## 2022-01-02 PROCEDURE — 63600175 PHARM REV CODE 636 W HCPCS: Performed by: INTERNAL MEDICINE

## 2022-01-02 PROCEDURE — 94761 N-INVAS EAR/PLS OXIMETRY MLT: CPT

## 2022-01-02 PROCEDURE — C1751 CATH, INF, PER/CENT/MIDLINE: HCPCS

## 2022-01-02 PROCEDURE — 85025 COMPLETE CBC W/AUTO DIFF WBC: CPT | Performed by: STUDENT IN AN ORGANIZED HEALTH CARE EDUCATION/TRAINING PROGRAM

## 2022-01-02 PROCEDURE — 25000003 PHARM REV CODE 250: Performed by: STUDENT IN AN ORGANIZED HEALTH CARE EDUCATION/TRAINING PROGRAM

## 2022-01-02 PROCEDURE — 84100 ASSAY OF PHOSPHORUS: CPT | Performed by: STUDENT IN AN ORGANIZED HEALTH CARE EDUCATION/TRAINING PROGRAM

## 2022-01-02 PROCEDURE — 11000001 HC ACUTE MED/SURG PRIVATE ROOM

## 2022-01-02 PROCEDURE — 63600175 PHARM REV CODE 636 W HCPCS: Performed by: STUDENT IN AN ORGANIZED HEALTH CARE EDUCATION/TRAINING PROGRAM

## 2022-01-02 PROCEDURE — 25000242 PHARM REV CODE 250 ALT 637 W/ HCPCS: Performed by: STUDENT IN AN ORGANIZED HEALTH CARE EDUCATION/TRAINING PROGRAM

## 2022-01-02 PROCEDURE — 25000003 PHARM REV CODE 250: Performed by: HOSPITALIST

## 2022-01-02 PROCEDURE — 36415 COLL VENOUS BLD VENIPUNCTURE: CPT | Performed by: STUDENT IN AN ORGANIZED HEALTH CARE EDUCATION/TRAINING PROGRAM

## 2022-01-02 PROCEDURE — 27000221 HC OXYGEN, UP TO 24 HOURS

## 2022-01-02 PROCEDURE — 83735 ASSAY OF MAGNESIUM: CPT | Performed by: STUDENT IN AN ORGANIZED HEALTH CARE EDUCATION/TRAINING PROGRAM

## 2022-01-02 PROCEDURE — 94640 AIRWAY INHALATION TREATMENT: CPT

## 2022-01-02 PROCEDURE — C9113 INJ PANTOPRAZOLE SODIUM, VIA: HCPCS | Performed by: STUDENT IN AN ORGANIZED HEALTH CARE EDUCATION/TRAINING PROGRAM

## 2022-01-02 PROCEDURE — 80053 COMPREHEN METABOLIC PANEL: CPT | Performed by: STUDENT IN AN ORGANIZED HEALTH CARE EDUCATION/TRAINING PROGRAM

## 2022-01-02 PROCEDURE — 25000003 PHARM REV CODE 250: Performed by: INTERNAL MEDICINE

## 2022-01-02 RX ADMIN — DILTIAZEM HYDROCHLORIDE 90 MG: 60 TABLET, FILM COATED ORAL at 06:01

## 2022-01-02 RX ADMIN — POTASSIUM & SODIUM PHOSPHATES POWDER PACK 280-160-250 MG 1 PACKET: 280-160-250 PACK at 08:01

## 2022-01-02 RX ADMIN — MIDODRINE HYDROCHLORIDE 5 MG: 2.5 TABLET ORAL at 08:01

## 2022-01-02 RX ADMIN — CYANOCOBALAMIN TAB 1000 MCG 1000 MCG: 1000 TAB at 08:01

## 2022-01-02 RX ADMIN — THERA TABS 1 TABLET: TAB at 08:01

## 2022-01-02 RX ADMIN — IPRATROPIUM BROMIDE AND ALBUTEROL SULFATE 3 ML: 2.5; .5 SOLUTION RESPIRATORY (INHALATION) at 07:01

## 2022-01-02 RX ADMIN — IPRATROPIUM BROMIDE AND ALBUTEROL SULFATE 3 ML: 2.5; .5 SOLUTION RESPIRATORY (INHALATION) at 12:01

## 2022-01-02 RX ADMIN — LORAZEPAM 0.5 MG: 2 INJECTION INTRAMUSCULAR; INTRAVENOUS at 10:01

## 2022-01-02 RX ADMIN — Medication 1 EACH: at 08:01

## 2022-01-02 RX ADMIN — MIRTAZAPINE 15 MG: 15 TABLET, FILM COATED ORAL at 08:01

## 2022-01-02 RX ADMIN — DIGOXIN 0.12 MG: 125 TABLET ORAL at 08:01

## 2022-01-02 RX ADMIN — POTASSIUM BICARBONATE 35 MEQ: 391 TABLET, EFFERVESCENT ORAL at 08:01

## 2022-01-02 RX ADMIN — FUROSEMIDE 40 MG: 10 INJECTION, SOLUTION INTRAMUSCULAR; INTRAVENOUS at 08:01

## 2022-01-02 RX ADMIN — MIDODRINE HYDROCHLORIDE 5 MG: 2.5 TABLET ORAL at 03:01

## 2022-01-02 RX ADMIN — LIDOCAINE 5% 1 PATCH: 700 PATCH TOPICAL at 03:01

## 2022-01-02 RX ADMIN — POTASSIUM & SODIUM PHOSPHATES POWDER PACK 280-160-250 MG 1 PACKET: 280-160-250 PACK at 11:01

## 2022-01-02 RX ADMIN — FLUTICASONE FUROATE AND VILANTEROL TRIFENATATE 1 PUFF: 100; 25 POWDER RESPIRATORY (INHALATION) at 07:01

## 2022-01-02 RX ADMIN — POTASSIUM BICARBONATE 35 MEQ: 391 TABLET, EFFERVESCENT ORAL at 11:01

## 2022-01-02 RX ADMIN — POLYETHYLENE GLYCOL 3350 17 G: 17 POWDER, FOR SOLUTION ORAL at 08:01

## 2022-01-02 RX ADMIN — POTASSIUM & SODIUM PHOSPHATES POWDER PACK 280-160-250 MG 1 PACKET: 280-160-250 PACK at 04:01

## 2022-01-02 RX ADMIN — DILTIAZEM HYDROCHLORIDE 90 MG: 60 TABLET, FILM COATED ORAL at 03:01

## 2022-01-02 RX ADMIN — DILTIAZEM HYDROCHLORIDE 90 MG: 60 TABLET, FILM COATED ORAL at 09:01

## 2022-01-02 RX ADMIN — Medication 1000 UNITS: at 08:01

## 2022-01-02 RX ADMIN — PANTOPRAZOLE SODIUM 40 MG: 40 INJECTION, POWDER, LYOPHILIZED, FOR SOLUTION INTRAVENOUS at 08:01

## 2022-01-02 NOTE — PLAN OF CARE
Patient alert and oriented this shift. VSS. Potassium low, replaced per protocol. Stout removed around 1500. Purwick in place. Patient repositioning independently in bed. BM today. Still in Afib, but controlled with oral meds. Bed in lowest position and call light within reach of patient.

## 2022-01-02 NOTE — ASSESSMENT & PLAN NOTE
Diagnosed during current admission via endoscopic exam.  Biopsies obtained.  Monitor for bleeding.  Patient chooses not to have treatment for it, neither surgical nor medical.  I consulted palliative care service for recommendations.  They haven't seen her yet.

## 2022-01-02 NOTE — ASSESSMENT & PLAN NOTE
Renal function is worsening.  Monitor renal labs.  Monitor UOP.    Lab Results   Component Value Date    CREATININE 1.7 (H) 01/01/2022

## 2022-01-02 NOTE — PLAN OF CARE
Pt is alert and oriented. No complaints of nausea during shift. Bed in lowest position. All alarms on. Bilateral heel foam dressings placed. Scds assessed and removed, and reapplied.

## 2022-01-02 NOTE — CARE UPDATE
01/01/22 1936   Patient Assessment/Suction   Level of Consciousness (AVPU) alert   Respiratory Effort Normal;Unlabored   Expansion/Accessory Muscles/Retractions expansion symmetric;no retractions;no use of accessory muscles   All Lung Fields Breath Sounds clear   Cough Frequency no cough   PRE-TX-O2   O2 Device (Oxygen Therapy) nasal cannula   Flow (L/min) 2   Oxygen Concentration (%) 28   SpO2 98 %   Pulse Oximetry Type Intermittent   Pulse 94   Resp 18   Aerosol Therapy   $ Aerosol Therapy Charges Aerosol Treatment   Respiratory Treatment Status (SVN) given   Treatment Route (SVN) mask   Patient Position (SVN) HOB elevated   Signs of Intolerance (SVN) none   Breath Sounds Post-Respiratory Treatment   Throughout All Fields Post-Treatment All Fields   Throughout All Fields Post-Treatment no change   Post-treatment Heart Rate (beats/min) 98   Post-treatment Resp Rate (breaths/min) 16   Ready to Wean/Extubation Screen   FIO2<=50 (chart decimal) 0.28

## 2022-01-02 NOTE — SUBJECTIVE & OBJECTIVE
Interval History:   Anemia is controlled.  Continues with fatigue.   Less short of breath.  Continues with rapid atrial fibrillation and is off the IV diltiazem infusion.      Review of Systems   Constitutional: Positive for fatigue. Negative for chills and fever.   Respiratory: Positive for shortness of breath. Negative for cough.    Cardiovascular: Negative for chest pain and leg swelling.   Gastrointestinal: Negative for abdominal pain, nausea and vomiting.     Objective:     Vital Signs (Most Recent):  Temp: 97.5 °F (36.4 °C) (01/01/22 1930)  Pulse: 94 (01/01/22 1936)  Resp: 18 (01/01/22 1936)  BP: (!) 118/56 (01/01/22 1930)  SpO2: 98 % (01/01/22 1936) Vital Signs (24h Range):  Temp:  [96.2 °F (35.7 °C)-98.9 °F (37.2 °C)] 97.5 °F (36.4 °C)  Pulse:  [] 94  Resp:  [16-20] 18  SpO2:  [93 %-98 %] 98 %  BP: (112-130)/(50-64) 118/56     Weight: 51.5 kg (113 lb 8.6 oz)  Body mass index is 19.49 kg/m².    Intake/Output Summary (Last 24 hours) at 1/1/2022 2030  Last data filed at 1/1/2022 1700  Gross per 24 hour   Intake 1156.31 ml   Output 2850 ml   Net -1693.69 ml      Physical Exam  Vitals reviewed.   Constitutional:       General: She is not in acute distress.     Comments: Frail   Eyes:      General:         Right eye: No discharge.         Left eye: No discharge.   Neck:      Vascular: No JVD.   Cardiovascular:      Rate and Rhythm: Tachycardia present. Rhythm regularly irregular.   Pulmonary:      Effort: Pulmonary effort is normal.      Breath sounds: Normal breath sounds. Decreased air movement present.   Abdominal:      General: Bowel sounds are normal. There is no distension.      Palpations: Abdomen is soft.      Tenderness: There is no abdominal tenderness.   Skin:     General: Skin is warm.      Coloration: Skin is pale.      Findings: No rash.   Neurological:      Mental Status: She is alert.         Significant Labs: All pertinent labs within the past 24 hours have been reviewed.    Significant  Imaging: I have reviewed all pertinent imaging results/findings within the past 24 hours.

## 2022-01-02 NOTE — ASSESSMENT & PLAN NOTE
Uncontrolled.  Off diltiazem infusion.  On oral formulation.  Monitor heart rate.  Keep K within good range.  Follow cardiology's recommendations.

## 2022-01-02 NOTE — PLAN OF CARE
01/02/22 0744   Patient Assessment/Suction   Respiratory Effort Normal;Unlabored   Expansion/Accessory Muscles/Retractions expansion symmetric;no retractions   All Lung Fields Breath Sounds Anterior:;Lateral:;diminished   Cough Frequency no cough   PRE-TX-O2   O2 Device (Oxygen Therapy) nasal cannula   $ Is the patient on Low Flow Oxygen? Yes   Flow (L/min) 2   SpO2 96 %   Pulse Oximetry Type Intermittent   $ Pulse Oximetry - Multiple Charge Pulse Oximetry - Multiple   Pulse (!) 116   Resp 16   Aerosol Therapy   $ Aerosol Therapy Charges Aerosol Treatment   Respiratory Treatment Status (SVN) given   Treatment Route (SVN) mask   Patient Position (SVN) HOB elevated   Post Treatment Assessment (SVN) breath sounds unchanged   Signs of Intolerance (SVN) none   Inhaler   $ Inhaler Charges MDI (Metered Dose Inahler) Treatment   Respiratory Treatment Status (Inhaler) given   Treatment Route (Inhaler) mouthpiece   Patient Position (Inhaler) HOB elevated   Post Treatment Assessment (Inhaler) breath sounds unchanged   Signs of Intolerance (Inhaler) none   Breath Sounds Post-Respiratory Treatment   Post-treatment Heart Rate (beats/min) 111   Post-treatment Resp Rate (breaths/min) 16

## 2022-01-02 NOTE — ASSESSMENT & PLAN NOTE
Probably anemia of chronic blood loss.  Will monitor HGB.  Stable at the moment.    Lab Results   Component Value Date    HGB 7.3 (L) 01/01/2022

## 2022-01-02 NOTE — PROGRESS NOTES
[facility] Hospital Medicine  Progress Note    Patient Name: Patrizia Valadez  MRN: [id number]  Patient Class: IP- Inpatient   Admission Date: 12/26/2021  Length of Stay: 6 days  Attending Physician: Reyes Olmstaed MD  Primary Care Provider: Anya Farr NP        Subjective:     Principal Problem:Acute on chronic diastolic congestive heart failure        HPI:  Patrizia Valadez is a 75-year-old female with a past medical history of COPD, chronic respiratory failure on oxygen at home, lymphoma, and past surgical history of cholecystectomy who presented to the ED with a complaint of shortness of breath. HPI is limited due to acuity of condition. She was sent from a nursing some for decreased mental status as well as low oxygen saturation and respiratory distress. Per ED notes, EMS found the patient to have a pulse ox of 79% on arrival. She received a duoneb treatment, albuterol treatment and solumedrol 125 mg prior to arrival.     Upon arrival to the ED, the patient was found to be altered and confused, and in respiratory distress. She was placed on a non re breather mask, which improved her oxygenation to 98% but she did require non invasive ventilation. She was placed on BiPAP at 50%, 14/6, which has improved her respiratory status. She is more alert and awake, and is able to answer simple questions appropriately. Chest xray revealed bilateral pneumonia along with chronic findings. CBC revealed leukocytosis of 12.87 and what appears to be chronic anemia. Initial lactic acid level was elevated at 2.4, but has decreased to 1.5. Troponin negative, urine negative. BNP elevated at 1540. She was given one dose of Lasix 40 mg IV. Due to continued use of BiPAP and high acuity, she will be admitted to the ICU. The patient does state she is a DNR, review of medical record does show past recent admissions as a DNR. She does not have paper work with her today. Hospital Medicine consulted for admission and  further management.          Overview/Hospital Course:  Patrizia Valadez is a 75 year old female with a past medical history of COPD, chronic respiratory failure on oxygen at home, lymphoma, HANNAH, Afib, and CHF who presented to the ED with a complaint of shortness of breath. She was found to have an acute on chronic CHF exacerbation and is being treated with IV Lasix. She has required BiPAP therapy and was admitted to the ICU. She was placed empiric Zosyn as well for a possible superimposed pneumonia (WBC and procalcitonin elevated.) Pulmonary and Cardiology have also been consulted. Her course was complicated by Afib for which the patient is on a diltiazem infusion. Anticoagulation with Eliquis was held given a witnessed bloody bowel movement. GI has been consulted and will perform colonoscopy 12/29. She was diuresed roughly 2.2 L and she was able to be weaned off BiPAP to 2 L NC. Her course was complicated by JOSE likely secondary to over-diuresis. Lasix has held and a 500 cc LR bolus was ordered. PT/OT was consulted and the patient was transferred out of the ICU 12/29.      Interval History:   Anemia is controlled.  Continues with fatigue.   Less short of breath.  Continues with rapid atrial fibrillation and is off the IV diltiazem infusion.      Review of Systems   Constitutional: Positive for fatigue. Negative for chills and fever.   Respiratory: Positive for shortness of breath. Negative for cough.    Cardiovascular: Negative for chest pain and leg swelling.   Gastrointestinal: Negative for abdominal pain, nausea and vomiting.     Objective:     Vital Signs (Most Recent):  Temp: 97.5 °F (36.4 °C) (01/01/22 1930)  Pulse: 94 (01/01/22 1936)  Resp: 18 (01/01/22 1936)  BP: (!) 118/56 (01/01/22 1930)  SpO2: 98 % (01/01/22 1936) Vital Signs (24h Range):  Temp:  [96.2 °F (35.7 °C)-98.9 °F (37.2 °C)] 97.5 °F (36.4 °C)  Pulse:  [] 94  Resp:  [16-20] 18  SpO2:  [93 %-98 %] 98 %  BP: (112-130)/(50-64) 118/56     Weight:  51.5 kg (113 lb 8.6 oz)  Body mass index is 19.49 kg/m².    Intake/Output Summary (Last 24 hours) at 1/1/2022 2030  Last data filed at 1/1/2022 1700  Gross per 24 hour   Intake 1156.31 ml   Output 2850 ml   Net -1693.69 ml      Physical Exam  Vitals reviewed.   Constitutional:       General: She is not in acute distress.     Comments: Frail   Eyes:      General:         Right eye: No discharge.         Left eye: No discharge.   Neck:      Vascular: No JVD.   Cardiovascular:      Rate and Rhythm: Tachycardia present. Rhythm regularly irregular.   Pulmonary:      Effort: Pulmonary effort is normal.      Breath sounds: Normal breath sounds. Decreased air movement present.   Abdominal:      General: Bowel sounds are normal. There is no distension.      Palpations: Abdomen is soft.      Tenderness: There is no abdominal tenderness.   Skin:     General: Skin is warm.      Coloration: Skin is pale.      Findings: No rash.   Neurological:      Mental Status: She is alert.         Significant Labs: All pertinent labs within the past 24 hours have been reviewed.    Significant Imaging: I have reviewed all pertinent imaging results/findings within the past 24 hours.      Assessment/Plan:      * Acute on chronic diastolic congestive heart failure  Improved.  Continue IV loop diuretic.  BP too low for beta blocker or ACE inhibition.  Monitor volume status.  Follow cardiology's recommendations.    Hyperkalemia  Resolved.    Potassium   Date Value Ref Range Status   01/01/2022 3.6 3.5 - 5.1 mmol/L Final         JOSE (acute kidney injury)  Renal function is worsening.  Monitor renal labs.  Monitor UOP.    Lab Results   Component Value Date    CREATININE 1.7 (H) 01/01/2022         Colon cancer  Diagnosed during current admission via endoscopic exam.  Biopsies obtained.  Monitor for bleeding.  Patient chooses not to have treatment for it, neither surgical nor medical.  I consulted palliative care service for recommendations.  They  haven't seen her yet.    Chronic hypotension on midodrine  Controlled.  Monitor BP.  Continue midodrine.    Anemia due to chronic blood loss  Probably anemia of chronic blood loss.  Will monitor HGB.  Stable at the moment.    Lab Results   Component Value Date    HGB 7.3 (L) 01/01/2022         Paroxysmal atrial fibrillation  Uncontrolled.  Off diltiazem infusion.  On oral formulation.  Monitor heart rate.  Keep K within good range.  Follow cardiology's recommendations.    Multifocal atrial tachycardia        Acute on chronic respiratory failure with hypoxia and hypercapnia  Treat underlying causes, which is CHF exacerbation.  Supplement oxygen.  Monitor oximetry readings and ABG's as needed.  Follow pulmonology's recommendations.    COPD (chronic obstructive pulmonary disease)  Stable.  Continue bronchodilator treatments.  Follow pulmonology's recommendations.      VTE Risk Mitigation (From admission, onward)         Ordered     Place sequential compression device  Until discontinued         12/27/21 0302                Discharge Planning   VAZQUEZ: 1/3/2022     Code Status: DNR   Is the patient medically ready for discharge?:     Reason for patient still in hospital (select all that apply): Patient trending condition, Laboratory test and Treatment  Discharge Plan A: Return to nursing home                  Reyes Olmstead MD  Department of Hospital Medicine   [facility]

## 2022-01-02 NOTE — PROGRESS NOTES
The patient remains in atrial fib but the heart rate is reasonably controlled around 100. She is very inactive and asymptomatic from the atrial fib.  I would continue the present dose of Cardizem 90 mg t.i.d. and digoxin 0.125 mg daily.   Anticoagulation is contraindicated because of GI bleeding.  Okay for discharge from a cardiac point of view.

## 2022-01-03 ENCOUNTER — TELEPHONE (OUTPATIENT)
Dept: FAMILY MEDICINE | Facility: CLINIC | Age: 77
End: 2022-01-03
Payer: MEDICARE

## 2022-01-03 PROBLEM — I50.33 ACUTE ON CHRONIC DIASTOLIC CONGESTIVE HEART FAILURE: Status: RESOLVED | Noted: 2021-10-14 | Resolved: 2022-01-03

## 2022-01-03 LAB
ALBUMIN SERPL BCP-MCNC: 2.5 G/DL (ref 3.5–5.2)
ALP SERPL-CCNC: 52 U/L (ref 55–135)
ALT SERPL W/O P-5'-P-CCNC: 13 U/L (ref 10–44)
ANION GAP SERPL CALC-SCNC: 11 MMOL/L (ref 8–16)
AST SERPL-CCNC: 13 U/L (ref 10–40)
BASOPHILS # BLD AUTO: 0.03 K/UL (ref 0–0.2)
BASOPHILS NFR BLD: 0.3 % (ref 0–1.9)
BILIRUB SERPL-MCNC: 0.3 MG/DL (ref 0.1–1)
BUN SERPL-MCNC: 12 MG/DL (ref 8–23)
CALCIUM SERPL-MCNC: 9 MG/DL (ref 8.7–10.5)
CHLORIDE SERPL-SCNC: 90 MMOL/L (ref 95–110)
CO2 SERPL-SCNC: 38 MMOL/L (ref 23–29)
CREAT SERPL-MCNC: 1.1 MG/DL (ref 0.5–1.4)
DIFFERENTIAL METHOD: ABNORMAL
EOSINOPHIL # BLD AUTO: 0.6 K/UL (ref 0–0.5)
EOSINOPHIL NFR BLD: 5.6 % (ref 0–8)
ERYTHROCYTE [DISTWIDTH] IN BLOOD BY AUTOMATED COUNT: 14.9 % (ref 11.5–14.5)
EST. GFR  (AFRICAN AMERICAN): 56 ML/MIN/1.73 M^2
EST. GFR  (NON AFRICAN AMERICAN): 49 ML/MIN/1.73 M^2
GLUCOSE SERPL-MCNC: 125 MG/DL (ref 70–110)
HCT VFR BLD AUTO: 24.7 % (ref 37–48.5)
HGB BLD-MCNC: 7.1 G/DL (ref 12–16)
IMM GRANULOCYTES # BLD AUTO: 0.09 K/UL (ref 0–0.04)
IMM GRANULOCYTES NFR BLD AUTO: 0.8 % (ref 0–0.5)
LYMPHOCYTES # BLD AUTO: 1.7 K/UL (ref 1–4.8)
LYMPHOCYTES NFR BLD: 15.5 % (ref 18–48)
MAGNESIUM SERPL-MCNC: 2 MG/DL (ref 1.6–2.6)
MCH RBC QN AUTO: 27.1 PG (ref 27–31)
MCHC RBC AUTO-ENTMCNC: 28.7 G/DL (ref 32–36)
MCV RBC AUTO: 94 FL (ref 82–98)
MONOCYTES # BLD AUTO: 0.6 K/UL (ref 0.3–1)
MONOCYTES NFR BLD: 5.6 % (ref 4–15)
NEUTROPHILS # BLD AUTO: 7.7 K/UL (ref 1.8–7.7)
NEUTROPHILS NFR BLD: 72.2 % (ref 38–73)
NRBC BLD-RTO: 0 /100 WBC
PHOSPHATE SERPL-MCNC: 4.1 MG/DL (ref 2.7–4.5)
PLATELET # BLD AUTO: 388 K/UL (ref 150–450)
PMV BLD AUTO: 9.7 FL (ref 9.2–12.9)
POTASSIUM SERPL-SCNC: 4.6 MMOL/L (ref 3.5–5.1)
PROT SERPL-MCNC: 5.5 G/DL (ref 6–8.4)
RBC # BLD AUTO: 2.62 M/UL (ref 4–5.4)
SODIUM SERPL-SCNC: 139 MMOL/L (ref 136–145)
WBC # BLD AUTO: 10.69 K/UL (ref 3.9–12.7)

## 2022-01-03 PROCEDURE — 36415 COLL VENOUS BLD VENIPUNCTURE: CPT | Performed by: STUDENT IN AN ORGANIZED HEALTH CARE EDUCATION/TRAINING PROGRAM

## 2022-01-03 PROCEDURE — 11000001 HC ACUTE MED/SURG PRIVATE ROOM

## 2022-01-03 PROCEDURE — 94640 AIRWAY INHALATION TREATMENT: CPT

## 2022-01-03 PROCEDURE — 97110 THERAPEUTIC EXERCISES: CPT

## 2022-01-03 PROCEDURE — 25000003 PHARM REV CODE 250: Performed by: STUDENT IN AN ORGANIZED HEALTH CARE EDUCATION/TRAINING PROGRAM

## 2022-01-03 PROCEDURE — 63600175 PHARM REV CODE 636 W HCPCS: Performed by: STUDENT IN AN ORGANIZED HEALTH CARE EDUCATION/TRAINING PROGRAM

## 2022-01-03 PROCEDURE — 99233 SBSQ HOSP IP/OBS HIGH 50: CPT | Mod: ,,, | Performed by: INTERNAL MEDICINE

## 2022-01-03 PROCEDURE — 80053 COMPREHEN METABOLIC PANEL: CPT | Performed by: STUDENT IN AN ORGANIZED HEALTH CARE EDUCATION/TRAINING PROGRAM

## 2022-01-03 PROCEDURE — 25000242 PHARM REV CODE 250 ALT 637 W/ HCPCS: Performed by: STUDENT IN AN ORGANIZED HEALTH CARE EDUCATION/TRAINING PROGRAM

## 2022-01-03 PROCEDURE — 85025 COMPLETE CBC W/AUTO DIFF WBC: CPT | Performed by: STUDENT IN AN ORGANIZED HEALTH CARE EDUCATION/TRAINING PROGRAM

## 2022-01-03 PROCEDURE — 84100 ASSAY OF PHOSPHORUS: CPT | Performed by: STUDENT IN AN ORGANIZED HEALTH CARE EDUCATION/TRAINING PROGRAM

## 2022-01-03 PROCEDURE — 25000003 PHARM REV CODE 250: Performed by: INTERNAL MEDICINE

## 2022-01-03 PROCEDURE — C9113 INJ PANTOPRAZOLE SODIUM, VIA: HCPCS | Performed by: STUDENT IN AN ORGANIZED HEALTH CARE EDUCATION/TRAINING PROGRAM

## 2022-01-03 PROCEDURE — 94761 N-INVAS EAR/PLS OXIMETRY MLT: CPT

## 2022-01-03 PROCEDURE — 83735 ASSAY OF MAGNESIUM: CPT | Performed by: STUDENT IN AN ORGANIZED HEALTH CARE EDUCATION/TRAINING PROGRAM

## 2022-01-03 PROCEDURE — 27000221 HC OXYGEN, UP TO 24 HOURS

## 2022-01-03 PROCEDURE — 25000003 PHARM REV CODE 250: Performed by: HOSPITALIST

## 2022-01-03 PROCEDURE — 99233 PR SUBSEQUENT HOSPITAL CARE,LEVL III: ICD-10-PCS | Mod: ,,, | Performed by: INTERNAL MEDICINE

## 2022-01-03 PROCEDURE — 63600175 PHARM REV CODE 636 W HCPCS: Performed by: INTERNAL MEDICINE

## 2022-01-03 RX ORDER — FUROSEMIDE 40 MG/1
40 TABLET ORAL DAILY
Status: DISCONTINUED | OUTPATIENT
Start: 2022-01-04 | End: 2022-01-05 | Stop reason: HOSPADM

## 2022-01-03 RX ADMIN — FLUTICASONE FUROATE AND VILANTEROL TRIFENATATE 1 PUFF: 100; 25 POWDER RESPIRATORY (INHALATION) at 06:01

## 2022-01-03 RX ADMIN — DILTIAZEM HYDROCHLORIDE 90 MG: 60 TABLET, FILM COATED ORAL at 05:01

## 2022-01-03 RX ADMIN — DILTIAZEM HYDROCHLORIDE 90 MG: 60 TABLET, FILM COATED ORAL at 02:01

## 2022-01-03 RX ADMIN — MIDODRINE HYDROCHLORIDE 5 MG: 2.5 TABLET ORAL at 08:01

## 2022-01-03 RX ADMIN — IPRATROPIUM BROMIDE AND ALBUTEROL SULFATE 3 ML: 2.5; .5 SOLUTION RESPIRATORY (INHALATION) at 06:01

## 2022-01-03 RX ADMIN — IPRATROPIUM BROMIDE AND ALBUTEROL SULFATE 3 ML: 2.5; .5 SOLUTION RESPIRATORY (INHALATION) at 07:01

## 2022-01-03 RX ADMIN — IPRATROPIUM BROMIDE AND ALBUTEROL SULFATE 3 ML: 2.5; .5 SOLUTION RESPIRATORY (INHALATION) at 01:01

## 2022-01-03 RX ADMIN — DILTIAZEM HYDROCHLORIDE 90 MG: 60 TABLET, FILM COATED ORAL at 11:01

## 2022-01-03 RX ADMIN — Medication 1000 UNITS: at 08:01

## 2022-01-03 RX ADMIN — POTASSIUM & SODIUM PHOSPHATES POWDER PACK 280-160-250 MG 1 PACKET: 280-160-250 PACK at 08:01

## 2022-01-03 RX ADMIN — MIDODRINE HYDROCHLORIDE 5 MG: 2.5 TABLET ORAL at 02:01

## 2022-01-03 RX ADMIN — DIGOXIN 0.12 MG: 125 TABLET ORAL at 08:01

## 2022-01-03 RX ADMIN — MIRTAZAPINE 15 MG: 15 TABLET, FILM COATED ORAL at 08:01

## 2022-01-03 RX ADMIN — HYDROCODONE BITARTRATE AND ACETAMINOPHEN 2 TABLET: 10; 325 TABLET ORAL at 07:01

## 2022-01-03 RX ADMIN — CYANOCOBALAMIN TAB 1000 MCG 1000 MCG: 1000 TAB at 08:01

## 2022-01-03 RX ADMIN — POTASSIUM & SODIUM PHOSPHATES POWDER PACK 280-160-250 MG 1 PACKET: 280-160-250 PACK at 04:01

## 2022-01-03 RX ADMIN — FUROSEMIDE 40 MG: 10 INJECTION, SOLUTION INTRAMUSCULAR; INTRAVENOUS at 08:01

## 2022-01-03 RX ADMIN — LORAZEPAM 0.5 MG: 2 INJECTION INTRAMUSCULAR; INTRAVENOUS at 10:01

## 2022-01-03 RX ADMIN — LIDOCAINE 5% 1 PATCH: 700 PATCH TOPICAL at 02:01

## 2022-01-03 RX ADMIN — PANTOPRAZOLE SODIUM 40 MG: 40 INJECTION, POWDER, LYOPHILIZED, FOR SOLUTION INTRAVENOUS at 08:01

## 2022-01-03 RX ADMIN — POLYETHYLENE GLYCOL 3350 17 G: 17 POWDER, FOR SOLUTION ORAL at 08:01

## 2022-01-03 RX ADMIN — POTASSIUM & SODIUM PHOSPHATES POWDER PACK 280-160-250 MG 1 PACKET: 280-160-250 PACK at 12:01

## 2022-01-03 RX ADMIN — THERA TABS 1 TABLET: TAB at 08:01

## 2022-01-03 NOTE — ASSESSMENT & PLAN NOTE
Better controlled.  Off diltiazem infusion.  On oral formulation.  Monitor heart rate.  Keep K within good range.  Follow cardiology's recommendations.

## 2022-01-03 NOTE — ASSESSMENT & PLAN NOTE
Renal function has improved.  Monitor renal labs.  Monitor UOP.    Lab Results   Component Value Date    CREATININE 1.3 01/02/2022

## 2022-01-03 NOTE — PLAN OF CARE
Recommendation:  1) Change diet to Regular or Renal as desired   2) Will add Nepro BID and boost pudding BID  3) Weigh daily or as needed   4) If pt does not tolerate diet advancement consider supplemental nutrition support pending pt wishes     Goals: 1) PO intakes > 50% of meals and supplements at f/u  Nutrition Goal Status: new  Communication of RD Recs: reviewed with physician (POC, sticky note)

## 2022-01-03 NOTE — PT/OT/SLP PROGRESS
Physical Therapy Treatment    Patient Name:  Patrizia Valadez   MRN:  [id number]    Recommendations:     Discharge Recommendations: return to nursing facility, basic   Discharge Equipment Recommendations: none   Barriers to discharge: None    Assessment:     Patrizia Valadez is a 76 y.o. female admitted with a medical diagnosis of Acute on chronic diastolic congestive heart failure.  She presents with the following impairments/functional limitations:  weakness,impaired endurance,impaired self care skills,impaired functional mobilty,gait instability,impaired balance,decreased upper extremity function,decreased lower extremity function,decreased safety awareness,impaired cardiopulmonary response to activity. Patient is agreeable to participation with PT treatment. HR at rest ranging from  bpm. She requires Saundra for supine to sit transfer. She declines standing or ambulation despite encouragement. She performed 10-20 reps of LE therex while sitting EOB. She returned to bed with bed alarm on and RN notified.     Rehab Prognosis: Fair; patient would benefit from acute skilled PT services to address these deficits and reach maximum level of function.    Recent Surgery: Procedure(s) (LRB):  COLONOSCOPY (N/A) 5 Days Post-Op    Plan:     During this hospitalization, patient to be seen 5 x/week to address the identified rehab impairments via therapeutic activities,therapeutic exercises and progress toward the following goals:    · Plan of Care Expires:  01/28/22    Subjective     Chief Complaint: wants to build up muscle   Patient/Family Comments/goals: agrees to sit EOB   Pain/Comfort:  · Pain Rating 1: 0/10      Objective:     Communicated with FRANTZ Reagan prior to session.  Patient found HOB elevated with bed alarm,oxygen,peripheral IV,PureWick,telemetry upon PT entry to room.     General Precautions: Standard, fall,respiratory   Orthopedic Precautions:N/A   Braces: N/A  Respiratory Status: Nasal cannula, flow 3 L/min      Functional Mobility:  · Bed Mobility:     · Scooting: maximal assistance and of 2 persons  · Supine to Sit: minimum assistance  · Sit to Supine: stand by assistance      AM-PAC 6 CLICK MOBILITY  Turning over in bed (including adjusting bedclothes, sheets and blankets)?: 3  Sitting down on and standing up from a chair with arms (e.g., wheelchair, bedside commode, etc.): 2  Moving from lying on back to sitting on the side of the bed?: 3  Moving to and from a bed to a chair (including a wheelchair)?: 2  Need to walk in hospital room?: 2  Climbing 3-5 steps with a railing?: 1  Basic Mobility Total Score: 13       Therapeutic Activities and Exercises:   Patient was educated on the importance of OOB activity and functional mobility to negate negative effects of prolonged bed rest during hospitalization, safe transfers, and D/C planning     Patient performed 10-20 reps of bilateral LE therapeutic exercise including ankle pumps, long arc quads, seated marches, hip adduction with pillow, and hip abduction with manual resistance      Patient left HOB elevated with all lines intact, call button in reach, bed alarm on and RN notified..    GOALS:   Multidisciplinary Problems     Physical Therapy Goals        Problem: Physical Therapy Goal    Goal Priority Disciplines Outcome Goal Variances Interventions   Physical Therapy Goal     PT, PT/OT Ongoing, Progressing     Description: Goals to be met by: 22    Patient will increase functional independence with mobility by performin. Supine to sit with MInimal Assistance  2. Sit to supine with MInimal Assistance  3. Sit to stand transfer with Minimal Assistance  4. Bed to chair transfer with Minimal Assistance using Rolling Walker                     Time Tracking:     PT Received On: 22  PT Start Time: 1343     PT Stop Time: 1354  PT Total Time (min): 11 min     Billable Minutes: Therapeutic Exercise 11    Treatment Type: Treatment  PT/PTA: PT     PTA Visit Number:  0     01/03/2022

## 2022-01-03 NOTE — ASSESSMENT & PLAN NOTE
Probably anemia of chronic blood loss.  Will monitor HGB.  Stable at the moment.    Lab Results   Component Value Date    HGB 7.0 (L) 01/02/2022

## 2022-01-03 NOTE — TELEPHONE ENCOUNTER
----- Message from Desi Lorenzana sent at 1/3/2022  2:27 PM CST -----  Pt's daughter Daily Chatman calling said she has FMLA paperwork that she is needing to filled out by her mother's PCP to give to her employer to allow for time to care for her mother. She is not a patient here but will be faxing it to [phone] to the attention of Montse.    # [phone]

## 2022-01-03 NOTE — SUBJECTIVE & OBJECTIVE
Interval History:   Anemia is controlled.   Less short of breath.  The atrial fibrillation is more controlled.  Off of IV diltiazem. On oral diltiazem.    Review of Systems   Constitutional: Negative for chills, fatigue and fever.   Respiratory: Positive for shortness of breath. Negative for cough.    Cardiovascular: Negative for chest pain and leg swelling.   Gastrointestinal: Negative for abdominal pain, nausea and vomiting.     Objective:     Vital Signs (Most Recent):  Temp: 98.1 °F (36.7 °C) (01/02/22 1915)  Pulse: 95 (01/02/22 1927)  Resp: 18 (01/02/22 1927)  BP: 125/60 (01/02/22 1915)  SpO2: 95 % (01/02/22 1927) Vital Signs (24h Range):  Temp:  [97 °F (36.1 °C)-98.1 °F (36.7 °C)] 98.1 °F (36.7 °C)  Pulse:  [] 95  Resp:  [16-18] 18  SpO2:  [91 %-99 %] 95 %  BP: (107-125)/(51-64) 125/60     Weight: 50.7 kg (111 lb 12.4 oz)  Body mass index is 19.19 kg/m².    Intake/Output Summary (Last 24 hours) at 1/2/2022 2050  Last data filed at 1/2/2022 1600  Gross per 24 hour   Intake 240 ml   Output 2950 ml   Net -2710 ml      Physical Exam  Vitals reviewed.   Constitutional:       General: She is not in acute distress.     Comments: Frail   Eyes:      General:         Right eye: No discharge.         Left eye: No discharge.   Neck:      Vascular: No JVD.   Cardiovascular:      Rate and Rhythm: Tachycardia present. Rhythm regularly irregular.   Pulmonary:      Effort: Pulmonary effort is normal.      Breath sounds: Normal breath sounds. Decreased air movement present.   Abdominal:      General: Bowel sounds are normal. There is no distension.      Palpations: Abdomen is soft.      Tenderness: There is no abdominal tenderness.   Skin:     General: Skin is warm.      Coloration: Skin is pale.      Findings: No rash.   Neurological:      Mental Status: She is alert.         Significant Labs: All pertinent labs within the past 24 hours have been reviewed.    Significant Imaging: I have reviewed all pertinent imaging  results/findings within the past 24 hours.

## 2022-01-03 NOTE — ASSESSMENT & PLAN NOTE
Diagnosed during current admission via endoscopic exam.  Biopsies obtained.  Monitor for bleeding.  Patient chooses not to have treatment for it, neither surgical nor medical.  I consulted palliative care service for recommendations.  They haven't seen her yet.  I anticipate they'll see her tomorrow.  Possibly could be placed on hospice care at the nursing home where she resides.

## 2022-01-03 NOTE — CARE UPDATE
01/03/22 0700   Patient Assessment/Suction   Level of Consciousness (AVPU) alert   Respiratory Effort Normal;Unlabored   Expansion/Accessory Muscles/Retractions no use of accessory muscles   All Lung Fields Breath Sounds diminished   Rhythm/Pattern, Respiratory unlabored;pattern regular;depth regular   Cough Frequency no cough   PRE-TX-O2   O2 Device (Oxygen Therapy) nasal cannula   $ Is the patient on Low Flow Oxygen? Yes   Flow (L/min) 3   SpO2 98 %   Pulse Oximetry Type Intermittent   $ Pulse Oximetry - Multiple Charge Pulse Oximetry - Multiple   Pulse (!) 118   Resp 18   Aerosol Therapy   $ Aerosol Therapy Charges Aerosol Treatment   Respiratory Treatment Status (SVN) given   Treatment Route (SVN) mask   Patient Position (SVN) HOB elevated   Signs of Intolerance (SVN) none   Breath Sounds Post-Respiratory Treatment   Throughout All Fields Post-Treatment All Fields   Throughout All Fields Post-Treatment no change   Post-treatment Heart Rate (beats/min) 104   Post-treatment Resp Rate (breaths/min) 18

## 2022-01-03 NOTE — PLAN OF CARE
Patient alert and oriented this shift. VSS. No complaints. IV saline locked. Pt voiding via purwick. Incontinent of bowel. Barrier cream applied to sacrum. PT working with patient. CM working on d/c planning. Bed in lowest position, call light within reach.

## 2022-01-03 NOTE — NURSING
Pt complained of trouble breathing after breathing treatment. Pt o2 tez were 87 on 2 Liters of  Oxygen. HR 120s. Pt complained of pain on left side of her ribs. Put pt on 5 Liters of oxygen. Pt started to stat in the 90s. Respiratory and charge nurse notified. Monitored pt in room. Pt back on 3 liters with o2 sat of 100% . Pt appears sleep. NO use assessory muscles. expansion symmetrical

## 2022-01-03 NOTE — PLAN OF CARE
Pt is alert and oriented. No complaints of nausea during shift. Bed in lowest position. All alarms on. Scds assessed and removed, and reapplied. Weight shift provided. Pt voided.

## 2022-01-03 NOTE — ASSESSMENT & PLAN NOTE
Resolved.    Potassium   Date Value Ref Range Status   01/02/2022 3.3 (L) 3.5 - 5.1 mmol/L Final

## 2022-01-03 NOTE — PROGRESS NOTES
[facility] Hospital Medicine  Progress Note    Patient Name: Patrizia Valadez  MRN: [id number]  Patient Class: IP- Inpatient   Admission Date: 12/26/2021  Length of Stay: 7 days  Attending Physician: Reyes Olmstead MD  Primary Care Provider: Anya Farr NP        Subjective:     Principal Problem:Acute on chronic diastolic congestive heart failure        HPI:  Patrizia Valadez is a 75-year-old female with a past medical history of COPD, chronic respiratory failure on oxygen at home, lymphoma, and past surgical history of cholecystectomy who presented to the ED with a complaint of shortness of breath. HPI is limited due to acuity of condition. She was sent from a nursing some for decreased mental status as well as low oxygen saturation and respiratory distress. Per ED notes, EMS found the patient to have a pulse ox of 79% on arrival. She received a duoneb treatment, albuterol treatment and solumedrol 125 mg prior to arrival.     Upon arrival to the ED, the patient was found to be altered and confused, and in respiratory distress. She was placed on a non re breather mask, which improved her oxygenation to 98% but she did require non invasive ventilation. She was placed on BiPAP at 50%, 14/6, which has improved her respiratory status. She is more alert and awake, and is able to answer simple questions appropriately. Chest xray revealed bilateral pneumonia along with chronic findings. CBC revealed leukocytosis of 12.87 and what appears to be chronic anemia. Initial lactic acid level was elevated at 2.4, but has decreased to 1.5. Troponin negative, urine negative. BNP elevated at 1540. She was given one dose of Lasix 40 mg IV. Due to continued use of BiPAP and high acuity, she will be admitted to the ICU. The patient does state she is a DNR, review of medical record does show past recent admissions as a DNR. She does not have paper work with her today. Hospital Medicine consulted for admission and  further management.          Overview/Hospital Course:  Patrizia Valadez is a 75 year old female with a past medical history of COPD, chronic respiratory failure on oxygen at home, lymphoma, HANNAH, Afib, and CHF who presented to the ED with a complaint of shortness of breath. She was found to have an acute on chronic CHF exacerbation and is being treated with IV Lasix. She has required BiPAP therapy and was admitted to the ICU. She was placed empiric Zosyn as well for a possible superimposed pneumonia (WBC and procalcitonin elevated.) Pulmonary and Cardiology have also been consulted. Her course was complicated by Afib for which the patient is on a diltiazem infusion. Anticoagulation with Eliquis was held given a witnessed bloody bowel movement. GI has been consulted and will perform colonoscopy 12/29. She was diuresed roughly 2.2 L and she was able to be weaned off BiPAP to 2 L NC. Her course was complicated by JOSE likely secondary to over-diuresis. Lasix has held and a 500 cc LR bolus was ordered. PT/OT was consulted and the patient was transferred out of the ICU 12/29.      Interval History:   Anemia is controlled.   Less short of breath.  The atrial fibrillation is more controlled.  Off of IV diltiazem. On oral diltiazem.    Review of Systems   Constitutional: Negative for chills, fatigue and fever.   Respiratory: Positive for shortness of breath. Negative for cough.    Cardiovascular: Negative for chest pain and leg swelling.   Gastrointestinal: Negative for abdominal pain, nausea and vomiting.     Objective:     Vital Signs (Most Recent):  Temp: 98.1 °F (36.7 °C) (01/02/22 1915)  Pulse: 95 (01/02/22 1927)  Resp: 18 (01/02/22 1927)  BP: 125/60 (01/02/22 1915)  SpO2: 95 % (01/02/22 1927) Vital Signs (24h Range):  Temp:  [97 °F (36.1 °C)-98.1 °F (36.7 °C)] 98.1 °F (36.7 °C)  Pulse:  [] 95  Resp:  [16-18] 18  SpO2:  [91 %-99 %] 95 %  BP: (107-125)/(51-64) 125/60     Weight: 50.7 kg (111 lb 12.4 oz)  Body mass index  is 19.19 kg/m².    Intake/Output Summary (Last 24 hours) at 1/2/2022 2050  Last data filed at 1/2/2022 1600  Gross per 24 hour   Intake 240 ml   Output 2950 ml   Net -2710 ml      Physical Exam  Vitals reviewed.   Constitutional:       General: She is not in acute distress.     Comments: Frail   Eyes:      General:         Right eye: No discharge.         Left eye: No discharge.   Neck:      Vascular: No JVD.   Cardiovascular:      Rate and Rhythm: Tachycardia present. Rhythm regularly irregular.   Pulmonary:      Effort: Pulmonary effort is normal.      Breath sounds: Normal breath sounds. Decreased air movement present.   Abdominal:      General: Bowel sounds are normal. There is no distension.      Palpations: Abdomen is soft.      Tenderness: There is no abdominal tenderness.   Skin:     General: Skin is warm.      Coloration: Skin is pale.      Findings: No rash.   Neurological:      Mental Status: She is alert.         Significant Labs: All pertinent labs within the past 24 hours have been reviewed.    Significant Imaging: I have reviewed all pertinent imaging results/findings within the past 24 hours.      Assessment/Plan:      * Acute on chronic diastolic congestive heart failure  Improved.  Today is day 7 of IV loop diuretic.  BP too low for beta blocker or ACE inhibition.  Monitor volume status.  Follow cardiology's recommendations.    JOSE (acute kidney injury)  Renal function has improved.  Monitor renal labs.  Monitor UOP.    Lab Results   Component Value Date    CREATININE 1.3 01/02/2022         Colon cancer  Diagnosed during current admission via endoscopic exam.  Biopsies obtained.  Monitor for bleeding.  Patient chooses not to have treatment for it, neither surgical nor medical.  I consulted palliative care service for recommendations.  They haven't seen her yet.  I anticipate they'll see her tomorrow.  Possibly could be placed on hospice care at the nursing home where she resides.    Chronic  hypotension on midodrine  Controlled.  Monitor BP.  Continue midodrine.    Anemia due to chronic blood loss  Probably anemia of chronic blood loss.  Will monitor HGB.  Stable at the moment.    Lab Results   Component Value Date    HGB 7.0 (L) 01/02/2022         Paroxysmal atrial fibrillation  Better controlled.  Off diltiazem infusion.  On oral formulation.  Monitor heart rate.  Keep K within good range.  Follow cardiology's recommendations.    Multifocal atrial tachycardia        Acute on chronic respiratory failure with hypoxia and hypercapnia  Treat underlying causes, which is CHF exacerbation.  Supplement oxygen.  Monitor oximetry readings and ABG's as needed.  Follow pulmonology's recommendations.    COPD (chronic obstructive pulmonary disease)  Stable.  Continue bronchodilator treatments.  Follow pulmonology's recommendations.      VTE Risk Mitigation (From admission, onward)         Ordered     Place sequential compression device  Until discontinued         12/27/21 0302                Discharge Planning   VAZQUEZ: 1/3/2022     Code Status: DNR   Is the patient medically ready for discharge?:     Reason for patient still in hospital (select all that apply): Patient trending condition, Laboratory test and Treatment  Discharge Plan A: Return to nursing home                  Reyes Olmstead MD  Department of Hospital Medicine   [facility]

## 2022-01-03 NOTE — PROGRESS NOTES
Progress Note  PULMONARY    Admit Date: 12/26/2021 01/03/2022      SUBJECTIVE:     12/28- continues on bipap, tele with atrial fib. Hgb dropping to 7. Diuresed well with -2L yesterday. Feels better  12/29- resp status improved, on 2L NC. Diuresing well. Potassium low at 2.8 today  12/30- resp status doing well on 2L oxygen. She was found to have numerous colon polyps and mass on colonoscopy yesterday.  1/3- resp status is stable on 3L oxygen. Her HR is in the 90s-110s with atrial fib. She feels improved. Her daughter is at bedside.      OBJECTIVE:     Vitals (Most recent):  Vitals:    01/03/22 0819   BP: 127/60   Pulse: (!) 112   Resp: 18   Temp: 96.9 °F (36.1 °C)       Vitals (24 hour range):  Temp:  [96.9 °F (36.1 °C)-98.1 °F (36.7 °C)]   Pulse:  []   Resp:  [16-20]   BP: (113-127)/(53-64)   SpO2:  [94 %-100 %]       Intake/Output Summary (Last 24 hours) at 1/3/2022 1024  Last data filed at 1/3/2022 0000  Gross per 24 hour   Intake 356 ml   Output 2600 ml   Net -2244 ml          Physical Exam:  The patient's neuro status (alertness,orientation,cognitive function,motor skills,), pharyngeal exam (oral lesions, hygiene, abn dentition,), Neck (jvd,mass,thyroid,nodes in neck and above/below clavicle),RESPIRATORY(symmetry,effort,fremitus,percussion,auscultation),  Cor(rhythm,heart tones including gallops,perfusion,edema)ABD(distention,hepatic&splenomegaly,tenderness,masses), Skin(rash,cyanosis),Psyc(affect,judgement,).  Exam negative except for these pertinent findings:    Awake, alert, no distress  Bilateral diminished breath sounds   Site of thoracentesis right back with healing incision, bruise, no drainage  HR irregularly irreg, tachycardic  Abdomen soft, nontender  No edema        Radiographs reviewed: view by direct vision   CXR 12/30- improved R effusion s/p thoracentesis  CT chest 12/29/21- spiculated nodules rul and rll, bilateral moderate pleural effusions  CXR 12/29- improved pulm edema, continued  small pleural effusions bilaterally  CXR 12/26- bilat effusions, bilat basilar airspace disease with pulmonary vascular congestion, new compared to prior film 11/2021.  CT chest 8/3/21- emphysematous changes, hyperinflation, mild ggos in the bases     Labs     Results for FRACISCO HU (MRN [id number]) as of 1/3/2022 10:26   Ref. Range 12/30/2021 14:22   Fluid Color Unknown Yellow   Fluid Appearance Unknown Hazy   WBC, Body Fluid Latest Units: /cu mm 417   Body Fluid Type Unknown Pleural Fluid, Right   Segs, Fluid Latest Units: % 28   Lymphs, Fluid Latest Units: % 27   Monocytes/Macrophages, Fluid Latest Units: % 45   Glucose, Fluid Latest Ref Range: Not established mg/dL 108   LD, Fluid Latest Ref Range: Not established U/L 78   Body Fluid Source, Glucose Unknown Pleural Fluid, Right   Body Fluid Source, LDH Unknown Pleural Fluid, Right   Body Fluid Source, Total Protein Unknown Pleural Fluid, Right   Body Fluid, Protein Latest Ref Range: Not established g/dL 2.2     Recent Labs   Lab 01/03/22  0509   WBC 10.69   HGB 7.1*   HCT 24.7*        Recent Labs   Lab 01/03/22  0509      K 4.6   CL 90*   CO2 38*   BUN 12   CREATININE 1.1   *   CALCIUM 9.0   MG 2.0   PHOS 4.1   AST 13   ALT 13   ALKPHOS 52*   BILITOT 0.3   PROT 5.5*   ALBUMIN 2.5*   No results for input(s): PH, PCO2, PO2, HCO3 in the last 24 hours.  Microbiology Results (last 7 days)     Procedure Component Value Units Date/Time    Culture, Body Fluid (Aerobic) w/ GS [[id number]] Collected: 12/30/21 1422    Order Status: Completed Specimen: Body Fluid from Pleural Fluid Updated: 01/01/22 0900     AEROBIC CULTURE - FLUID No growth     Gram Stain Result No WBC's      No organisms seen    Blood culture x two cultures. Draw prior to antibiotics. [[id number]] Collected: 12/26/21 1902    Order Status: Completed Specimen: Blood from Peripheral, Right Updated: 01/01/22 0612     Blood Culture, Routine No growth after 5 days.    Narrative:      Aerobic  and anaerobic    Blood culture x two cultures. Draw prior to antibiotics. [[id number]]  (Abnormal)  (Susceptibility) Collected: 12/26/21 1910    Order Status: Completed Specimen: Blood Updated: 12/31/21 0903     Blood Culture, Routine Gram stain aer bottle: Gram negative rods      Results called to and read back by: Sam Catalan RN. 12/29/2021  21:00      KLEBSIELLA PNEUMONIAE    Narrative:      Aerobic and anaerobic          Impression:  Active Hospital Problems    Diagnosis  POA    *Acute on chronic diastolic congestive heart failure [I50.33]  Yes    Severe malnutrition [E43]  Yes    JOSE (acute kidney injury) [N17.9]  No    Colon cancer [C18.9]  Yes    Anemia due to chronic blood loss [D50.0]  Yes    Chronic hypotension on midodrine [I95.9]  Yes     Chronic    Paroxysmal atrial fibrillation [I48.0]  Yes    Acute on chronic respiratory failure with hypoxia and hypercapnia [J96.21, J96.22]  Yes    COPD (chronic obstructive pulmonary disease) [J44.9]  Yes     Chronic      Resolved Hospital Problems    Diagnosis Date Resolved POA    Hyperkalemia [E87.5] 01/02/2022 No    Hypokalemia [E87.6] 12/31/2021 Yes               Plan:     - continue supplemental O2 to keep sats 89-92%  - continue lasix 40mg daily- make PO  - transudative right pleural effusion- follow up cytology  - she declines further investigation into pulmonary nodules and if she has cancer would not like to receive any treatment other than palliative measures- discussed today  - continue breo inhaler  - continue nebulized bronchodilators- on dc please order duo nebs TID so she will receive on a schedule at her facility  - antibiotics have been deescalated  - cardiology following  - monitor Hgb; transfuse as needed to maintain >7  - eliquis held for GI bleeding  - ok for discharge      Miranda Cash MD  Pulmonary & Critical Care Medicine

## 2022-01-03 NOTE — CARE UPDATE
01/02/22 1927   Patient Assessment/Suction   Level of Consciousness (AVPU) alert   Respiratory Effort Normal;Unlabored   Expansion/Accessory Muscles/Retractions expansion symmetric;no retractions;no use of accessory muscles   All Lung Fields Breath Sounds clear   Cough Frequency no cough   PRE-TX-O2   O2 Device (Oxygen Therapy) nasal cannula   Flow (L/min) 2   Oxygen Concentration (%) 28   SpO2 95 %   Pulse Oximetry Type Intermittent   Pulse 95   Resp 18   Aerosol Therapy   $ Aerosol Therapy Charges Aerosol Treatment   Respiratory Treatment Status (SVN) given   Treatment Route (SVN) mask   Patient Position (SVN) HOB elevated   Signs of Intolerance (SVN) none   Breath Sounds Post-Respiratory Treatment   Throughout All Fields Post-Treatment All Fields   Throughout All Fields Post-Treatment no change   Post-treatment Heart Rate (beats/min) 94   Post-treatment Resp Rate (breaths/min) 18   Ready to Wean/Extubation Screen   FIO2<=50 (chart decimal) 0.28

## 2022-01-03 NOTE — PROGRESS NOTES
[facility]  Adult Nutrition  Progress Note     SUMMARY       Intervention: general helathful diet and nutrition supplement therapy      Recommendation:  1) Change diet to Regular or Renal as desired   2) Will add Boost plus BID and boost pudding BID  3) Weigh daily or as needed   4) If pt does not tolerate diet advancement consider supplemental nutrition support pending pt wishes     Goals: 1) PO intakes > 50% of meals and supplements at f/u  Nutrition Goal Status: new  Communication of RD Recs: reviewed with physician (NEMO, sticky note)     Assessment and Plan     Severe malnutrition  Contributing Nutrition Diagnosis  Severe chronic condition related malnutrition     Related to (etiology):   Decreased appetite     Signs and Symptoms (as evidenced by):   1) > 7.5% wt loss x 3 months  2) moderate-severe wasting as charted below     Interventions:  above     Nutrition Diagnosis Status:   new              Malnutrition Assessment  Skin (Micronutrient):  (Gabino = 14)  Teeth (Micronutrient):  (missing some)   Micronutrient Evaluation: suspected deficiency,suspected toxicity  Micronutrient Evaluation Comments: check K and iron, Phos elevated   Weight Loss (Malnutrition): greater than 7.5% in 3 months   Per RD assessment on 12/31/21  Orbital Region (Subcutaneous Fat Loss): mild depletion  Upper Arm Region (Subcutaneous Fat Loss): severe depletion   [place] Region (Muscle Loss): mild depletion  Clavicle Bone Region (Muscle Loss): severe depletion  Clavicle and Acromion Bone Region (Muscle Loss): moderate depletion  Dorsal Hand (Muscle Loss): moderate depletion  Patellar Region (Muscle Loss): severe depletion  Anterior Thigh Region (Muscle Loss): severe depletion  Posterior Calf Region (Muscle Loss): severe depletion   Edema (Fluid Accumulation): 2-->mild   Subcutaneous Fat Loss (Final Summary): severe protein-calorie malnutrition  Muscle Loss Evaluation (Final Summary): severe protein-calorie malnutrition  "      Reason for Assessment     Reason For Assessment: identified at risk by screening criteria  Diagnosis:  (acute CHF)  Relevant Medical History: erosive gastritis, COPD, HTN, CHF, lung CA  Interdisciplinary Rounds: did not attend (none today)     General Information Comments: 77 y/o female admitted with CHF and JOSE-improving. ? partial SBO and rectal bleed. NPO/clears x 4 days. Tells me she eats 2-3 meals/day at the nursing home on a heart healthy diet, but has not eaten a good meal since [place] day. Per GI no more bleeding, signing off and pt's diet advanced today ( did tolerate a few scopps of J-ello and sips of cranberry juice this morning). NFPE done 21 moderate-severe wasting seen. Significant wt loss per chart review ( ? % r/t fluid shifts with CHF).  1/3/21   Noted plan for palliative care to see.  Await plan of care.        Nutrition Discharge Planning: To be determined- Cardiac, low sodium diet and fluid restriction per MD + Boost pudding BID     Nutrition Risk Screen     Nutrition Risk Screen: no indicators present     Nutrition/Diet History     Patient Reported Diet/Restrictions/Preferences: heart healthy  Spiritual, Cultural Beliefs, Yazdanism Practices, Values that Affect Care: no  Food Allergies: NKFA  Factors Affecting Nutritional Intake: clear liquid diet,altered gastrointestinal function     Anthropometrics     Temp: 97.4 °F (36.3 °C)  Height Method: Measured (office 21)  Height: 5' 4"  Height (inches): 64 in  Weight Method: Bed Scale  Weight: 47.9 kg (105 lb 9.6 oz)  Weight (lb): 105.6 lb  Ideal Body Weight (IBW), Female: 120 lb  % Ideal Body Weight, Female (lb): 88 %  BMI (Calculated): 18.1  BMI Grade: 17 - 18.4 protein-energy malnutrition grade I  Usual Body Weight (UBW), k.8 kg (21)  % Usual Body Weight: 86.02  % Weight Change From Usual Weight: -14.16 %          Lab/Procedures/Meds     Pertinent Labs Reviewed: reviewed  BMP  Lab Results   Component Value Date    NA " 139 01/03/2022    K 4.6 01/03/2022    CL 90 (L) 01/03/2022    CO2 38 (H) 01/03/2022    BUN 12 01/03/2022    CREATININE 1.1 01/03/2022    CALCIUM 9.0 01/03/2022    ANIONGAP 11 01/03/2022    ESTGFRAFRICA 56 (A) 01/03/2022    EGFRNONAA 49 (A) 01/03/2022     No results found for: POCTGLUCOSE     Pertinent Medications Reviewed: reviewed  Pertinent Medications Comments: cyanocobalamin, ferrous sulpate, furosemide, MV, pantoprazole, polyethylene glycol, Vit D, potassium,/sodium phosphates packet     Estimated/Assessed Needs     Weight Used For Calorie Calculations: 47.9 kg (105 lb 9.6 oz)  Energy Calorie Requirements (kcal): MSJ ( x 1.4-1.5) = 8617-0545 kcal  Energy Need Method: [place]  Protein Requirements: 1.2-1.4 g protein/kg ( ae/wasting)  = 57-67 g  Weight Used For Protein Calculations: 47.9 kg (105 lb 9.6 oz)  Fluid Requirements (mL): 3420-8648 ml or per MD  Estimated Fluid Requirement Method: RDA Method  CHO Requirement: N/A     Nutrition Prescription Ordered     Current Diet Order: Full Liquid with boost plus BID     Evaluation of Received Nutrient/Fluid Intake     Energy Calories Required: not meeting needs  Protein Required: not meeting needs  Fluid Required: not meeting needs  Tolerance:  tolerating  % Intake of Estimated Energy Needs: 50%  % Meal Intake: %       Nutrition Risk     Level of Risk/Frequency of Follow-up: moderate 2 x weekly        Monitor and Evaluation     Food and Nutrient Intake: energy intake,food and beverage intake  Food and Nutrient Adminstration: diet order  Physical Activity and Function: nutrition-related ADLs and IADLs  Anthropometric Measurements: weight  Biochemical Data, Medical Tests and Procedures: electrolyte and renal panel  Nutrition-Focused Physical Findings: overall appearance,skin      Nutrition Follow-Up     RD Follow-up?: Yes

## 2022-01-03 NOTE — PROGRESS NOTES
Patient seen and examined.  Tolerating liquid diet.  Having bowel function.    Patient still does not want surgical intervention.  Okay for DC from my standpoint.  Should she desire colectomy, she may follow-up as an outpatient.

## 2022-01-03 NOTE — ASSESSMENT & PLAN NOTE
Improved.  Today is day 7 of IV loop diuretic.  BP too low for beta blocker or ACE inhibition.  Monitor volume status.  Follow cardiology's recommendations.

## 2022-01-04 PROBLEM — Z71.89 GOALS OF CARE, COUNSELING/DISCUSSION: Status: ACTIVE | Noted: 2022-01-04

## 2022-01-04 LAB
ALBUMIN SERPL BCP-MCNC: 2.5 G/DL (ref 3.5–5.2)
ALP SERPL-CCNC: 56 U/L (ref 55–135)
ALT SERPL W/O P-5'-P-CCNC: 13 U/L (ref 10–44)
ANION GAP SERPL CALC-SCNC: 10 MMOL/L (ref 8–16)
AST SERPL-CCNC: 12 U/L (ref 10–40)
BACTERIA FLD AEROBE CULT: NO GROWTH
BASOPHILS # BLD AUTO: 0.03 K/UL (ref 0–0.2)
BASOPHILS NFR BLD: 0.3 % (ref 0–1.9)
BILIRUB SERPL-MCNC: 0.3 MG/DL (ref 0.1–1)
BUN SERPL-MCNC: 13 MG/DL (ref 8–23)
CALCIUM SERPL-MCNC: 9.7 MG/DL (ref 8.7–10.5)
CHLORIDE SERPL-SCNC: 89 MMOL/L (ref 95–110)
CO2 SERPL-SCNC: 37 MMOL/L (ref 23–29)
CREAT SERPL-MCNC: 1 MG/DL (ref 0.5–1.4)
DIFFERENTIAL METHOD: ABNORMAL
EOSINOPHIL # BLD AUTO: 0.6 K/UL (ref 0–0.5)
EOSINOPHIL NFR BLD: 6 % (ref 0–8)
ERYTHROCYTE [DISTWIDTH] IN BLOOD BY AUTOMATED COUNT: 14.9 % (ref 11.5–14.5)
EST. GFR  (AFRICAN AMERICAN): >60 ML/MIN/1.73 M^2
EST. GFR  (NON AFRICAN AMERICAN): 55 ML/MIN/1.73 M^2
GLUCOSE SERPL-MCNC: 103 MG/DL (ref 70–110)
GRAM STN SPEC: NORMAL
GRAM STN SPEC: NORMAL
HCT VFR BLD AUTO: 25.5 % (ref 37–48.5)
HGB BLD-MCNC: 7.4 G/DL (ref 12–16)
IMM GRANULOCYTES # BLD AUTO: 0.06 K/UL (ref 0–0.04)
IMM GRANULOCYTES NFR BLD AUTO: 0.6 % (ref 0–0.5)
LYMPHOCYTES # BLD AUTO: 1.5 K/UL (ref 1–4.8)
LYMPHOCYTES NFR BLD: 15.3 % (ref 18–48)
MAGNESIUM SERPL-MCNC: 1.8 MG/DL (ref 1.6–2.6)
MCH RBC QN AUTO: 27.2 PG (ref 27–31)
MCHC RBC AUTO-ENTMCNC: 29 G/DL (ref 32–36)
MCV RBC AUTO: 94 FL (ref 82–98)
MONOCYTES # BLD AUTO: 0.5 K/UL (ref 0.3–1)
MONOCYTES NFR BLD: 5.7 % (ref 4–15)
NEUTROPHILS # BLD AUTO: 6.8 K/UL (ref 1.8–7.7)
NEUTROPHILS NFR BLD: 72.1 % (ref 38–73)
NRBC BLD-RTO: 0 /100 WBC
PHOSPHATE SERPL-MCNC: 4 MG/DL (ref 2.7–4.5)
PLATELET # BLD AUTO: 366 K/UL (ref 150–450)
PMV BLD AUTO: 8.7 FL (ref 9.2–12.9)
POCT GLUCOSE: 142 MG/DL (ref 70–110)
POTASSIUM SERPL-SCNC: 4 MMOL/L (ref 3.5–5.1)
PROT SERPL-MCNC: 5.6 G/DL (ref 6–8.4)
RBC # BLD AUTO: 2.72 M/UL (ref 4–5.4)
SODIUM SERPL-SCNC: 136 MMOL/L (ref 136–145)
WBC # BLD AUTO: 9.45 K/UL (ref 3.9–12.7)

## 2022-01-04 PROCEDURE — 84100 ASSAY OF PHOSPHORUS: CPT | Performed by: STUDENT IN AN ORGANIZED HEALTH CARE EDUCATION/TRAINING PROGRAM

## 2022-01-04 PROCEDURE — 63600175 PHARM REV CODE 636 W HCPCS: Performed by: STUDENT IN AN ORGANIZED HEALTH CARE EDUCATION/TRAINING PROGRAM

## 2022-01-04 PROCEDURE — 11000001 HC ACUTE MED/SURG PRIVATE ROOM

## 2022-01-04 PROCEDURE — 83735 ASSAY OF MAGNESIUM: CPT | Performed by: STUDENT IN AN ORGANIZED HEALTH CARE EDUCATION/TRAINING PROGRAM

## 2022-01-04 PROCEDURE — 80053 COMPREHEN METABOLIC PANEL: CPT | Performed by: STUDENT IN AN ORGANIZED HEALTH CARE EDUCATION/TRAINING PROGRAM

## 2022-01-04 PROCEDURE — 27000221 HC OXYGEN, UP TO 24 HOURS

## 2022-01-04 PROCEDURE — 25000003 PHARM REV CODE 250: Performed by: STUDENT IN AN ORGANIZED HEALTH CARE EDUCATION/TRAINING PROGRAM

## 2022-01-04 PROCEDURE — 25000003 PHARM REV CODE 250: Performed by: HOSPITALIST

## 2022-01-04 PROCEDURE — 25000242 PHARM REV CODE 250 ALT 637 W/ HCPCS: Performed by: STUDENT IN AN ORGANIZED HEALTH CARE EDUCATION/TRAINING PROGRAM

## 2022-01-04 PROCEDURE — 94640 AIRWAY INHALATION TREATMENT: CPT

## 2022-01-04 PROCEDURE — 36415 COLL VENOUS BLD VENIPUNCTURE: CPT | Performed by: STUDENT IN AN ORGANIZED HEALTH CARE EDUCATION/TRAINING PROGRAM

## 2022-01-04 PROCEDURE — 25000003 PHARM REV CODE 250: Performed by: INTERNAL MEDICINE

## 2022-01-04 PROCEDURE — 85025 COMPLETE CBC W/AUTO DIFF WBC: CPT | Performed by: STUDENT IN AN ORGANIZED HEALTH CARE EDUCATION/TRAINING PROGRAM

## 2022-01-04 PROCEDURE — 99231 PR SUBSEQUENT HOSPITAL CARE,LEVL I: ICD-10-PCS | Mod: ,,, | Performed by: INTERNAL MEDICINE

## 2022-01-04 PROCEDURE — C9113 INJ PANTOPRAZOLE SODIUM, VIA: HCPCS | Performed by: STUDENT IN AN ORGANIZED HEALTH CARE EDUCATION/TRAINING PROGRAM

## 2022-01-04 PROCEDURE — 94761 N-INVAS EAR/PLS OXIMETRY MLT: CPT

## 2022-01-04 PROCEDURE — 99231 SBSQ HOSP IP/OBS SF/LOW 25: CPT | Mod: ,,, | Performed by: INTERNAL MEDICINE

## 2022-01-04 RX ADMIN — DIGOXIN 0.12 MG: 125 TABLET ORAL at 10:01

## 2022-01-04 RX ADMIN — CYANOCOBALAMIN TAB 1000 MCG 1000 MCG: 1000 TAB at 10:01

## 2022-01-04 RX ADMIN — POTASSIUM & SODIUM PHOSPHATES POWDER PACK 280-160-250 MG 1 PACKET: 280-160-250 PACK at 06:01

## 2022-01-04 RX ADMIN — FLUTICASONE FUROATE AND VILANTEROL TRIFENATATE 1 PUFF: 100; 25 POWDER RESPIRATORY (INHALATION) at 10:01

## 2022-01-04 RX ADMIN — POTASSIUM & SODIUM PHOSPHATES POWDER PACK 280-160-250 MG 1 PACKET: 280-160-250 PACK at 08:01

## 2022-01-04 RX ADMIN — MIRTAZAPINE 15 MG: 15 TABLET, FILM COATED ORAL at 08:01

## 2022-01-04 RX ADMIN — FLUTICASONE FUROATE AND VILANTEROL TRIFENATATE 1 PUFF: 100; 25 POWDER RESPIRATORY (INHALATION) at 07:01

## 2022-01-04 RX ADMIN — Medication 1000 UNITS: at 10:01

## 2022-01-04 RX ADMIN — POTASSIUM & SODIUM PHOSPHATES POWDER PACK 280-160-250 MG 1 PACKET: 280-160-250 PACK at 02:01

## 2022-01-04 RX ADMIN — MIDODRINE HYDROCHLORIDE 5 MG: 2.5 TABLET ORAL at 10:01

## 2022-01-04 RX ADMIN — IPRATROPIUM BROMIDE AND ALBUTEROL SULFATE 3 ML: 2.5; .5 SOLUTION RESPIRATORY (INHALATION) at 06:01

## 2022-01-04 RX ADMIN — POLYETHYLENE GLYCOL 3350 17 G: 17 POWDER, FOR SOLUTION ORAL at 10:01

## 2022-01-04 RX ADMIN — POTASSIUM & SODIUM PHOSPHATES POWDER PACK 280-160-250 MG 1 PACKET: 280-160-250 PACK at 10:01

## 2022-01-04 RX ADMIN — IPRATROPIUM BROMIDE AND ALBUTEROL SULFATE 3 ML: 2.5; .5 SOLUTION RESPIRATORY (INHALATION) at 07:01

## 2022-01-04 RX ADMIN — Medication 1 EACH: at 10:01

## 2022-01-04 RX ADMIN — DILTIAZEM HYDROCHLORIDE 90 MG: 60 TABLET, FILM COATED ORAL at 11:01

## 2022-01-04 RX ADMIN — IPRATROPIUM BROMIDE AND ALBUTEROL SULFATE 3 ML: 2.5; .5 SOLUTION RESPIRATORY (INHALATION) at 12:01

## 2022-01-04 RX ADMIN — MIDODRINE HYDROCHLORIDE 5 MG: 2.5 TABLET ORAL at 02:01

## 2022-01-04 RX ADMIN — FUROSEMIDE 40 MG: 40 TABLET ORAL at 10:01

## 2022-01-04 RX ADMIN — DILTIAZEM HYDROCHLORIDE 90 MG: 60 TABLET, FILM COATED ORAL at 02:01

## 2022-01-04 RX ADMIN — IPRATROPIUM BROMIDE AND ALBUTEROL SULFATE 3 ML: 2.5; .5 SOLUTION RESPIRATORY (INHALATION) at 01:01

## 2022-01-04 RX ADMIN — HYDROCODONE BITARTRATE AND ACETAMINOPHEN 2 TABLET: 10; 325 TABLET ORAL at 08:01

## 2022-01-04 RX ADMIN — DILTIAZEM HYDROCHLORIDE 90 MG: 60 TABLET, FILM COATED ORAL at 05:01

## 2022-01-04 RX ADMIN — MIDODRINE HYDROCHLORIDE 5 MG: 2.5 TABLET ORAL at 08:01

## 2022-01-04 RX ADMIN — LIDOCAINE 5% 1 PATCH: 700 PATCH TOPICAL at 02:01

## 2022-01-04 RX ADMIN — PANTOPRAZOLE SODIUM 40 MG: 40 INJECTION, POWDER, LYOPHILIZED, FOR SOLUTION INTRAVENOUS at 10:01

## 2022-01-04 RX ADMIN — THERA TABS 1 TABLET: TAB at 10:01

## 2022-01-04 RX ADMIN — LORAZEPAM 0.5 MG: 2 INJECTION INTRAMUSCULAR; INTRAVENOUS at 11:01

## 2022-01-04 NOTE — CARE UPDATE
01/04/22 0748   Patient Assessment/Suction   Level of Consciousness (AVPU) alert   Respiratory Effort Normal   Expansion/Accessory Muscles/Retractions expansion symmetric   All Lung Fields Breath Sounds Anterior:   EDE Breath Sounds diminished   LLL Breath Sounds diminished   RUL Breath Sounds diminished   RML Breath Sounds diminished   RLL Breath Sounds diminished   Rhythm/Pattern, Respiratory pattern regular   Cough Frequency no cough   PRE-TX-O2   O2 Device (Oxygen Therapy) nasal cannula   $ Is the patient on Low Flow Oxygen? Yes   Flow (L/min) 2   Oxygen Concentration (%) 28   SpO2 95 %   Pulse Oximetry Type Intermittent   $ Pulse Oximetry - Multiple Charge Pulse Oximetry - Multiple   Pulse 85   Resp 18   Positioning HOB elevated 30 degrees   Aerosol Therapy   $ Aerosol Therapy Charges Aerosol Treatment   Respiratory Treatment Status (SVN) given   Treatment Route (SVN) mask   Patient Position (SVN) HOB elevated   Signs of Intolerance (SVN) none   POx, nebs Q6, DPI QD

## 2022-01-04 NOTE — PLAN OF CARE
Hospice consult, 3 day packet and current meds sent to [facility] [employer]. CM following.    Per Emelina with [facility] - they are willing to accept and able to do an informational visit at 1 pm- she left a message with the pts daughter . CM following.     01/04/22 1103   Post-Acute Status   Post-Acute Authorization Hospice   Hospice Status Referrals Sent

## 2022-01-04 NOTE — PROGRESS NOTES
Progress Note  PULMONARY    Admit Date: 12/26/2021 01/04/2022      SUBJECTIVE:     12/28- continues on bipap, tele with atrial fib. Hgb dropping to 7. Diuresed well with -2L yesterday. Feels better  12/29- resp status improved, on 2L NC. Diuresing well. Potassium low at 2.8 today  12/30- resp status doing well on 2L oxygen. She was found to have numerous colon polyps and mass on colonoscopy yesterday.  1/3- resp status is stable on 3L oxygen. Her HR is in the 90s-110s with atrial fib. She feels improved. Her daughter is at bedside.  1/4- resp status stable, no new commplaints      OBJECTIVE:     Vitals (Most recent):  Vitals:    01/04/22 0748   BP:    Pulse: 85   Resp: 18   Temp:        Vitals (24 hour range):  Temp:  [96.3 °F (35.7 °C)-98.3 °F (36.8 °C)]   Pulse:  []   Resp:  [16-20]   BP: (116-135)/(55-61)   SpO2:  [94 %-100 %]       Intake/Output Summary (Last 24 hours) at 1/4/2022 0843  Last data filed at 1/4/2022 0729  Gross per 24 hour   Intake 360 ml   Output 1950 ml   Net -1590 ml          Physical Exam:  The patient's neuro status (alertness,orientation,cognitive function,motor skills,), pharyngeal exam (oral lesions, hygiene, abn dentition,), Neck (jvd,mass,thyroid,nodes in neck and above/below clavicle),RESPIRATORY(symmetry,effort,fremitus,percussion,auscultation),  Cor(rhythm,heart tones including gallops,perfusion,edema)ABD(distention,hepatic&splenomegaly,tenderness,masses), Skin(rash,cyanosis),Psyc(affect,judgement,).  Exam negative except for these pertinent findings:    Awake, alert, no distress  Bilateral diminished breath sounds   HR irregularly irreg  Abdomen soft, nontender  No edema        Radiographs reviewed: view by direct vision   CXR 12/30- improved R effusion s/p thoracentesis  CT chest 12/29/21- spiculated nodules rul and rll, bilateral moderate pleural effusions  CXR 12/29- improved pulm edema, continued small pleural effusions bilaterally  CXR 12/26- bilat effusions, bilat  basilar airspace disease with pulmonary vascular congestion, new compared to prior film 11/2021.  CT chest 8/3/21- emphysematous changes, hyperinflation, mild ggos in the bases     Labs     Results for FRACISCO HU (MRN [id number]) as of 1/3/2022 10:26   Ref. Range 12/30/2021 14:22   Fluid Color Unknown Yellow   Fluid Appearance Unknown Hazy   WBC, Body Fluid Latest Units: /cu mm 417   Body Fluid Type Unknown Pleural Fluid, Right   Segs, Fluid Latest Units: % 28   Lymphs, Fluid Latest Units: % 27   Monocytes/Macrophages, Fluid Latest Units: % 45   Glucose, Fluid Latest Ref Range: Not established mg/dL 108   LD, Fluid Latest Ref Range: Not established U/L 78   Body Fluid Source, Glucose Unknown Pleural Fluid, Right   Body Fluid Source, LDH Unknown Pleural Fluid, Right   Body Fluid Source, Total Protein Unknown Pleural Fluid, Right   Body Fluid, Protein Latest Ref Range: Not established g/dL 2.2     Recent Labs   Lab 01/04/22  0531   WBC 9.45   HGB 7.4*   HCT 25.5*        Recent Labs   Lab 01/04/22  0531      K 4.0   CL 89*   CO2 37*   BUN 13   CREATININE 1.0      CALCIUM 9.7   MG 1.8   PHOS 4.0   AST 12   ALT 13   ALKPHOS 56   BILITOT 0.3   PROT 5.6*   ALBUMIN 2.5*   No results for input(s): PH, PCO2, PO2, HCO3 in the last 24 hours.  Microbiology Results (last 7 days)     Procedure Component Value Units Date/Time    Culture, Body Fluid (Aerobic) w/ GS [[id number]] Collected: 12/30/21 1422    Order Status: Completed Specimen: Body Fluid from Pleural Fluid Updated: 01/03/22 1424     AEROBIC CULTURE - FLUID No growth     Gram Stain Result No WBC's      No organisms seen    Blood culture x two cultures. Draw prior to antibiotics. [[id number]] Collected: 12/26/21 1902    Order Status: Completed Specimen: Blood from Peripheral, Right Updated: 01/01/22 0612     Blood Culture, Routine No growth after 5 days.    Narrative:      Aerobic and anaerobic    Blood culture x two cultures. Draw prior to antibiotics.  [[id number]]  (Abnormal)  (Susceptibility) Collected: 12/26/21 1910    Order Status: Completed Specimen: Blood Updated: 12/31/21 0903     Blood Culture, Routine Gram stain aer bottle: Gram negative rods      Results called to and read back by: Sam Catalan RN. 12/29/2021  21:00      KLEBSIELLA PNEUMONIAE    Narrative:      Aerobic and anaerobic          Impression:  Active Hospital Problems    Diagnosis  POA    *Colon cancer [C18.9]  Yes    Severe malnutrition [E43]  Yes    JOSE (acute kidney injury) [N17.9]  No    Anemia due to chronic blood loss [D50.0]  Yes    Chronic hypotension on midodrine [I95.9]  Yes     Chronic    Chronic heart failure with preserved ejection fraction [I50.42]  Yes     Chronic    Paroxysmal atrial fibrillation [I48.0]  Yes    Acute on chronic respiratory failure with hypoxia and hypercapnia [J96.21, J96.22]  Yes    COPD (chronic obstructive pulmonary disease) [J44.9]  Yes     Chronic      Resolved Hospital Problems    Diagnosis Date Resolved POA    Hyperkalemia [E87.5] 01/02/2022 No    Hypokalemia [E87.6] 12/31/2021 Yes    Acute on chronic diastolic congestive heart failure [I50.33] 01/03/2022 Yes               Plan:     - continue supplemental O2 to keep sats 89-92%  - continue lasix 40mg PO daily  - transudative right pleural effusion- follow up cytology- still pending  - she declines further investigation into pulmonary nodules and if she has cancer would not like to receive any treatment other than palliative measures- refer for outpatient follow up if she changes her mind  - continue breo inhaler (trelegy on dc)  - continue nebulized bronchodilators- on dc please order duo nebs TID so she will receive on a schedule at her facility  - antibiotics have been deescalated  - cardiology following  - monitor Hgb; transfuse as needed to maintain >7  - eliquis held for GI bleeding  - ok for discharge  - pulmonary will sign off at this time; please call if needed    Miranda Cash  MD  Pulmonary & Critical Care Medicine

## 2022-01-04 NOTE — PLAN OF CARE
I spoke to Elda with [facility] [phone] and asked someone to call me with a status update on how the informational visit went. CM following.    01/04/22 1614   Post-Acute Status   Post-Acute Authorization Hospice   Hospice Status Referrals Sent

## 2022-01-04 NOTE — ASSESSMENT & PLAN NOTE
Probably anemia of chronic blood loss.  Will monitor HGB.  Stable at the moment.    Lab Results   Component Value Date    HGB 7.1 (L) 01/03/2022

## 2022-01-04 NOTE — SUBJECTIVE & OBJECTIVE
Interval History:   Anemia is controlled.   The afib is controlled.  Less short of breath; remains on supplemental oxygen. No new complaints.    Review of Systems   Constitutional: Negative for chills, fatigue and fever.   Respiratory: Positive for shortness of breath. Negative for cough.    Cardiovascular: Negative for chest pain and leg swelling.   Gastrointestinal: Negative for abdominal pain, nausea and vomiting.     Objective:     Vital Signs (Most Recent):  Temp: 98.3 °F (36.8 °C) (01/03/22 1934)  Pulse: 98 (01/03/22 1934)  Resp: 17 (01/03/22 1958)  BP: (!) 126/58 (01/03/22 1934)  SpO2: 100 % (01/03/22 1934) Vital Signs (24h Range):  Temp:  [96.9 °F (36.1 °C)-98.3 °F (36.8 °C)] 98.3 °F (36.8 °C)  Pulse:  [] 98  Resp:  [16-20] 17  SpO2:  [94 %-100 %] 100 %  BP: (113-127)/(53-60) 126/58     Weight: 50.7 kg (111 lb 12.4 oz)  Body mass index is 19.19 kg/m².    Intake/Output Summary (Last 24 hours) at 1/3/2022 2151  Last data filed at 1/3/2022 1758  Gross per 24 hour   Intake 596 ml   Output 1450 ml   Net -854 ml      Physical Exam  Vitals reviewed.   Constitutional:       General: She is not in acute distress.     Comments: Frail   Eyes:      General:         Right eye: No discharge.         Left eye: No discharge.   Neck:      Vascular: No JVD.   Cardiovascular:      Rate and Rhythm: Tachycardia present. Rhythm regularly irregular.   Pulmonary:      Effort: Pulmonary effort is normal.      Breath sounds: Normal breath sounds. Decreased air movement present.   Abdominal:      General: Bowel sounds are normal. There is no distension.      Palpations: Abdomen is soft.      Tenderness: There is no abdominal tenderness.   Skin:     General: Skin is warm.      Coloration: Skin is pale.      Findings: No rash.   Neurological:      Mental Status: She is alert.         Significant Labs: All pertinent labs within the past 24 hours have been reviewed.    Significant Imaging: I have reviewed all pertinent imaging  results/findings within the past 24 hours.

## 2022-01-04 NOTE — RESPIRATORY THERAPY
01/03/22 1929   Patient Assessment/Suction   Level of Consciousness (AVPU) alert   Respiratory Effort Normal;Unlabored   Expansion/Accessory Muscles/Retractions no use of accessory muscles   All Lung Fields Breath Sounds diminished   Rhythm/Pattern, Respiratory unlabored;pattern regular   Cough Frequency infrequent   Cough Type nonproductive;fair   PRE-TX-O2   O2 Device (Oxygen Therapy) nasal cannula   Flow (L/min) 3   Oxygen Concentration (%) 32   SpO2 98 %   Pulse Oximetry Type Intermittent   $ Pulse Oximetry - Multiple Charge Pulse Oximetry - Multiple   Pulse 89   Resp 20   Positioning HOB elevated 30 degrees   Aerosol Therapy   $ Aerosol Therapy Charges Aerosol Treatment   Respiratory Treatment Status (SVN) given   Treatment Route (SVN) mask;oxygen   Patient Position (SVN) HOB elevated   Post Treatment Assessment (SVN) breath sounds unchanged   Signs of Intolerance (SVN) none   Breath Sounds Post-Respiratory Treatment   Throughout All Fields Post-Treatment All Fields   Throughout All Fields Post-Treatment no change   Post-treatment Heart Rate (beats/min) 98   Post-treatment Resp Rate (breaths/min) 20   Ready to Wean/Extubation Screen   FIO2<=50 (chart decimal) 0.32

## 2022-01-04 NOTE — PT/OT/SLP PROGRESS
"Physical Therapy      Patient Name:  Patrizia Valadez   MRN:  [id number]    Patient not seen today.   Initial attempt at 11:10, pt presents with rapid resp rate and reports, "I'm having a panic attack and I need my anxiety medicine right away." Therapist communicated need with nurse Keyes.  Second attempt at 13:25, pt declines therapy due to "I just got some bad news about me dying, and I am just not able to do anything with you today."    Physical therapy will follow up with pt tomorrow.       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     Interval Events: On tube feeds, tolerating it, CT reviewed with Radiology , wall not thick enough for AXIOS stent.       Allergies:  Valproate Sodium (Other (Severe))      Hospital Medications:  acetaminophen    Suspension .. 650 milliGRAM(s) Oral every 6 hours PRN  cefTAZidime/avibactam IVPB 2.5 Gram(s) IV Intermittent every 8 hours  cefTAZidime/avibactam IVPB      chlorhexidine 4% Liquid 1 Application(s) Topical <User Schedule>  cyanocobalamin 1000 MICROGram(s) Oral daily  dextrose 40% Gel 15 Gram(s) Oral once PRN  dextrose 5%. 1000 milliLiter(s) IV Continuous <Continuous>  dextrose 50% Injectable 12.5 Gram(s) IV Push once  dextrose 50% Injectable 25 Gram(s) IV Push once  dextrose 50% Injectable 25 Gram(s) IV Push once  folic acid 1 milliGRAM(s) Enteral Tube daily  glucagon  Injectable 1 milliGRAM(s) IntraMuscular once PRN  heparin  Injectable 5000 Unit(s) SubCutaneous every 8 hours  hydrocortisone sodium succinate Injectable 10 milliGRAM(s) IV Push every 8 hours  insulin lispro (HumaLOG) corrective regimen sliding scale   SubCutaneous every 6 hours  lacosamide Solution 100 milliGRAM(s) Oral two times a day  metroNIDAZOLE  IVPB      metroNIDAZOLE  IVPB 500 milliGRAM(s) IV Intermittent every 8 hours  midodrine 5 milliGRAM(s) Oral three times a day  QUEtiapine 25 milliGRAM(s) Oral daily  risperiDONE   Solution 1 milliGRAM(s) Oral daily      PMHX/PSHX:  Seizure  TBI (traumatic brain injury)  S/P percutaneous endoscopic gastrostomy (PEG) tube placement  No significant past surgical history      Family history:      ROS:   Looks comfortable, on trach and vent.       PHYSICAL EXAM:     GENERAL:  Appears stated age, no distress  HEENT:  NC/AT,  conjunctivae clear, sclera -anicteric  CHEST:  Full & symmetric excursion, no increased effort, breath sounds clear  HEART:  Regular rhythm, S1, S2, no added sounds  ABDOMEN:  Soft, non-tender, non-distended, normoactive bowel sounds.  NEURO:  Alert, oriented    Vital Signs:  Vital Signs Last 24 Hrs  T(C): 37.1 (22 Jan 2019 09:31), Max: 37.1 (22 Jan 2019 09:31)  T(F): 98.8 (22 Jan 2019 09:31), Max: 98.8 (22 Jan 2019 09:31)  HR: 114 (22 Jan 2019 09:31) (88 - 114)  BP: 116/78 (22 Jan 2019 09:31) (108/77 - 132/80)  BP(mean): --  RR: 20 (22 Jan 2019 09:31) (16 - 20)  SpO2: 96% (22 Jan 2019 09:31) (96% - 99%)  Daily     Daily     LABS:                        9.5    4.20  )-----------( 281      ( 22 Jan 2019 06:11 )             31.6     01-22    139  |  104  |  10  ----------------------------<  123<H>  3.7   |  30  |  < 0.20<L>    Ca    8.0<L>      22 Jan 2019 06:11  Phos  2.8     01-22  Mg     1.9     01-22                Imaging:  < from: CT Abdomen and Pelvis w/ IV Cont (01.17.19 @ 12:37) >  Unchanged necrotizing pancreatitis with mild decrease in acute necrotic   collections.  Decreased amount of ascites.  Unchanged bilateral pleural effusions and atelectasis.  Unchanged colitis involving the distal transverse descending colon.      < end of copied text >

## 2022-01-04 NOTE — PLAN OF CARE
Poc reviewed with pt. Pt verbalizes understanding. Frequent rounding Q 2 hours. Nausea and pain controlled with prn meds, call light within reach.pt is on palliative care and a DNR

## 2022-01-04 NOTE — PROGRESS NOTES
[facility] Hospital Medicine  Progress Note    Patient Name: Patrizia Valadez  MRN: [id number]  Patient Class: IP- Inpatient   Admission Date: 12/26/2021  Length of Stay: 8 days  Attending Physician: Reyes Olmstead MD  Primary Care Provider: Anya Farr NP        Subjective:     Principal Problem:Acute on chronic diastolic congestive heart failure        HPI:  Patrizia Valadez is a 75-year-old female with a past medical history of COPD, chronic respiratory failure on oxygen at home, lymphoma, and past surgical history of cholecystectomy who presented to the ED with a complaint of shortness of breath. HPI is limited due to acuity of condition. She was sent from a nursing some for decreased mental status as well as low oxygen saturation and respiratory distress. Per ED notes, EMS found the patient to have a pulse ox of 79% on arrival. She received a duoneb treatment, albuterol treatment and solumedrol 125 mg prior to arrival.     Upon arrival to the ED, the patient was found to be altered and confused, and in respiratory distress. She was placed on a non re breather mask, which improved her oxygenation to 98% but she did require non invasive ventilation. She was placed on BiPAP at 50%, 14/6, which has improved her respiratory status. She is more alert and awake, and is able to answer simple questions appropriately. Chest xray revealed bilateral pneumonia along with chronic findings. CBC revealed leukocytosis of 12.87 and what appears to be chronic anemia. Initial lactic acid level was elevated at 2.4, but has decreased to 1.5. Troponin negative, urine negative. BNP elevated at 1540. She was given one dose of Lasix 40 mg IV. Due to continued use of BiPAP and high acuity, she will be admitted to the ICU. The patient does state she is a DNR, review of medical record does show past recent admissions as a DNR. She does not have paper work with her today. Hospital Medicine consulted for admission and  further management.          Overview/Hospital Course:  Patrizia Valadez is a 75 year old female with a past medical history of COPD, chronic respiratory failure on oxygen at home, lymphoma, HANNAH, Afib, and CHF who presented to the ED with a complaint of shortness of breath. She was found to have an acute on chronic CHF exacerbation and is being treated with IV Lasix. She has required BiPAP therapy and was admitted to the ICU. She was placed empiric Zosyn as well for a possible superimposed pneumonia (WBC and procalcitonin elevated.) Pulmonary and Cardiology have also been consulted. Her course was complicated by Afib for which the patient is on a diltiazem infusion. Anticoagulation with Eliquis was held given a witnessed bloody bowel movement. GI has been consulted and will perform colonoscopy 12/29. She was diuresed roughly 2.2 L and she was able to be weaned off BiPAP to 2 L NC. Her course was complicated by JOSE likely secondary to over-diuresis. Lasix has held and a 500 cc LR bolus was ordered. PT/OT was consulted and the patient was transferred out of the ICU 12/29.      Interval History:   Anemia is controlled.   The afib is controlled.  Less short of breath; remains on supplemental oxygen. No new complaints.    Review of Systems   Constitutional: Negative for chills, fatigue and fever.   Respiratory: Positive for shortness of breath. Negative for cough.    Cardiovascular: Negative for chest pain and leg swelling.   Gastrointestinal: Negative for abdominal pain, nausea and vomiting.     Objective:     Vital Signs (Most Recent):  Temp: 98.3 °F (36.8 °C) (01/03/22 1934)  Pulse: 98 (01/03/22 1934)  Resp: 17 (01/03/22 1958)  BP: (!) 126/58 (01/03/22 1934)  SpO2: 100 % (01/03/22 1934) Vital Signs (24h Range):  Temp:  [96.9 °F (36.1 °C)-98.3 °F (36.8 °C)] 98.3 °F (36.8 °C)  Pulse:  [] 98  Resp:  [16-20] 17  SpO2:  [94 %-100 %] 100 %  BP: (113-127)/(53-60) 126/58     Weight: 50.7 kg (111 lb 12.4 oz)  Body mass index  is 19.19 kg/m².    Intake/Output Summary (Last 24 hours) at 1/3/2022 2151  Last data filed at 1/3/2022 1758  Gross per 24 hour   Intake 596 ml   Output 1450 ml   Net -854 ml      Physical Exam  Vitals reviewed.   Constitutional:       General: She is not in acute distress.     Comments: Frail   Eyes:      General:         Right eye: No discharge.         Left eye: No discharge.   Neck:      Vascular: No JVD.   Cardiovascular:      Rate and Rhythm: Tachycardia present. Rhythm regularly irregular.   Pulmonary:      Effort: Pulmonary effort is normal.      Breath sounds: Normal breath sounds. Decreased air movement present.   Abdominal:      General: Bowel sounds are normal. There is no distension.      Palpations: Abdomen is soft.      Tenderness: There is no abdominal tenderness.   Skin:     General: Skin is warm.      Coloration: Skin is pale.      Findings: No rash.   Neurological:      Mental Status: She is alert.         Significant Labs: All pertinent labs within the past 24 hours have been reviewed.    Significant Imaging: I have reviewed all pertinent imaging results/findings within the past 24 hours.      Assessment/Plan:      * Acute on chronic diastolic congestive heart failure  Resolved.  Furosemide is now oral formulation and daily.  BP too low for beta blocker or ACE inhibition.  Monitor volume status.  Follow cardiology's recommendations.    JOSE (acute kidney injury)  JOSE has resolved.    Lab Results   Component Value Date    CREATININE 1.1 01/03/2022         Colon cancer  Diagnosed during current admission via endoscopic exam.  Biopsies obtained.  Monitor for bleeding.  Patient chooses not to have treatment for it, neither surgical nor medical.  I consulted palliative care service for recommendations.  They haven't seen her yet.  I anticipate they'll see her tomorrow.  Possibly could be placed on hospice care at the nursing home where she resides.    Chronic hypotension on  midodrine  Controlled.  Monitor BP.  Continue midodrine.    Anemia due to chronic blood loss  Probably anemia of chronic blood loss.  Will monitor HGB.  Stable at the moment.    Lab Results   Component Value Date    HGB 7.1 (L) 01/03/2022         Paroxysmal atrial fibrillation  Better controlled.  Off diltiazem infusion.  On oral formulation.  Monitor heart rate.  Keep K within good range.  Follow cardiology's recommendations.    Multifocal atrial tachycardia        Acute on chronic respiratory failure with hypoxia and hypercapnia  Treat underlying causes, which is CHF exacerbation.  Supplement oxygen.  Monitor oximetry readings and ABG's as needed.  Follow pulmonology's recommendations.    COPD (chronic obstructive pulmonary disease)  Stable.  Continue bronchodilator treatments.  Follow pulmonology's recommendations.      VTE Risk Mitigation (From admission, onward)         Ordered     Place sequential compression device  Until discontinued         12/27/21 0302                Discharge Planning   VAZQUEZ: 1/3/2022     Code Status: DNR   Is the patient medically ready for discharge?:     Reason for patient still in hospital (select all that apply): Patient trending condition and Consult recommendations  Discharge Plan A: Return to nursing home                  Reyes Olmstead MD  Department of Hospital Medicine   [facility]

## 2022-01-04 NOTE — CHAPLAIN
"Visited palliative care pt, have visited her in the past. Pt was awake, alert, shared with me that she has cancer, "it is was it is, we all have to die of something." Pt was a little teary-- said she's ok with it all, but if she thinks about it too much, she gets emotional. Provided compassionate presence and listening ear and normalized her feelings with her. She said she's not in pain and "not suffering", says she "wants treatment for it, if they think it will help."     Pt said she "doesn't want a tube down her throat." I asked if she has all that in writing-- ACP and she said she thinks so, said her daughter will be here later today. I offered to bring her a living will packet that she can discuss with her daughter. She agreed. Reminded her that it's important that her daughter know her wishes if/when she can't speak for herself.     Pt wanted to rest so I "tucked her in" and said I'd bring those documents to her. Pt thanked me. Lord, in your mercy.    NOTE: palliative care nurse is out this week and Dr. CHAN palliative doc, will be here tomorrow 1/5/22      "

## 2022-01-04 NOTE — ASSESSMENT & PLAN NOTE
Resolved.  Furosemide is now oral formulation and daily.  BP too low for beta blocker or ACE inhibition.  Monitor volume status.  Follow cardiology's recommendations.

## 2022-01-05 VITALS
TEMPERATURE: 98 F | DIASTOLIC BLOOD PRESSURE: 60 MMHG | OXYGEN SATURATION: 97 % | SYSTOLIC BLOOD PRESSURE: 142 MMHG | HEART RATE: 97 BPM | BODY MASS INDEX: 19.08 KG/M2 | RESPIRATION RATE: 20 BRPM | HEIGHT: 64 IN | WEIGHT: 111.75 LBS

## 2022-01-05 LAB
ALBUMIN SERPL BCP-MCNC: 2.4 G/DL (ref 3.5–5.2)
ALP SERPL-CCNC: 63 U/L (ref 55–135)
ALT SERPL W/O P-5'-P-CCNC: 9 U/L (ref 10–44)
ANION GAP SERPL CALC-SCNC: 12 MMOL/L (ref 8–16)
AST SERPL-CCNC: 12 U/L (ref 10–40)
BASOPHILS # BLD AUTO: 0.05 K/UL (ref 0–0.2)
BASOPHILS NFR BLD: 0.5 % (ref 0–1.9)
BILIRUB SERPL-MCNC: 0.3 MG/DL (ref 0.1–1)
BUN SERPL-MCNC: 15 MG/DL (ref 8–23)
CALCIUM SERPL-MCNC: 9.6 MG/DL (ref 8.7–10.5)
CHLORIDE SERPL-SCNC: 88 MMOL/L (ref 95–110)
CO2 SERPL-SCNC: 36 MMOL/L (ref 23–29)
CREAT SERPL-MCNC: 1.1 MG/DL (ref 0.5–1.4)
DIFFERENTIAL METHOD: ABNORMAL
EOSINOPHIL # BLD AUTO: 0.5 K/UL (ref 0–0.5)
EOSINOPHIL NFR BLD: 5.5 % (ref 0–8)
ERYTHROCYTE [DISTWIDTH] IN BLOOD BY AUTOMATED COUNT: 14.7 % (ref 11.5–14.5)
EST. GFR  (AFRICAN AMERICAN): 56 ML/MIN/1.73 M^2
EST. GFR  (NON AFRICAN AMERICAN): 49 ML/MIN/1.73 M^2
GLUCOSE SERPL-MCNC: 100 MG/DL (ref 70–110)
HCT VFR BLD AUTO: 25.7 % (ref 37–48.5)
HGB BLD-MCNC: 7.5 G/DL (ref 12–16)
IMM GRANULOCYTES # BLD AUTO: 0.05 K/UL (ref 0–0.04)
IMM GRANULOCYTES NFR BLD AUTO: 0.5 % (ref 0–0.5)
LYMPHOCYTES # BLD AUTO: 1.7 K/UL (ref 1–4.8)
LYMPHOCYTES NFR BLD: 17.9 % (ref 18–48)
MAGNESIUM SERPL-MCNC: 1.9 MG/DL (ref 1.6–2.6)
MCH RBC QN AUTO: 27.2 PG (ref 27–31)
MCHC RBC AUTO-ENTMCNC: 29.2 G/DL (ref 32–36)
MCV RBC AUTO: 93 FL (ref 82–98)
MONOCYTES # BLD AUTO: 0.6 K/UL (ref 0.3–1)
MONOCYTES NFR BLD: 6 % (ref 4–15)
NEUTROPHILS # BLD AUTO: 6.6 K/UL (ref 1.8–7.7)
NEUTROPHILS NFR BLD: 69.6 % (ref 38–73)
NRBC BLD-RTO: 0 /100 WBC
PHOSPHATE SERPL-MCNC: 5.6 MG/DL (ref 2.7–4.5)
PLATELET # BLD AUTO: 408 K/UL (ref 150–450)
PMV BLD AUTO: 9.1 FL (ref 9.2–12.9)
POTASSIUM SERPL-SCNC: 3.9 MMOL/L (ref 3.5–5.1)
PROT SERPL-MCNC: 5.7 G/DL (ref 6–8.4)
RBC # BLD AUTO: 2.76 M/UL (ref 4–5.4)
SODIUM SERPL-SCNC: 136 MMOL/L (ref 136–145)
WBC # BLD AUTO: 9.43 K/UL (ref 3.9–12.7)

## 2022-01-05 PROCEDURE — C9113 INJ PANTOPRAZOLE SODIUM, VIA: HCPCS | Performed by: STUDENT IN AN ORGANIZED HEALTH CARE EDUCATION/TRAINING PROGRAM

## 2022-01-05 PROCEDURE — 94761 N-INVAS EAR/PLS OXIMETRY MLT: CPT

## 2022-01-05 PROCEDURE — 25000242 PHARM REV CODE 250 ALT 637 W/ HCPCS: Performed by: STUDENT IN AN ORGANIZED HEALTH CARE EDUCATION/TRAINING PROGRAM

## 2022-01-05 PROCEDURE — 25000003 PHARM REV CODE 250: Performed by: HOSPITALIST

## 2022-01-05 PROCEDURE — 27000221 HC OXYGEN, UP TO 24 HOURS

## 2022-01-05 PROCEDURE — 94640 AIRWAY INHALATION TREATMENT: CPT

## 2022-01-05 PROCEDURE — 80053 COMPREHEN METABOLIC PANEL: CPT | Performed by: STUDENT IN AN ORGANIZED HEALTH CARE EDUCATION/TRAINING PROGRAM

## 2022-01-05 PROCEDURE — 25000003 PHARM REV CODE 250: Performed by: INTERNAL MEDICINE

## 2022-01-05 PROCEDURE — 83735 ASSAY OF MAGNESIUM: CPT | Performed by: STUDENT IN AN ORGANIZED HEALTH CARE EDUCATION/TRAINING PROGRAM

## 2022-01-05 PROCEDURE — 63600175 PHARM REV CODE 636 W HCPCS: Performed by: STUDENT IN AN ORGANIZED HEALTH CARE EDUCATION/TRAINING PROGRAM

## 2022-01-05 PROCEDURE — 25000003 PHARM REV CODE 250: Performed by: STUDENT IN AN ORGANIZED HEALTH CARE EDUCATION/TRAINING PROGRAM

## 2022-01-05 PROCEDURE — 85025 COMPLETE CBC W/AUTO DIFF WBC: CPT | Performed by: STUDENT IN AN ORGANIZED HEALTH CARE EDUCATION/TRAINING PROGRAM

## 2022-01-05 PROCEDURE — 84100 ASSAY OF PHOSPHORUS: CPT | Performed by: STUDENT IN AN ORGANIZED HEALTH CARE EDUCATION/TRAINING PROGRAM

## 2022-01-05 PROCEDURE — 36415 COLL VENOUS BLD VENIPUNCTURE: CPT | Performed by: STUDENT IN AN ORGANIZED HEALTH CARE EDUCATION/TRAINING PROGRAM

## 2022-01-05 RX ORDER — ACETAMINOPHEN 500 MG
1000 TABLET ORAL EVERY 8 HOURS PRN
Refills: 0
Start: 2022-01-05

## 2022-01-05 RX ORDER — HYDROCODONE BITARTRATE AND ACETAMINOPHEN 10; 325 MG/1; MG/1
2 TABLET ORAL EVERY 6 HOURS PRN
Refills: 0
Start: 2022-01-05

## 2022-01-05 RX ORDER — DIGOXIN 125 MCG
0.12 TABLET ORAL DAILY
Qty: 30 TABLET | Refills: 2 | Status: SHIPPED | OUTPATIENT
Start: 2022-01-06 | End: 2023-01-06

## 2022-01-05 RX ORDER — FLUTICASONE FUROATE AND VILANTEROL 100; 25 UG/1; UG/1
1 POWDER RESPIRATORY (INHALATION) DAILY
Qty: 60 EACH | Refills: 0 | Status: SHIPPED | OUTPATIENT
Start: 2022-01-06

## 2022-01-05 RX ORDER — LIDOCAINE 50 MG/G
1 PATCH TOPICAL DAILY
Refills: 0
Start: 2022-01-05

## 2022-01-05 RX ORDER — FUROSEMIDE 20 MG/1
40 TABLET ORAL DAILY
Qty: 60 TABLET | Refills: 0
Start: 2022-01-05 | End: 2022-03-06

## 2022-01-05 RX ADMIN — PANTOPRAZOLE SODIUM 40 MG: 40 INJECTION, POWDER, LYOPHILIZED, FOR SOLUTION INTRAVENOUS at 09:01

## 2022-01-05 RX ADMIN — OXYCODONE 5 MG: 5 TABLET ORAL at 05:01

## 2022-01-05 RX ADMIN — Medication 1000 UNITS: at 09:01

## 2022-01-05 RX ADMIN — THERA TABS 1 TABLET: TAB at 09:01

## 2022-01-05 RX ADMIN — IPRATROPIUM BROMIDE AND ALBUTEROL SULFATE 3 ML: 2.5; .5 SOLUTION RESPIRATORY (INHALATION) at 12:01

## 2022-01-05 RX ADMIN — FUROSEMIDE 40 MG: 40 TABLET ORAL at 09:01

## 2022-01-05 RX ADMIN — MIDODRINE HYDROCHLORIDE 5 MG: 2.5 TABLET ORAL at 09:01

## 2022-01-05 RX ADMIN — IPRATROPIUM BROMIDE AND ALBUTEROL SULFATE 3 ML: 2.5; .5 SOLUTION RESPIRATORY (INHALATION) at 07:01

## 2022-01-05 RX ADMIN — CYANOCOBALAMIN TAB 1000 MCG 1000 MCG: 1000 TAB at 09:01

## 2022-01-05 RX ADMIN — MIDODRINE HYDROCHLORIDE 5 MG: 2.5 TABLET ORAL at 02:01

## 2022-01-05 RX ADMIN — POTASSIUM & SODIUM PHOSPHATES POWDER PACK 280-160-250 MG 1 PACKET: 280-160-250 PACK at 09:01

## 2022-01-05 RX ADMIN — DILTIAZEM HYDROCHLORIDE 90 MG: 60 TABLET, FILM COATED ORAL at 02:01

## 2022-01-05 RX ADMIN — FLUTICASONE FUROATE AND VILANTEROL TRIFENATATE 1 PUFF: 100; 25 POWDER RESPIRATORY (INHALATION) at 07:01

## 2022-01-05 RX ADMIN — ACETAMINOPHEN 1000 MG: 500 TABLET ORAL at 05:01

## 2022-01-05 RX ADMIN — DILTIAZEM HYDROCHLORIDE 90 MG: 60 TABLET, FILM COATED ORAL at 05:01

## 2022-01-05 RX ADMIN — LORAZEPAM 0.5 MG: 2 INJECTION INTRAMUSCULAR; INTRAVENOUS at 02:01

## 2022-01-05 RX ADMIN — POLYETHYLENE GLYCOL 3350 17 G: 17 POWDER, FOR SOLUTION ORAL at 09:01

## 2022-01-05 RX ADMIN — HYDROCODONE BITARTRATE AND ACETAMINOPHEN 2 TABLET: 10; 325 TABLET ORAL at 02:01

## 2022-01-05 RX ADMIN — LIDOCAINE 5% 1 PATCH: 700 PATCH TOPICAL at 02:01

## 2022-01-05 RX ADMIN — DIGOXIN 0.12 MG: 125 TABLET ORAL at 09:01

## 2022-01-05 NOTE — SUBJECTIVE & OBJECTIVE
Interval History:   Anemia is controlled.   The afib is controlled.  No SOB today. No new complaints.  She reiterated to me her preference for comfort over prolonging life at the expense of suffering.  She's interested in hospice services at the nursing home.    Review of Systems   Constitutional: Negative for chills, fatigue and fever.   Respiratory: Positive for shortness of breath. Negative for cough.    Cardiovascular: Negative for chest pain and leg swelling.   Gastrointestinal: Negative for abdominal pain, nausea and vomiting.     Objective:     Vital Signs (Most Recent):  Temp: 97.8 °F (36.6 °C) (01/04/22 1922)  Pulse: 94 (01/04/22 1922)  Resp: 17 (01/04/22 2008)  BP: (!) 122/56 (01/04/22 1922)  SpO2: 95 % (01/04/22 1833) Vital Signs (24h Range):  Temp:  [96.3 °F (35.7 °C)-97.8 °F (36.6 °C)] 97.8 °F (36.6 °C)  Pulse:  [] 94  Resp:  [14-20] 17  SpO2:  [93 %-99 %] 95 %  BP: (116-148)/(55-69) 122/56     Weight: 50.7 kg (111 lb 12.4 oz)  Body mass index is 19.19 kg/m².    Intake/Output Summary (Last 24 hours) at 1/4/2022 2114  Last data filed at 1/4/2022 1800  Gross per 24 hour   Intake 240 ml   Output 1400 ml   Net -1160 ml      Physical Exam  Vitals reviewed.   Constitutional:       General: She is not in acute distress.     Comments: Frail   Eyes:      General:         Right eye: No discharge.         Left eye: No discharge.   Neck:      Vascular: No JVD.   Cardiovascular:      Rate and Rhythm: Tachycardia present. Rhythm regularly irregular.   Pulmonary:      Effort: Pulmonary effort is normal.      Breath sounds: Normal breath sounds. Decreased air movement present.   Abdominal:      General: Bowel sounds are normal. There is no distension.      Palpations: Abdomen is soft.      Tenderness: There is no abdominal tenderness.   Skin:     General: Skin is warm.      Coloration: Skin is pale.      Findings: No rash.   Neurological:      Mental Status: She is alert.         Significant Labs: All pertinent  labs within the past 24 hours have been reviewed.    Significant Imaging: I have reviewed all pertinent imaging results/findings within the past 24 hours.

## 2022-01-05 NOTE — PLAN OF CARE
I sent the pts dc orders and NH orders to [facility] and [facility] to review for the pt to return with hospice services.  I spoke to Matt at [phone] and updated him also.  I also updated Heidy with [facility] at [phone]. I returned Colt's call with [facility] [phone] and let her know that the pt signed with another hospice company.  CM following.    01/05/22 1335   Post-Acute Status   Post-Acute Authorization Placement   Post-Acute Placement Status Referrals Sent

## 2022-01-05 NOTE — PLAN OF CARE
I updated the pts nurse that report can be called to [facility] to [facility] and the pt is going to Room #1 . The phone number is [phone]. Discharge destination booked in [facility]. Matthew will transport after report is called.    I left a message for Matt with [facility] [phone] that the pt is returning today. I spoke to Matt and faxed him discharge orders, NH orders and Discharge summary to [phone].      01/05/22 1501   Post-Acute Status   Post-Acute Authorization Placement   Post-Acute Placement Status Set-up Complete/Auth obtained

## 2022-01-05 NOTE — PLAN OF CARE
The pt is cleared from case management to return to [facility] with hospice [facility].    01/05/22 1505   Final Note   Assessment Type Final Discharge Note   Anticipated Discharge Disposition skilled nursing [facility]

## 2022-01-05 NOTE — CHAPLAIN
"Visited pt again as I see she will be d/c back to NH officially on hospice; it looked like at some point yesterday pt was very emotional about this, but she seemed resolved today--"If the good Lord wants to take me, then my time is up!" She said the NH is giving her a "special room-- bigger and more private and the hospice person will visit me there."     Pt welcomed me to say a prayer over/with/for her and she was appreciative. Lord, in your mercy.  "

## 2022-01-05 NOTE — ASSESSMENT & PLAN NOTE
Diagnosed during current admission via endoscopic exam.  Biopsies obtained.  Monitor for bleeding.  Patient chooses not to have treatment for it, neither surgical nor medical.  I consulted palliative care service for recommendations.    The hospital  has visited with her and will provide forms for ACP.  Dr. Lentz with palliative services will see her tomorrow.   Patient is interested in hospice care at [facility].

## 2022-01-05 NOTE — ASSESSMENT & PLAN NOTE
Advance Care Planning     [facility]  I engaged the patient in a conversation about advance care planning and we specifically addressed what the goals of care would be moving forward, in light of the patient's change in clinical status, specifically the new diagnosis of colon cancer.  We did specifically address the patient's likely prognosis, which is poor.  We explored the patient's values and preferences for future care.  The patient endorses that what is most important right now is to focus on symptom/pain control and quality of life, even if it means sacrificing a little time    Accordingly, we have decided that the best plan to meet the patient's goals includes enrolling in hospice care    I did explain the role for hospice care at this stage of the patient's illness, including its ability to help the patient live with the best quality of life possible.  We will be making a hospice referral.    I spent a total of 24 minutes engaging the patient in this advance care planning discussion.

## 2022-01-05 NOTE — PLAN OF CARE
Message received from Colt Grace with [facility] stated that her mom became upset about the hospice talk yesterday and had an anxiety attack so she met with the pt at her home around 4 pm. She stated that she went over the hospice information with her and Ms. Hsu will be at the hospital today and [facility] is available and will be glad to service the needs of the pt at [facility]. CM following.    01/05/22 0834   Post-Acute Status   Post-Acute Authorization Hospice   Hospice Status Referrals Sent

## 2022-01-05 NOTE — ASSESSMENT & PLAN NOTE
Probably anemia of chronic blood loss.  Will monitor HGB.  Stable at the moment.    Lab Results   Component Value Date    HGB 7.4 (L) 01/04/2022

## 2022-01-05 NOTE — PROGRESS NOTES
[facility] Hospital Medicine  Progress Note    Patient Name: Patrizia Valadez  MRN: [id number]  Patient Class: IP- Inpatient   Admission Date: 12/26/2021  Length of Stay: 9 days  Attending Physician: Reyes Olmstead MD  Primary Care Provider: Anya Farr NP        Subjective:     Principal Problem:Colon cancer        HPI:  Patrizia Valadez is a 75-year-old female with a past medical history of COPD, chronic respiratory failure on oxygen at home, lymphoma, and past surgical history of cholecystectomy who presented to the ED with a complaint of shortness of breath. HPI is limited due to acuity of condition. She was sent from a nursing some for decreased mental status as well as low oxygen saturation and respiratory distress. Per ED notes, EMS found the patient to have a pulse ox of 79% on arrival. She received a duoneb treatment, albuterol treatment and solumedrol 125 mg prior to arrival.     Upon arrival to the ED, the patient was found to be altered and confused, and in respiratory distress. She was placed on a non re breather mask, which improved her oxygenation to 98% but she did require non invasive ventilation. She was placed on BiPAP at 50%, 14/6, which has improved her respiratory status. She is more alert and awake, and is able to answer simple questions appropriately. Chest xray revealed bilateral pneumonia along with chronic findings. CBC revealed leukocytosis of 12.87 and what appears to be chronic anemia. Initial lactic acid level was elevated at 2.4, but has decreased to 1.5. Troponin negative, urine negative. BNP elevated at 1540. She was given one dose of Lasix 40 mg IV. Due to continued use of BiPAP and high acuity, she will be admitted to the ICU. The patient does state she is a DNR, review of medical record does show past recent admissions as a DNR. She does not have paper work with her today. Hospital Medicine consulted for admission and further  management.          Overview/Hospital Course:  Patrizia Valadez is a 75 year old female with a past medical history of COPD, chronic respiratory failure on oxygen at home, lymphoma, HANNAH, Afib, and CHF who presented to the ED with a complaint of shortness of breath. She was found to have an acute on chronic CHF exacerbation and is being treated with IV Lasix. She has required BiPAP therapy and was admitted to the ICU. She was placed empiric Zosyn as well for a possible superimposed pneumonia (WBC and procalcitonin elevated.) Pulmonary and Cardiology have also been consulted. Her course was complicated by Afib for which the patient is on a diltiazem infusion. Anticoagulation with Eliquis was held given a witnessed bloody bowel movement. GI has been consulted and will perform colonoscopy 12/29. She was diuresed roughly 2.2 L and she was able to be weaned off BiPAP to 2 L NC. Her course was complicated by JOSE likely secondary to over-diuresis. Lasix has held and a 500 cc LR bolus was ordered. PT/OT was consulted and the patient was transferred out of the ICU 12/29.      Interval History:   Anemia is controlled.   The afib is controlled.  No SOB today. No new complaints.  She reiterated to me her preference for comfort over prolonging life at the expense of suffering.  She's interested in hospice services at the nursing home.    Review of Systems   Constitutional: Negative for chills, fatigue and fever.   Respiratory: Positive for shortness of breath. Negative for cough.    Cardiovascular: Negative for chest pain and leg swelling.   Gastrointestinal: Negative for abdominal pain, nausea and vomiting.     Objective:     Vital Signs (Most Recent):  Temp: 97.8 °F (36.6 °C) (01/04/22 1922)  Pulse: 94 (01/04/22 1922)  Resp: 17 (01/04/22 2008)  BP: (!) 122/56 (01/04/22 1922)  SpO2: 95 % (01/04/22 1833) Vital Signs (24h Range):  Temp:  [96.3 °F (35.7 °C)-97.8 °F (36.6 °C)] 97.8 °F (36.6 °C)  Pulse:  [] 94  Resp:  [14-20]  17  SpO2:  [93 %-99 %] 95 %  BP: (116-148)/(55-69) 122/56     Weight: 50.7 kg (111 lb 12.4 oz)  Body mass index is 19.19 kg/m².    Intake/Output Summary (Last 24 hours) at 1/4/2022 2114  Last data filed at 1/4/2022 1800  Gross per 24 hour   Intake 240 ml   Output 1400 ml   Net -1160 ml      Physical Exam  Vitals reviewed.   Constitutional:       General: She is not in acute distress.     Comments: Frail   Eyes:      General:         Right eye: No discharge.         Left eye: No discharge.   Neck:      Vascular: No JVD.   Cardiovascular:      Rate and Rhythm: Tachycardia present. Rhythm regularly irregular.   Pulmonary:      Effort: Pulmonary effort is normal.      Breath sounds: Normal breath sounds. Decreased air movement present.   Abdominal:      General: Bowel sounds are normal. There is no distension.      Palpations: Abdomen is soft.      Tenderness: There is no abdominal tenderness.   Skin:     General: Skin is warm.      Coloration: Skin is pale.      Findings: No rash.   Neurological:      Mental Status: She is alert.         Significant Labs: All pertinent labs within the past 24 hours have been reviewed.    Significant Imaging: I have reviewed all pertinent imaging results/findings within the past 24 hours.      Assessment/Plan:      * Colon cancer  Diagnosed during current admission via endoscopic exam.  Biopsies obtained.  Monitor for bleeding.  Patient chooses not to have treatment for it, neither surgical nor medical.  I consulted palliative care service for recommendations.    [facility]  has visited with her and will provide forms for ACP.  Dr. Lentz with palliative services will see her tomorrow.   Patient is interested in hospice care at [facility].    Goals of care, counseling/discussion  Advance Care Planning     [facility]  I engaged the patient in a conversation about advance care planning and we specifically addressed what the goals of care would be moving forward, in light of the  patient's change in clinical status, specifically the new diagnosis of colon cancer.  We did specifically address the patient's likely prognosis, which is poor.  We explored the patient's values and preferences for future care.  The patient endorses that what is most important right now is to focus on symptom/pain control and quality of life, even if it means sacrificing a little time    Accordingly, we have decided that the best plan to meet the patient's goals includes enrolling in hospice care    I did explain the role for hospice care at this stage of the patient's illness, including its ability to help the patient live with the best quality of life possible.  We will be making a hospice referral.    I spent a total of 24 minutes engaging the patient in this advance care planning discussion.           JOSE (acute kidney injury)  JOSE has resolved.    Lab Results   Component Value Date    CREATININE 1.0 01/04/2022         Chronic heart failure with preserved ejection fraction  Her CHF is stable at this time.  Monitor volume status and for symptoms of pulmonary edema.    Chronic hypotension on midodrine  Controlled.  Monitor BP.  Continue midodrine.    Anemia due to chronic blood loss  Probably anemia of chronic blood loss.  Will monitor HGB.  Stable at the moment.    Lab Results   Component Value Date    HGB 7.4 (L) 01/04/2022         Paroxysmal atrial fibrillation  Better controlled.  Off diltiazem infusion.  On oral formulation.  Monitor heart rate.  Keep K within good range.  Follow cardiology's recommendations.    Multifocal atrial tachycardia        Acute on chronic respiratory failure with hypoxia and hypercapnia  Treat underlying causes, which is CHF exacerbation.  Supplement oxygen.  Monitor oximetry readings and ABG's as needed.  Follow pulmonology's recommendations.    COPD (chronic obstructive pulmonary disease)  Stable.  Continue bronchodilator treatments.  Follow pulmonology's recommendations.      VTE  Risk Mitigation (From admission, onward)         Ordered     Place sequential compression device  Until discontinued         12/27/21 0302                Discharge Planning   VAZQUEZ: 1/5/2022     Code Status: DNR   Is the patient medically ready for discharge?:     Reason for patient still in hospital (select all that apply): Patient trending condition, Consult recommendations and Pending disposition  Discharge Plan A: Return to nursing home                  Reyes Olmstead MD  Department of Hospital Medicine   [facility]

## 2022-01-05 NOTE — DISCHARGE SUMMARY
[facility] Hospital Medicine  Discharge Summary      Patient Name: Patrizia Valadez  MRN: [id number]  Admission Date: 12/26/2021  Hospital Length of Stay: 10 days  Discharge Date and Time:  01/05/2022 1:27 PM  Attending Physician: Brian Del Rosario MD   Discharging Provider: Brian Del Rosario MD  Primary Care Provider: Anya Farr NP        HPI:     Patrizia Valadez is a 75-year-old female with a past medical history of COPD, chronic respiratory failure on oxygen at home, lymphoma, and past surgical history of cholecystectomy who presented to the ED with a complaint of shortness of breath. HPI is limited due to acuity of condition. She was sent from a nursing some for decreased mental status as well as low oxygen saturation and respiratory distress. Per ED notes, EMS found the patient to have a pulse ox of 79% on arrival. She received a duoneb treatment, albuterol treatment and solumedrol 125 mg prior to arrival.      Upon arrival to the ED, the patient was found to be altered and confused, and in respiratory distress. She was placed on a non re breather mask, which improved her oxygenation to 98% but she did require non invasive ventilation. She was placed on BiPAP at 50%, 14/6, which has improved her respiratory status. She is more alert and awake, and is able to answer simple questions appropriately. Chest xray revealed bilateral pneumonia along with chronic findings. CBC revealed leukocytosis of 12.87 and what appears to be chronic anemia. Initial lactic acid level was elevated at 2.4, but has decreased to 1.5. Troponin negative, urine negative. BNP elevated at 1540. She was given one dose of Lasix 40 mg IV. Due to continued use of BiPAP and high acuity, she will be admitted to the ICU. The patient does state she is a DNR, review of medical record does show past recent admissions as a DNR. She does not have paper work with her today. Hospital Medicine consulted for admission and further  management.            Procedure(s) (LRB):  COLONOSCOPY (N/A)      Hospital Course:     75 year old female with a past medical history of COPD, chronic respiratory failure on oxygen at home, lymphoma, HANNAH, Afib, and CHF who presented to the ED with a complaint of shortness of breath. She was found to have an acute on chronic CHF exacerbation and is being treated with IV Lasix. She has required BiPAP therapy and was admitted to the ICU. She was placed empiric Zosyn as well for a possible superimposed pneumonia (WBC and procalcitonin elevated.) Pulmonary and Cardiology have also been consulted. Her course was complicated by Afib for which the patient is on a diltiazem infusion. Anticoagulation with Eliquis was held given a witnessed bloody bowel movement.     a colonoscopy was performed and masses were identified which appear concerning for malignancy.  Pathology remains pending.      However given severity of her combined disease is in her desire not to pursue further treatment for the colonic masses.  The patient and daughter have opted for hospice care.  She will be discharged to her nursing facility with hospice.  Consults:   Consults (From admission, onward)        Status Ordering Provider     Inpatient consult to Social Work/Case Management  Once        Provider:  (Not yet assigned)    Acknowledged KIM KATHLEEN     Inpatient consult to Midline team  Once        Provider:  (Not yet assigned)    Acknowledged KIM KATHLEEN     Inpatient consult to Palliative Care  Once        Provider:  Levon Lentz MD    Acknowledged KIM KATHLEEN     Inpatient consult to General Surgery  Once        Provider:  Frederick Larose MD    Completed GHASSAN YOUNG     Inpatient consult to Gastroenterology  Once        Provider:  Ghassan Young MD    Completed JENNIFER AGUILAR     Inpatient consult to Cardiology  Once        Provider:  Zeke Pineda MD    Acknowledged JENNIFER AGUILAR     Inpatient consult to  Spiritual Care  Once        Provider:  (Not yet assigned)    Completed NOLA ORTIZ     IP consult to case management  Once        Provider:  (Not yet assigned)    Completed NOLA ORTIZ     Pulmonology  Once        Provider:  Miranda Cash MD    Completed STACY VALDIVIA          Final Active Diagnoses:    Diagnosis Date Noted POA    PRINCIPAL PROBLEM:  Colon cancer [C18.9]  Yes    Goals of care, counseling/discussion [Z71.89] 01/04/2022 Not Applicable    Severe malnutrition [E43] 12/31/2021 Yes    JOSE (acute kidney injury) [N17.9] 12/29/2021 No    Anemia due to chronic blood loss [D50.0] 10/22/2021 Yes    Chronic hypotension on midodrine [I95.9] 10/22/2021 Yes     Chronic    Chronic heart failure with preserved ejection fraction [I50.42] 10/22/2021 Yes     Chronic    Paroxysmal atrial fibrillation [I48.0] 10/12/2021 Yes    Acute on chronic respiratory failure with hypoxia and hypercapnia [J96.21, J96.22] 08/03/2021 Yes    COPD (chronic obstructive pulmonary disease) [J44.9] 08/03/2021 Yes     Chronic      Problems Resolved During this Admission:    Diagnosis Date Noted Date Resolved POA    Hyperkalemia [E87.5] 12/31/2021 01/02/2022 No    Hypokalemia [E87.6] 12/30/2021 12/31/2021 Yes    Acute on chronic diastolic congestive heart failure [I50.33] 10/14/2021 01/03/2022 Yes      Discharged Condition: stable    Disposition: Hospice/Medical Facility    Follow Up:   Follow-up Information     [facility].    Specialty: Hospice Services  Contact information:  [address] [place] [address]  [facility] [address]  [phone]                       Patient Instructions:   No discharge procedures on file.  Medications:  Reconciled Home Medications:      Medication List      START taking these medications    digoxin 125 mcg tablet  Commonly known as: LANOXIN  Take 1 tablet (0.125 mg total) by mouth once daily.  Start taking on: January 6, 2022     fluticasone furoate-vilanteroL 100-25 mcg/dose diskus  inhaler  Commonly known as: BREO  Inhale 1 puff into the lungs once daily. Controller  Start taking on: January 6, 2022     HYDROcodone-acetaminophen  mg per tablet  Commonly known as: NORCO  Take 2 tablets by mouth every 6 (six) hours as needed (Moderate pain).     LIDOcaine 5 %  Commonly known as: LIDODERM  Place 1 patch onto the skin once daily. Remove & Discard patch within 12 hours or as directed by MD        CHANGE how you take these medications    acetaminophen 500 MG tablet  Commonly known as: TYLENOL  Take 2 tablets (1,000 mg total) by mouth every 8 (eight) hours as needed for Pain (Mild pain).  What changed:   · medication strength  · how much to take  · when to take this  · reasons to take this     furosemide 20 MG tablet  Commonly known as: LASIX  Take 2 tablets (40 mg total) by mouth once daily.  What changed: how much to take        CONTINUE taking these medications    albuterol 90 mcg/actuation inhaler  Commonly known as: VENTOLIN HFA  Inhale 2 puffs into the lungs every 6 (six) hours as needed for Wheezing. Rescue     ALPRAZolam 0.5 MG tablet  Commonly known as: XANAX  Take 1 tablet (0.5 mg total) by mouth 2 (two) times daily as needed for Anxiety.     apixaban 2.5 mg Tab  Commonly known as: ELIQUIS  Take 1 tablet (2.5 mg total) by mouth 2 (two) times daily.     CYANOCOBALAMIN (VITAMIN B-12) ORAL  Take 1 tablet by mouth once daily.     diltiaZEM 180 MG 24 hr capsule  Commonly known as: CARDIZEM CD  Take 1 capsule (180 mg total) by mouth once daily.     fluticasone-umeclidin-vilanter 100-62.5-25 mcg Dsdv  Commonly known as: TRELEGY ELLIPTA  Inhale 1 puff into the lungs once daily.     midodrine 5 MG Tab  Commonly known as: PROAMATINE  Take 5 mg by mouth 3 (three) times daily.     mirtazapine 15 MG tablet  Commonly known as: REMERON  Take 1 tablet (15 mg total) by mouth every evening.     ondansetron 4 MG tablet  Commonly known as: ZOFRAN  Take 4 mg by mouth every 6 (six) hours as needed for  Nausea.     pantoprazole 40 MG tablet  Commonly known as: PROTONIX  Take 1 tablet (40 mg total) by mouth once daily.     polyethylene glycol 17 gram/dose powder  Commonly known as: GLYCOLAX  Take 17 g by mouth once daily.     vitamin D 1000 units Tab  Commonly known as: VITAMIN D3  Take 1,000 Units by mouth once daily.        STOP taking these medications    alendronate 70 MG tablet  Commonly known as: FOSAMAX     ferrous sulfate 325 mg (65 mg iron) Tab tablet  Commonly known as: IRON     metoprolol tartrate 25 MG tablet  Commonly known as: LOPRESSOR     multivitamin with minerals tablet     omega 3-dha-epa-fish oil 1,000 mg (120 mg-180 mg) Cap     potassium chloride 10 MEQ Tbsr  Commonly known as: KLOR-CON              Pending Diagnostic Studies:     Procedure Component Value Units Date/Time    CEA [[id number]] Collected: 12/30/21 0630    Order Status: Sent Lab Status: In process Updated: 12/30/21 1013    Specimen: Blood     Cytology, Fluid/Wash/Brush [[id number]] Collected: 12/30/21 1421    Order Status: Sent Lab Status: In process Updated: 12/30/21 1448    Specimen to Pathology, Surgery Gastrointestinal tract [[id number]] Collected: 12/29/21 1622    Order Status: Sent Lab Status: In process Updated: 12/30/21 0033        Indwelling Lines/Drains at time of discharge:   Lines/Drains/Airways     Drain            Female External Urinary Catheter 01/02/22 3 days                Time spent on the discharge of patient: 24 minutes         Brian Del Rosario MD  Department of Hospital Medicine  [facility]

## 2022-01-05 NOTE — PLAN OF CARE
I received  a message from Matt with [facility] stating that the pt signed hospice consents with him to return to the nursing home and he needs clinicals and orders sent to him. FRANTZ Francois CM updated via secure chat. CM following. Information sent via [employer].    01/05/22 9424   Post-Acute Status   Post-Acute Authorization Hospice   Hospice Status Pending post acute provider review/more information requested

## 2022-01-05 NOTE — CARE UPDATE
01/05/22 0705   Patient Assessment/Suction   Level of Consciousness (AVPU) alert   Respiratory Effort Normal   Expansion/Accessory Muscles/Retractions expansion symmetric   All Lung Fields Breath Sounds Anterior:   LLL Breath Sounds diminished   RLL Breath Sounds diminished   Rhythm/Pattern, Respiratory pattern regular   Cough Frequency no cough   PRE-TX-O2   O2 Device (Oxygen Therapy) nasal cannula   $ Is the patient on Low Flow Oxygen? Yes   Flow (L/min) 2   Oxygen Concentration (%) 28   SpO2 (!) 93 %   Pulse Oximetry Type Intermittent   $ Pulse Oximetry - Multiple Charge Pulse Oximetry - Multiple   Pulse 104   Resp 18   Positioning HOB elevated 30 degrees   Aerosol Therapy   $ Aerosol Therapy Charges Aerosol Treatment   Respiratory Treatment Status (SVN) given   Treatment Route (SVN) mask   Patient Position (SVN) HOB elevated   Signs of Intolerance (SVN) none   POx, nebs Q6, DPI QD

## 2022-01-05 NOTE — NURSING
Report called to FRANTZ Evans at [facility], pt has been made aware that her discharge has been confirmed, pt is pleased to be able to discharge today. Lying in bed, resting quietly, no distress noted, pt called family and updated them that she will be leaving today.

## 2022-01-05 NOTE — PLAN OF CARE
[facility]     Department of Hospital Medicine     [address] [place] [address]     [phone] after hours  [phone] fax       Hospice/NURSING HOME ORDERS    01/05/2022    Admit to Hospice/Nursing Home:  Regular Bed       Diagnoses:  Active Hospital Problems    Diagnosis  POA    *Colon cancer [C18.9]  Yes    Goals of care, counseling/discussion [Z71.89]  Not Applicable    Severe malnutrition [E43]  Yes    JOSE (acute kidney injury) [N17.9]  No    Anemia due to chronic blood loss [D50.0]  Yes    Chronic hypotension on midodrine [I95.9]  Yes     Chronic    Chronic heart failure with preserved ejection fraction [I50.42]  Yes     Chronic    Paroxysmal atrial fibrillation [I48.0]  Yes    Acute on chronic respiratory failure with hypoxia and hypercapnia [J96.21, J96.22]  Yes    COPD (chronic obstructive pulmonary disease) [J44.9]  Yes     Chronic      Resolved Hospital Problems    Diagnosis Date Resolved POA    Hyperkalemia [E87.5] 01/02/2022 No    Hypokalemia [E87.6] 12/31/2021 Yes    Acute on chronic diastolic congestive heart failure [I50.33] 01/03/2022 Yes       Patient is homebound due to:  Colon cancer    Allergies:  Review of patient's allergies indicates:   Allergen Reactions    Xanax [alprazolam] Hallucinations    Hydromorphone      Nausea^  Nausea^         Vitals:      Routine, once monthly      Diet: Regular    Acitivities:     - As tolerated      Nursing Precautions:     - Aspiration precautions:             - Total assistance with meals             - Fall precautions per nursing home protocol    - Decubitus precautions:        -  for positioning   - Pressure reducing foam mattress   - Turn patient every two hours. Use wedge pillows to anchor patient    CONSULTS:    Hospice     MISCELLANEOUS CARE:      Routine Skin for Bedridden Patients:  Apply moisture barrier cream to all    skin folds and wet areas in perineal area daily and  after baths and                           all bowel movements.              Medications: Discontinue all previous medication orders, if any. See new list below.       Medication List      START taking these medications    digoxin 125 mcg tablet  Commonly known as: LANOXIN  Take 1 tablet (0.125 mg total) by mouth once daily.  Start taking on: January 6, 2022     fluticasone furoate-vilanteroL 100-25 mcg/dose diskus inhaler  Commonly known as: BREO  Inhale 1 puff into the lungs once daily. Controller  Start taking on: January 6, 2022     HYDROcodone-acetaminophen  mg per tablet  Commonly known as: NORCO  Take 2 tablets by mouth every 6 (six) hours as needed (Moderate pain).     LIDOcaine 5 %  Commonly known as: LIDODERM  Place 1 patch onto the skin once daily. Remove & Discard patch within 12 hours or as directed by MD        CHANGE how you take these medications    acetaminophen 500 MG tablet  Commonly known as: TYLENOL  Take 2 tablets (1,000 mg total) by mouth every 8 (eight) hours as needed for Pain (Mild pain).  What changed:   · medication strength  · how much to take  · when to take this  · reasons to take this     furosemide 20 MG tablet  Commonly known as: LASIX  Take 2 tablets (40 mg total) by mouth once daily.  What changed: how much to take        CONTINUE taking these medications    albuterol 90 mcg/actuation inhaler  Commonly known as: VENTOLIN HFA  Inhale 2 puffs into the lungs every 6 (six) hours as needed for Wheezing. Rescue     ALPRAZolam 0.5 MG tablet  Commonly known as: XANAX  Take 1 tablet (0.5 mg total) by mouth 2 (two) times daily as needed for Anxiety.     apixaban 2.5 mg Tab  Commonly known as: ELIQUIS  Take 1 tablet (2.5 mg total) by mouth 2 (two) times daily.     CYANOCOBALAMIN (VITAMIN B-12) ORAL  Take 1 tablet by mouth once daily.     diltiaZEM 180 MG 24 hr capsule  Commonly known as: CARDIZEM CD  Take 1 capsule (180 mg total) by mouth once daily.     fluticasone-umeclidin-vilanter  100-62.5-25 mcg Dsdv  Commonly known as: TRELEGY ELLIPTA  Inhale 1 puff into the lungs once daily.     midodrine 5 MG Tab  Commonly known as: PROAMATINE  Take 5 mg by mouth 3 (three) times daily.     mirtazapine 15 MG tablet  Commonly known as: REMERON  Take 1 tablet (15 mg total) by mouth every evening.     ondansetron 4 MG tablet  Commonly known as: ZOFRAN  Take 4 mg by mouth every 6 (six) hours as needed for Nausea.     pantoprazole 40 MG tablet  Commonly known as: PROTONIX  Take 1 tablet (40 mg total) by mouth once daily.     polyethylene glycol 17 gram/dose powder  Commonly known as: GLYCOLAX  Take 17 g by mouth once daily.     vitamin D 1000 units Tab  Commonly known as: VITAMIN D3  Take 1,000 Units by mouth once daily.        STOP taking these medications    alendronate 70 MG tablet  Commonly known as: FOSAMAX     ferrous sulfate 325 mg (65 mg iron) Tab tablet  Commonly known as: IRON     metoprolol tartrate 25 MG tablet  Commonly known as: LOPRESSOR     multivitamin with minerals tablet     omega 3-dha-epa-fish oil 1,000 mg (120 mg-180 mg) Cap     potassium chloride 10 MEQ Tbsr  Commonly known as: KLOR-CON                  _________H. Maxwell Del Rosario MD________________________  Brian Del Rosario MD  01/05/2022

## 2022-01-05 NOTE — PLAN OF CARE
I sent a message to [facility] through [facility] asking again for an update on the informational visit from yesterday. CM following.    01/05/22 5288   Post-Acute Status   Post-Acute Authorization Hospice   Hospice Status Referrals Sent

## 2022-01-06 ENCOUNTER — TELEPHONE (OUTPATIENT)
Dept: MEDSURG UNIT | Facility: HOSPITAL | Age: 77
End: 2022-01-06
Payer: MEDICARE

## 2022-01-06 LAB — FINAL PATHOLOGIC DIAGNOSIS: NORMAL

## 2022-01-06 NOTE — TELEPHONE ENCOUNTER
"Per Jesus "pt need to be scheduled. "  Spoke to pt daughter. Pt is not on hospice care and she is no longer needing the forms filled out.   "

## 2022-01-07 LAB
COMMENT: NORMAL
FINAL PATHOLOGIC DIAGNOSIS: NORMAL
GROSS: NORMAL
Lab: NORMAL

## 2022-01-07 NOTE — PROGRESS NOTES
Please advise patient's daughter that, as we previously discussed at length, biopsies did show colon cancer.  It appears from recent documentation that she is now on Hospice.  Please let us know if there is anything further we can do.

## 2022-01-18 ENCOUNTER — LAB VISIT (OUTPATIENT)
Dept: LAB | Facility: OTHER | Age: 77
End: 2022-01-18
Payer: MEDICARE

## 2022-01-18 DIAGNOSIS — Z20.822 ENCOUNTER FOR LABORATORY TESTING FOR COVID-19 VIRUS: ICD-10-CM

## 2022-01-18 PROCEDURE — U0003 INFECTIOUS AGENT DETECTION BY NUCLEIC ACID (DNA OR RNA); SEVERE ACUTE RESPIRATORY SYNDROME CORONAVIRUS 2 (SARS-COV-2) (CORONAVIRUS DISEASE [COVID-19]), AMPLIFIED PROBE TECHNIQUE, MAKING USE OF HIGH THROUGHPUT TECHNOLOGIES AS DESCRIBED BY CMS-2020-01-R: HCPCS | Performed by: NURSE PRACTITIONER

## 2022-01-19 DIAGNOSIS — U07.1 COVID-19 VIRUS DETECTED: ICD-10-CM

## 2022-01-19 LAB
SARS-COV-2 RNA RESP QL NAA+PROBE: DETECTED
SARS-COV-2- CYCLE NUMBER: 27